# Patient Record
Sex: MALE | Race: ASIAN | NOT HISPANIC OR LATINO | Employment: FULL TIME | URBAN - METROPOLITAN AREA
[De-identification: names, ages, dates, MRNs, and addresses within clinical notes are randomized per-mention and may not be internally consistent; named-entity substitution may affect disease eponyms.]

---

## 2017-01-05 ENCOUNTER — HOSPITAL ENCOUNTER (OUTPATIENT)
Dept: CARDIOLOGY | Facility: MEDICAL CENTER | Age: 42
End: 2017-01-05
Attending: INTERNAL MEDICINE | Admitting: INTERNAL MEDICINE
Payer: COMMERCIAL

## 2017-01-05 ENCOUNTER — HOSPITAL ENCOUNTER (OUTPATIENT)
Dept: CARDIOLOGY | Facility: MEDICAL CENTER | Age: 42
End: 2017-01-05
Attending: INTERNAL MEDICINE
Payer: COMMERCIAL

## 2017-01-05 DIAGNOSIS — R00.2 PALPITATIONS: ICD-10-CM

## 2017-01-05 DIAGNOSIS — R07.89 ATYPICAL CHEST PAIN: ICD-10-CM

## 2017-01-05 LAB
LV EJECT FRACT  99904: 55
LV EJECT FRACT  99904: 60
LV EJECT FRACT MOD 2C 99903: 64.37
LV EJECT FRACT MOD 4C 99902: 57.36
LV EJECT FRACT MOD BP 99901: 59.51

## 2017-01-05 PROCEDURE — 93306 TTE W/DOPPLER COMPLETE: CPT | Mod: 26,59 | Performed by: INTERNAL MEDICINE

## 2017-01-05 PROCEDURE — 93350 STRESS TTE ONLY: CPT | Mod: 26 | Performed by: INTERNAL MEDICINE

## 2017-01-05 PROCEDURE — 93306 TTE W/DOPPLER COMPLETE: CPT

## 2017-01-05 PROCEDURE — 93017 CV STRESS TEST TRACING ONLY: CPT

## 2017-01-05 PROCEDURE — 93350 STRESS TTE ONLY: CPT

## 2017-01-05 PROCEDURE — 93018 CV STRESS TEST I&R ONLY: CPT | Performed by: INTERNAL MEDICINE

## 2017-02-19 ENCOUNTER — HOSPITAL ENCOUNTER (EMERGENCY)
Facility: MEDICAL CENTER | Age: 42
End: 2017-02-19
Payer: COMMERCIAL

## 2017-02-19 VITALS
WEIGHT: 168.65 LBS | HEIGHT: 64 IN | BODY MASS INDEX: 28.79 KG/M2 | RESPIRATION RATE: 18 BRPM | OXYGEN SATURATION: 97 % | DIASTOLIC BLOOD PRESSURE: 67 MMHG | SYSTOLIC BLOOD PRESSURE: 110 MMHG | HEART RATE: 85 BPM | TEMPERATURE: 97.9 F

## 2017-02-19 PROCEDURE — 302449 STATCHG TRIAGE ONLY (STATISTIC)

## 2017-02-20 NOTE — ED NOTES
"Chief Complaint   Patient presents with   • Cough     x 1 month         /67 mmHg  Pulse 85  Temp(Src) 36.6 °C (97.9 °F)  Resp 18  Ht 1.626 m (5' 4.02\")  Wt 76.5 kg (168 lb 10.4 oz)  BMI 28.93 kg/m2  SpO2 97%    "

## 2017-03-01 ENCOUNTER — HOSPITAL ENCOUNTER (EMERGENCY)
Facility: MEDICAL CENTER | Age: 42
End: 2017-03-01
Attending: EMERGENCY MEDICINE
Payer: COMMERCIAL

## 2017-03-01 ENCOUNTER — APPOINTMENT (OUTPATIENT)
Dept: RADIOLOGY | Facility: MEDICAL CENTER | Age: 42
End: 2017-03-01
Attending: EMERGENCY MEDICINE
Payer: COMMERCIAL

## 2017-03-01 ENCOUNTER — NON-PROVIDER VISIT (OUTPATIENT)
Dept: OCCUPATIONAL MEDICINE | Facility: CLINIC | Age: 42
End: 2017-03-01
Payer: COMMERCIAL

## 2017-03-01 VITALS
DIASTOLIC BLOOD PRESSURE: 71 MMHG | RESPIRATION RATE: 17 BRPM | OXYGEN SATURATION: 98 % | WEIGHT: 172 LBS | BODY MASS INDEX: 29.37 KG/M2 | SYSTOLIC BLOOD PRESSURE: 108 MMHG | HEART RATE: 80 BPM | HEIGHT: 64 IN | TEMPERATURE: 98.1 F

## 2017-03-01 DIAGNOSIS — R00.2 PALPITATIONS: ICD-10-CM

## 2017-03-01 DIAGNOSIS — Z02.83 ENCOUNTER FOR DRUG SCREENING: ICD-10-CM

## 2017-03-01 DIAGNOSIS — Z02.1 PRE-EMPLOYMENT DRUG SCREENING: ICD-10-CM

## 2017-03-01 LAB
ANION GAP SERPL CALC-SCNC: 8 MMOL/L (ref 0–11.9)
BASOPHILS # BLD AUTO: 0.5 % (ref 0–1.8)
BASOPHILS # BLD: 0.04 K/UL (ref 0–0.12)
BUN SERPL-MCNC: 14 MG/DL (ref 8–22)
CALCIUM SERPL-MCNC: 9.5 MG/DL (ref 8.5–10.5)
CHLORIDE SERPL-SCNC: 102 MMOL/L (ref 96–112)
CO2 SERPL-SCNC: 25 MMOL/L (ref 20–33)
CREAT SERPL-MCNC: 0.97 MG/DL (ref 0.5–1.4)
EKG IMPRESSION: NORMAL
EOSINOPHIL # BLD AUTO: 0.13 K/UL (ref 0–0.51)
EOSINOPHIL NFR BLD: 1.5 % (ref 0–6.9)
ERYTHROCYTE [DISTWIDTH] IN BLOOD BY AUTOMATED COUNT: 39.8 FL (ref 35.9–50)
GFR SERPL CREATININE-BSD FRML MDRD: >60 ML/MIN/1.73 M 2
GLUCOSE SERPL-MCNC: 96 MG/DL (ref 65–99)
HCT VFR BLD AUTO: 45.5 % (ref 42–52)
HGB BLD-MCNC: 15.3 G/DL (ref 14–18)
IMM GRANULOCYTES # BLD AUTO: 0.07 K/UL (ref 0–0.11)
IMM GRANULOCYTES NFR BLD AUTO: 0.8 % (ref 0–0.9)
LYMPHOCYTES # BLD AUTO: 1.84 K/UL (ref 1–4.8)
LYMPHOCYTES NFR BLD: 21.3 % (ref 22–41)
MCH RBC QN AUTO: 31.2 PG (ref 27–33)
MCHC RBC AUTO-ENTMCNC: 33.6 G/DL (ref 33.7–35.3)
MCV RBC AUTO: 92.7 FL (ref 81.4–97.8)
MONOCYTES # BLD AUTO: 0.79 K/UL (ref 0–0.85)
MONOCYTES NFR BLD AUTO: 9.2 % (ref 0–13.4)
NEUTROPHILS # BLD AUTO: 5.76 K/UL (ref 1.82–7.42)
NEUTROPHILS NFR BLD: 66.7 % (ref 44–72)
NRBC # BLD AUTO: 0 K/UL
NRBC BLD AUTO-RTO: 0 /100 WBC
PLATELET # BLD AUTO: 226 K/UL (ref 164–446)
PMV BLD AUTO: 9.2 FL (ref 9–12.9)
POTASSIUM SERPL-SCNC: 3.6 MMOL/L (ref 3.6–5.5)
RBC # BLD AUTO: 4.91 M/UL (ref 4.7–6.1)
SODIUM SERPL-SCNC: 135 MMOL/L (ref 135–145)
TROPONIN I SERPL-MCNC: <0.01 NG/ML (ref 0–0.04)
TROPONIN I SERPL-MCNC: <0.01 NG/ML (ref 0–0.04)
WBC # BLD AUTO: 8.6 K/UL (ref 4.8–10.8)

## 2017-03-01 PROCEDURE — 82075 ASSAY OF BREATH ETHANOL: CPT | Performed by: INTERNAL MEDICINE

## 2017-03-01 PROCEDURE — 99283 EMERGENCY DEPT VISIT LOW MDM: CPT

## 2017-03-01 PROCEDURE — A9270 NON-COVERED ITEM OR SERVICE: HCPCS | Performed by: EMERGENCY MEDICINE

## 2017-03-01 PROCEDURE — 85025 COMPLETE CBC W/AUTO DIFF WBC: CPT

## 2017-03-01 PROCEDURE — 84484 ASSAY OF TROPONIN QUANT: CPT

## 2017-03-01 PROCEDURE — 99026 IN-HOSPITAL ON CALL SERVICE: CPT | Performed by: INTERNAL MEDICINE

## 2017-03-01 PROCEDURE — 71010 DX-CHEST-PORTABLE (1 VIEW): CPT

## 2017-03-01 PROCEDURE — 93005 ELECTROCARDIOGRAM TRACING: CPT

## 2017-03-01 PROCEDURE — 80048 BASIC METABOLIC PNL TOTAL CA: CPT

## 2017-03-01 PROCEDURE — 80305 DRUG TEST PRSMV DIR OPT OBS: CPT | Performed by: INTERNAL MEDICINE

## 2017-03-01 PROCEDURE — 700102 HCHG RX REV CODE 250 W/ 637 OVERRIDE(OP): Performed by: EMERGENCY MEDICINE

## 2017-03-01 RX ORDER — ASPIRIN 81 MG/1
324 TABLET, CHEWABLE ORAL ONCE
Status: COMPLETED | OUTPATIENT
Start: 2017-03-01 | End: 2017-03-01

## 2017-03-01 RX ADMIN — ASPIRIN 324 MG: 81 TABLET, CHEWABLE ORAL at 03:02

## 2017-03-01 ASSESSMENT — PAIN SCALES - GENERAL: PAINLEVEL_OUTOF10: 1

## 2017-03-01 NOTE — ED AVS SNAPSHOT
3/1/2017          Lucas Simmons  [address] [place] [address]    Dear Lucas:    [facility] wants to ensure your discharge home is safe and you or your loved ones have had all your questions answered regarding your care after you leave the hospital.    You may receive a telephone call within two days of your discharge.  This call is to make certain you understand your discharge instructions as well as ensure we provided you with the best care possible during your stay with us.     The call will only last approximately 3-5 minutes and will be done by a nurse.    Once again, we want to ensure your discharge home is safe and that you have a clear understanding of any next steps in your care.  If you have any questions or concerns, please do not hesitate to contact us, we are here for you.  Thank you for choosing [facility] for your healthcare needs.    Sincerely,    David Roman    [facility]

## 2017-03-01 NOTE — MR AVS SNAPSHOT
Lucas Kaylamaggie   3/1/2017 6:40 PM   Appointment   MRN: [id number]    Department:  [facility]   Dept Phone:  [phone]    Description:  Male : 1975   Provider:  [place] NON RENOWN MA           Allergies as of 3/1/2017     No Known Allergies      Vital Signs     Smoking Status                   Never Smoker            Basic Information     Date Of Birth Sex Race Ethnicity Preferred Language    1975 Male  Non- English      Health Maintenance        Date Due Completion Dates    IMM DTaP/Tdap/Td Vaccine (1 - Tdap) 1993 ---            Current Immunizations     Influenza Vaccine Quad Inj (Pf) 2016  2:01 PM      Below and/or attached are the medications your provider expects you to take. Review all of your home medications and newly ordered medications with your provider and/or pharmacist. Follow medication instructions as directed by your provider and/or pharmacist. Please keep your medication list with you and share with your provider. Update the information when medications are discontinued, doses are changed, or new medications (including over-the-counter products) are added; and carry medication information at all times in the event of emergency situations     Allergies:  No Known Allergies          Medications  Valid as of: 2017 -  9:11 AM    Generic Name Brand Name Tablet Size Instructions for use    Multiple Vitamin   Take  by mouth.        .                 Medicines prescribed today were sent to:     None      Medication refill instructions:       If your prescription bottle indicates you have medication refills left, it is not necessary to call your provider’s office. Please contact your pharmacy and they will refill your medication.    If your prescription bottle indicates you do not have any refills left, you may request refills at any time through one of the following ways: The online [facility] system (except Urgent Care), by calling your provider’s  office, or by asking your pharmacy to contact your provider’s office with a refill request. Medication refills are processed only during regular business hours and may not be available until the next business day. Your provider may request additional information or to have a follow-up visit with you prior to refilling your medication.   *Please Note: Medication refills are assigned a new Rx number when refilled electronically. Your pharmacy may indicate that no refills were authorized even though a new prescription for the same medication is available at the pharmacy. Please request the medicine by name with the pharmacy before contacting your provider for a refill.           [employer] Access Code: Activation code not generated  Current [employer] Status: Active

## 2017-03-01 NOTE — LETTER
"  FORM C-4:  EMPLOYEE’S CLAIM FOR COMPENSATION/ REPORT OF INITIAL TREATMENT  EMPLOYEE’S CLAIM - PROVIDE ALL INFORMATION REQUESTED   First Name  Lucas Last Name  Iris Birthdate             Age  1975 41 y.o. Sex  male Claim Number   Home Employee Address  [address]  [place]                                     Zip  [address] Height  1.626 m (5' 4\") Weight  78.019 kg (172 lb) [facility]  [id number]   Mailing Employee Address                           [address]   [place]               Zip  [address] Telephone  [phone] (home)  Primary Language Spoken  ENGLISH   Insurer  *** Third Party   WORKERS CHOICE Employee's Occupation (Job Title) When Injury or Occupational Disease Occurred     Employer's Name  [employer] Telephone  [phone]    Employer Address  [address]] Zip  [address]   Date of Injury  3/1/2017       Hour of Injury  1:00 AM Date Employer Notified  3/1/2017 Last Day of Work after Injury or Occupational Disease   Supervisor to Whom Injury Reported  andrew hodge   Address or Location of Accident (if applicable)  [[facility]]   What were you doing at the time of accident? (if applicable)  working doing differential & making slides    How did this injury or occupational disease occur? Be specific and answer in detail. 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  n/a  n/a Witnesses to the Accident  suvash     Nature of Injury or Occupational Disease  Strain  Part(s) of Body Injured or Affected  Chest, N/A, N/A    I certify that the above is true and correct to the best of my knowledge and that I have provided this information in order to obtain the " benefits of [place]  I hereby authorize any physician, chiropractor, surgeon, practitioner, or other person, any hospital, including [facility] or [facility] hospital, any medical service organization, any insurance company, or other institution or organization to release to each other, any medical or other information, including benefits paid or payable, pertinent to this injury or disease, except information relative to diagnosis, treatment and/or counseling for AIDS, psychological conditions, alcohol or controlled substances, for which I must give specific authorization.  A Photostat of this authorization shall be as valid as the original.   Date Place   Employee’s Signature   THIS REPORT MUST BE COMPLETED AND MAILED WITHIN 3 WORKING DAYS OF TREATMENT   Place  [facility], EMERGENCY DEPT  Name of Facility   [facility]   Date  3/1/2017 Diagnosis  (R00.2) Palpitations Is there evidence the injured employee was under the influence of alcohol and/or another controlled substance at the time of accident?   Hour  5:52 AM Description of Injury or Disease  Palpitations     Treatment     Have you advised the patient to remain off work five days or more?             X-Ray Findings      If Yes   From Date    To Date      From information given by the employee, together with medical evidence, can you directly connect this injury or occupational disease as job incurred?    If No, is the employee capable of: Full Duty    Modified Duty      Is additional medical care by a physician indicated?    If Modified Duty, Specify any Limitations / Restrictions        Do you know of any previous injury or disease contributing to this condition or occupational disease?      Date  3/1/2017 Print Doctor’s Name  Sai Ann I certify the employer’s copy of this form was mailed on:  "  Address  [address] [id number] [phone] Insurer’s Use Only   [facility]  [id number]    Provider’s Tax ID Number    Telephone  Dept: [phone]    Doctor’s Signature    Degree       Original - TREATING PHYSICIAN OR CHIROPRACTOR   Pg 2-Insurer/TPA   Pg 3-Employer   Pg 4-Employee                                                                                                  Form C-4 (rev01/03)     BRIEF DESCRIPTION OF RIGHTS AND BENEFITS  (Pursuant to [place] 616[place])    Notice of Injury or Occupational Disease (Incident Report Form C-1): If an injury or occupational disease (OD) arises out of and in the course of employment, you must provide written notice to your employer as soon as practicable, but no later than 7 days after the accident or OD. Your employer shall maintain a sufficient supply of the required forms.    Claim for Compensation (Form C-4): If medical treatment is sought, the form C-4 is available at the place of initial treatment. A completed \"Claim for Compensation\" (Form C-4) must be filed within 90 days after an accident or OD. The treating physician or chiropractor must, within 3 working days after treatment, complete and mail to the employer, the employer's insurer and third-party , the Claim for Compensation.    Medical Treatment: If you require medical treatment for your on-the-job injury or OD, you may be required to select a physician or chiropractor from a list provided by your workers’ compensation insurer, if it has contracted with an Organization for Managed Care (MCO) or Preferred Provider Organization (PPO) or providers of health care. If your employer has not entered into a contract with an MCO or PPO, you may select a physician or chiropractor from the Panel of Physicians and Chiropractors. Any medical costs related to your industrial injury or OD will be paid by your insurer.    Temporary Total Disability (TTD): If your doctor has " certified that you are unable to work for a period of at least 5 consecutive days, or 5 cumulative days in a 20-day period, or places restrictions on you that your employer does not accommodate, you may be entitled to TTD compensation.    Temporary Partial Disability (TPD): If the wage you receive upon reemployment is less than the compensation for TTD to which you are entitled, the insurer may be required to pay you TPD compensation to make up the difference. TPD can only be paid for a maximum of 24 months.    Permanent Partial Disability (PPD): When your medical condition is stable and there is an indication of a PPD as a result of your injury or OD, within 30 days, your insurer must arrange for an evaluation by a rating physician or chiropractor to determine the degree of your PPD. The amount of your PPD award depends on the date of injury, the results of the PPD evaluation and your age and wage.    Permanent Total Disability (PTD): If you are medically certified by a treating physician or chiropractor as permanently and totally disabled and have been granted a PTD status by your insurer, you are entitled to receive monthly benefits not to exceed 66 2/3% of your average monthly wage. The amount of your PTD payments is subject to reduction if you previously received a PPD award.    Vocational Rehabilitation Services: You may be eligible for vocational rehabilitation services if you are unable to return to the job due to a permanent physical impairment or permanent restrictions as a result of your injury or occupational disease.    Transportation and Per Linda Reimbursement: You may be eligible for travel expenses and per linda associated with medical treatment.  Reopening: You may be able to reopen your claim if your condition worsens after claim closure.    Appeal Process: If you disagree with a written determination issued by the insurer or the insurer does not respond to your request, you may appeal to the  Department of Administration, , by following the instructions contained in your determination letter. You must appeal the determination within 70 days from the date of the determination letter at [address], [address], [place], [place] [address], or [address], [address], [place], [place] [address]. If you disagree with the  decision, you may appeal to the Department of Administration, . You must file your appeal within 30 days from the date of the  decision letter at [address], [address], [place], [place] [address], or [address], [address], [place], [place] [address]. If you disagree with a decision of an , you may file a petition for judicial review with the District Court. You must do so within 30 days of the Appeal Officer’s decision. You may be represented by an  at your own expense or you may contact the [facility] for possible representation.    [place]  for Injured Workers (NAIW): If you disagree with a  decision, you may request that NAIW represent you without charge at an  Hearing. For information regarding denial of benefits, you may contact the [facility] at: [address], [address], [place] [address], [phone], or [address], [address], [place] [address], [phone]    To File a Complaint with the Division: If you wish to file a complaint with the  of the Division of Industrial Relations (DIR), please contact the Workers’ Compensation Section, [address], [address], [place], [place] [address], telephone [phone], or [address], [address], [place] [address], telephone [phone].    For assistance with Workers’ Compensation Issues: you may contact the Office of the Governor Consumer Health Assistance, [address], [address], [place], [place] [address], Toll  Free 1-865.440.5839, Web site: http://yahaira..nv., E-mail vijaya@yahaira..nv.                                                                                                                                                                               __________________________________________________________________                                    _________________            Employee Name / Signature                                                                                                                            Date                                       D-2 (rev. 10/07)

## 2017-03-01 NOTE — ED NOTES
"Chief Complaint   Patient presents with   • Rapid Heart Beat     moved here from [place] and had rapid heart rate. \"feels like that again\"        "

## 2017-03-01 NOTE — LETTER
"  FORM C-4:  EMPLOYEE’S CLAIM FOR COMPENSATION/ REPORT OF INITIAL TREATMENT  EMPLOYEE’S CLAIM - PROVIDE ALL INFORMATION REQUESTED   First Name  Lucas Last Name  Iris Birthdate             Age  1975 41 y.o. 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 (if applicable)  working doing differential & making slides    How did this injury or occupational disease occur? Be specific and answer in detail. 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  n/a  n/a Witnesses to the Accident  suvash     Nature of Injury or Occupational Disease  Strain  Part(s) of Body Injured or Affected  Chest, N/A, N/A    I certify that the above is true and correct to the best of my knowledge and that I have provided this information in " order to obtain the benefits of [place]  I hereby authorize any physician, chiropractor, surgeon, practitioner, or other person, any hospital, including [facility] or [facility] hospital, any medical service organization, any insurance company, or other institution or organization to release to each other, any medical or other information, including benefits paid or payable, pertinent to this injury or disease, except information relative to diagnosis, treatment and/or counseling for AIDS, psychological conditions, alcohol or controlled substances, for which I must give specific authorization.  A Photostat of this authorization shall be as valid as the original.   Date 03/01/2016 [facility] Employee’s Signature   THIS REPORT MUST BE COMPLETED AND MAILED WITHIN 3 WORKING DAYS OF TREATMENT   Place  [facility], EMERGENCY DEPT  Name of Facility   [facility]   Date  3/1/2017 Diagnosis  (R00.2) Palpitations Is there evidence the injured employee was under the influence of alcohol and/or another controlled substance at the time of accident?   Hour  6:00 AM Description of Injury or Disease  Palpitations No   Treatment  Follow up with cardiology  Have you advised the patient to remain off work five days or more?         No   X-Ray Findings      If Yes   From Date    To Date      From information given by the employee, together with medical evidence, can you directly connect this injury or occupational disease as job incurred?  No If No, is the employee capable of: Full Duty  Yes Modified Duty      Is additional medical care by a physician indicated?  Yes If Modified Duty, Specify any Limitations / Restrictions        Do you know of any previous injury or disease contributing to this condition or occupational disease?  Yes   Date  3/1/2017 Print Doctor’s  "Name  Sai Ann certify the employer’s copy of this form was mailed on:  03/01/2017   Address  [address] [place] [address] [phone] Insurer’s Use Only   [facility]  [address]    Provider’s Tax ID Number   [id number] Telephone  Dept: [phone]    Doctor’s Signature  e-SAI Navarrete M.D. Degree   MD    Original - TREATING PHYSICIAN OR CHIROPRACTOR   Pg 2-Insurer/TPA   Pg 3-Employer   Pg 4-Employee                                                                                                  Form C-4 (rev01/03)     BRIEF DESCRIPTION OF RIGHTS AND BENEFITS  (Pursuant to [place] 616[place])    Notice of Injury or Occupational Disease (Incident Report Form C-1): If an injury or occupational disease (OD) arises out of and in the course of employment, you must provide written notice to your employer as soon as practicable, but no later than 7 days after the accident or OD. Your employer shall maintain a sufficient supply of the required forms.    Claim for Compensation (Form C-4): If medical treatment is sought, the form C-4 is available at the place of initial treatment. A completed \"Claim for Compensation\" (Form C-4) must be filed within 90 days after an accident or OD. The treating physician or chiropractor must, within 3 working days after treatment, complete and mail to the employer, the employer's insurer and third-party , the Claim for Compensation.    Medical Treatment: If you require medical treatment for your on-the-job injury or OD, you may be required to select a physician or chiropractor from a list provided by your workers’ compensation insurer, if it has contracted with an Organization for Managed Care (MCO) or Preferred Provider Organization (PPO) or providers of health care. If your employer has not entered into a contract with an MCO or PPO, you may select a physician or chiropractor from the Panel of Physicians and Chiropractors. Any medical " costs related to your industrial injury or OD will be paid by your insurer.    Temporary Total Disability (TTD): If your doctor has certified that you are unable to work for a period of at least 5 consecutive days, or 5 cumulative days in a 20-day period, or places restrictions on you that your employer does not accommodate, you may be entitled to TTD compensation.    Temporary Partial Disability (TPD): If the wage you receive upon reemployment is less than the compensation for TTD to which you are entitled, the insurer may be required to pay you TPD compensation to make up the difference. TPD can only be paid for a maximum of 24 months.    Permanent Partial Disability (PPD): When your medical condition is stable and there is an indication of a PPD as a result of your injury or OD, within 30 days, your insurer must arrange for an evaluation by a rating physician or chiropractor to determine the degree of your PPD. The amount of your PPD award depends on the date of injury, the results of the PPD evaluation and your age and wage.    Permanent Total Disability (PTD): If you are medically certified by a treating physician or chiropractor as permanently and totally disabled and have been granted a PTD status by your insurer, you are entitled to receive monthly benefits not to exceed 66 2/3% of your average monthly wage. The amount of your PTD payments is subject to reduction if you previously received a PPD award.    Vocational Rehabilitation Services: You may be eligible for vocational rehabilitation services if you are unable to return to the job due to a permanent physical impairment or permanent restrictions as a result of your injury or occupational disease.    Transportation and Per Linda Reimbursement: You may be eligible for travel expenses and per linda associated with medical treatment.  Reopening: You may be able to reopen your claim if your condition worsens after claim closure.    Appeal Process: If you  disagree with a written determination issued by the insurer or the insurer does not respond to your request, you may appeal to the Department of Administration, , by following the instructions contained in your determination letter. You must appeal the determination within 70 days from the date of the determination letter at [address], [address], [place], [place] [address], or [address], [address], [place], [place] [address]. If you disagree with the  decision, you may appeal to the Department of Administration, . You must file your appeal within 30 days from the date of the  decision letter at [address], [address], [place], [place] [address], or [address], [address], [place], [place] [address]. If you disagree with a decision of an , you may file a petition for judicial review with the District Court. You must do so within 30 days of the Appeal Officer’s decision. You may be represented by an  at your own expense or you may contact the [facility] for possible representation.    [place]  for Injured Workers (NAIW): If you disagree with a  decision, you may request that NAIW represent you without charge at an  Hearing. For information regarding denial of benefits, you may contact the [facility] at: [address], [address], [place] [address], [phone], or [address], [address], [place] [address], [phone]    To File a Complaint with the Division: If you wish to file a complaint with the  of the Division of Industrial Relations (DIR), please contact the Workers’ Compensation Section, [address], [address], [place], [place] [address], telephone [phone]) [phone], or [address], [place], [place] [address], telephone [phone].    For assistance with Workers’ Compensation Issues: you may  contact the Office of the Governor Consumer Health Assistance, [address] [place] [address], [address], [place], [place] [address], Toll Free 1-802.824.1115, Web site: http://govcha.[facility].nv., E-mail [email or web address].                                                                                                                                                                               __________________________________________________________________                                    ______03/01/2017___________            Employee Name / Signature                                                                                                                            Date                                       D-2 (rev. 10/07)

## 2017-03-01 NOTE — ED PROVIDER NOTES
"ED Provider Note    Scribed for Sai Ann M.D. by Melvin Daigle. 3/1/2017, 2:42 AM.    Primary care provider: Pcp Pt States None  Means of arrival: Walk-in  History obtained from: Patient  History limited by: None    CHIEF COMPLAINT  Chief Complaint   Patient presents with   • Rapid Heart Beat     moved here from [place] and had rapid heart rate. \"feels like that again\"        HPI  Lucas Simmons is a 41 y.o. male who presents to the Emergency Department complaining of intermittent palpitations onset today. Patient reports that the symptoms are similar to a previous event for which he saw a cardiologist in December of 2016. He reports associated dizziness. The patient denies any associated shortness of breath states minimal chest pain at the time of the event not currently expressing any chest pain. Also, he reports no pertinent medical history other than the previous episode of palpitations.    REVIEW OF SYSTEMS  Pertinent positives include hear palpitations. Pertinent negatives include no shortness of breath. Otherwise ten systems reviewed and otherwise negative. C    PAST MEDICAL HISTORY  No pertinent past medical history noted    SURGICAL HISTORY  patient denies any surgical history    SOCIAL HISTORY  Social History   Substance Use Topics   • Smoking status: Never Smoker    • Smokeless tobacco: Never Used   • Alcohol Use: Yes      History   Drug Use No       FAMILY HISTORY  Non-Contributory    CURRENT MEDICATIONS  Home Medications     Reviewed by Cecy Jurado R.N. (Registered Nurse) on 03/01/17 at 0219  Med List Status: Complete    Medication Last Dose Status    Multiple Vitamin (MULTI VITAMIN DAILY PO)  Active                ALLERGIES  No Known Allergies    PHYSICAL EXAM  VITAL SIGNS: /71 mmHg  Pulse 90  Temp(Src) 36.8 °C (98.2 °F) (Temporal)  Resp 18  Ht 1.626 m (5' 4\")  Wt 78.019 kg (172 lb)  BMI 29.51 kg/m2  SpO2 98%  Pulse ox interpretation: I interpret this pulse ox as " normal.  Constitutional: Alert and oriented x 3, No acute distress  HEENT: Atraumatic normocephalic, pupils are equal round reactive to light extraocular movements are intact. The nares is clear, external ears are normal, mouth shows moist mucous membranes  Neck: Supple, no JVD no tracheal deviation  Cardiovascular: Regular rate and rhythm no murmur rub or gallop 2+ pulses peripherally x4  Thorax & Lungs: No respiratory distress, no wheezes rales or rhonchi, No chest tenderness.   GI: Soft nontender nondistended positive bowel sounds, no peritoneal signs  Skin: Warm dry no acute rash or lesion  Musculoskeletal: Moving all extremities with full range and 5 of 5 strength, no acute  deformity  Neurologic: Cranial nerves III through XII are grossly intact, no sensory deficit, no cerebellar dysfunction   Psychiatric: Appropriate affect for situation at this time    DIAGNOSTIC STUDIES / PROCEDURES  LABS  Results for orders placed or performed during the hospital encounter of 03/01/17   CBC WITH DIFFERENTIAL   Result Value Ref Range    WBC 8.6 4.8 - 10.8 K/uL    RBC 4.91 4.70 - 6.10 M/uL    Hemoglobin 15.3 14.0 - 18.0 g/dL    Hematocrit 45.5 42.0 - 52.0 %    MCV 92.7 81.4 - 97.8 fL    MCH 31.2 27.0 - 33.0 pg    MCHC 33.6 (L) 33.7 - 35.3 g/dL    RDW 39.8 35.9 - 50.0 fL    Platelet Count 226 164 - 446 K/uL    MPV 9.2 9.0 - 12.9 fL    Neutrophils-Polys 66.70 44.00 - 72.00 %    Lymphocytes 21.30 (L) 22.00 - 41.00 %    Monocytes 9.20 0.00 - 13.40 %    Eosinophils 1.50 0.00 - 6.90 %    Basophils 0.50 0.00 - 1.80 %    Immature Granulocytes 0.80 0.00 - 0.90 %    Nucleated RBC 0.00 /100 WBC    Neutrophils (Absolute) 5.76 1.82 - 7.42 K/uL    Lymphs (Absolute) 1.84 1.00 - 4.80 K/uL    Monos (Absolute) 0.79 0.00 - 0.85 K/uL    Eos (Absolute) 0.13 0.00 - 0.51 K/uL    Baso (Absolute) 0.04 0.00 - 0.12 K/uL    Immature Granulocytes (abs) 0.07 0.00 - 0.11 K/uL    NRBC (Absolute) 0.00 K/uL   BASIC METABOLIC PANEL   Result Value Ref Range     Sodium 135 135 - 145 mmol/L    Potassium 3.6 3.6 - 5.5 mmol/L    Chloride 102 96 - 112 mmol/L    Co2 25 20 - 33 mmol/L    Glucose 96 65 - 99 mg/dL    Bun 14 8 - 22 mg/dL    Creatinine 0.97 0.50 - 1.40 mg/dL    Calcium 9.5 8.5 - 10.5 mg/dL    Anion Gap 8.0 0.0 - 11.9   TROPONIN   Result Value Ref Range    Troponin I <0.01 0.00 - 0.04 ng/mL   ESTIMATED GFR   Result Value Ref Range    GFR If African American >60 >60 mL/min/1.73 m 2    GFR If Non African American >60 >60 mL/min/1.73 m 2   TROPONIN   Result Value Ref Range    Troponin I <0.01 0.00 - 0.04 ng/mL   EKG (ER)   Result Value Ref Range    Report       [facility] Emergency Dept.    Test Date:  2017  Pt Name:    HERACLIO LAMAS               Department: ER  MRN:        [id number]                      Room:  Gender:                                 Technician: [id number]  :        1975                   Requested By:ER TRIAGE PROTOCOL  Order #:    [id number]                    Reading MD:    Measurements  Intervals                                Axis  Rate:       87                           P:          56  MN:         156                          QRS:        14  QRSD:       86                           T:          12  QT:         348  QTc:        419    Interpretive Statements  SINUS RHYTHM  Compared to ECG 2016 21:01:34  No significant changes         All labs reviewed by me.    EKG Interpretation  Interpreted by me    Rhythm:  Normal sinus rhythm   Rate: 87  Axis: normal  Ectopy: none  Conduction: normal  ST Segments: no acute change  T Waves: no acute change  Q Waves: none  Clinical Impression: Normal EKG    COURSE & MEDICAL DECISION MAKING  Pertinent Labs & Imaging studies reviewed. (See chart for details)    2:42 AM - Patient seen and examined at bedside. Patient will be treated with Aspirin tablet 324 mg. Ordered CBC with differential, BMP, Troponin, and EKG to evaluate his symptoms. I informed the patient that we will be  performing an EKG and basic blood work. The patient understands and is agreeable.    Medical Decision Making: Previous records indicate normal stress echo one month prior he also wore a Holter monitor for palpitations. He's had no further palpitations or no further chest pain serial troponins and EKGs unremarkable. Patient discharge follow-up with his cardiologist the next available time return for any further palpitations chest pain shortness breath any other acute concerns otherwise discharged home in stable condition.  Korina Dacosta M.D.  [address]   [address]  [facility] [address]  [phone]    Schedule an appointment as soon as possible for a visit      [facility], Emergency Dept  [address] [address] [phone]    If symptoms worsen      FINAL IMPRESSION  1. Palpitations           This dictation has been created using voice recognition software and/or scribes. The accuracy of the dictation is limited by the abilities of the software and the expertise of the scribes. I expect there may be some errors of grammar and possibly content. I made every attempt to manually correct the errors within my dictation. However, errors related to voice recognition software and/or scribes may still exist and should be interpreted within the appropriate context.     I, Melvin Daigle (Scribe), am scribing for, and in the presence of, Sai Ann M.D..    Electronically signed by: Melvin Daigle (Scribe), 3/1/2017    ISai M.D. personally performed the services described in this documentation, as scribed by Melvin Daigle in my presence, and it is both accurate and complete.    The note accurately reflects work and decisions made by me.  Sai Ann  3/1/2017  7:49 AM

## 2017-03-01 NOTE — ED AVS SNAPSHOT
Spectrum Mobile Access Code: Activation code not generated  Current Spectrum Mobile Status: Active    [employer]  A secure, online tool to manage your health information     [employer]’s Spectrum Mobile® is a secure, online tool that connects you to your personalized health information from the privacy of your home -- day or night - making it very easy for you to manage your healthcare. Once the activation process is completed, you can even access your medical information using the Spectrum Mobile haja, which is available for free in the Apple Haja store or Google Play store.     Spectrum Mobile provides the following levels of access (as shown below):   My Chart Features   [facility] Primary Care Doctor [facility]  Specialists [facility]  Urgent  Care Non-[facility]  Primary Care  Doctor   Email your healthcare team securely and privately 24/7 X X X X   Manage appointments: schedule your next appointment; view details of past/upcoming appointments X      Request prescription refills. X      View recent personal medical records, including lab and immunizations X X X X   View health record, including health history, allergies, medications X X X X   Read reports about your outpatient visits, procedures, consult and ER notes X X X X   See your discharge summary, which is a recap of your hospital and/or ER visit that includes your diagnosis, lab results, and care plan. X X       How to register for Spectrum Mobile:  1. Go to  [email or web address].  2. Click on the Sign Up Now box, which takes you to the New Member Sign Up page. You will need to provide the following information:  a. Enter your Spectrum Mobile Access Code exactly as it appears at the top of this page. (You will not need to use this code after you’ve completed the sign-up process. If you do not sign up before the expiration date, you must request a new code.)   b. Enter your date of birth.   c. Enter your home email address.   d. Click Submit, and follow the next screen’s instructions.  3. Create a Spectrum Mobile ID. This will  be your [employer] login ID and cannot be changed, so think of one that is secure and easy to remember.  4. Create a [employer] password. You can change your password at any time.  5. Enter your Password Reset Question and Answer. This can be used at a later time if you forget your password.   6. Enter your e-mail address. This allows you to receive e-mail notifications when new information is available in [employer].  7. Click Sign Up. You can now view your health information.    For assistance activating your [employer] account, call [phone]

## 2017-03-01 NOTE — DISCHARGE INSTRUCTIONS
Palpitations  A palpitation is the feeling that your heartbeat is irregular or is faster than normal. It may feel like your heart is fluttering or skipping a beat. Palpitations are usually not a serious problem. However, in some cases, you may need further medical evaluation.  CAUSES   Palpitations can be caused by:  · Smoking.  · Caffeine or other stimulants, such as diet pills or energy drinks.  · Alcohol.  · Stress and anxiety.  · Strenuous physical activity.  · Fatigue.  · Certain medicines.  · Heart disease, especially if you have a history of irregular heart rhythms (arrhythmias), such as atrial fibrillation, atrial flutter, or supraventricular tachycardia.  · An improperly working pacemaker or defibrillator.  DIAGNOSIS   To find the cause of your palpitations, your health care provider will take your medical history and perform a physical exam. Your health care provider may also have you take a test called an ambulatory electrocardiogram (ECG). An ECG records your heartbeat patterns over a 24-hour period. You may also have other tests, such as:  · Transthoracic echocardiogram (TTE). During echocardiography, sound waves are used to evaluate how blood flows through your heart.  · Transesophageal echocardiogram (JENNY).  · Cardiac monitoring. This allows your health care provider to monitor your heart rate and rhythm in real time.  · Holter monitor. This is a portable device that records your heartbeat and can help diagnose heart arrhythmias. It allows your health care provider to track your heart activity for several days, if needed.  · Stress tests by exercise or by giving medicine that makes the heart beat faster.  TREATMENT   Treatment of palpitations depends on the cause of your symptoms and can vary greatly. Most cases of palpitations do not require any treatment other than time, relaxation, and monitoring your symptoms. Other causes, such as atrial fibrillation, atrial flutter, or supraventricular  tachycardia, usually require further treatment.  HOME CARE INSTRUCTIONS   · Avoid:  ¨ Caffeinated coffee, tea, soft drinks, diet pills, and energy drinks.  ¨ Chocolate.  ¨ Alcohol.  · Stop smoking if you smoke.  · Reduce your stress and anxiety. Things that can help you relax include:  ¨ A method of controlling things in your body, such as your heartbeats, with your mind (biofeedback).  ¨ Yoga.  ¨ Meditation.  ¨ Physical activity such as swimming, jogging, or walking.  · Get plenty of rest and sleep.  SEEK MEDICAL CARE IF:   · You continue to have a fast or irregular heartbeat beyond 24 hours.  · Your palpitations occur more often.  SEEK IMMEDIATE MEDICAL CARE IF:  · You have chest pain or shortness of breath.  · You have a severe headache.  · You feel dizzy or you faint.  MAKE SURE YOU:  · Understand these instructions.  · Will watch your condition.  · Will get help right away if you are not doing well or get worse.     This information is not intended to replace advice given to you by your health care provider. Make sure you discuss any questions you have with your health care provider.     Document Released: 12/15/2001 Document Revised: 12/23/2014 Document Reviewed: 02/15/2013  Elsevier Interactive Patient Education ©2016 [employer]

## 2017-03-01 NOTE — ED AVS SNAPSHOT
Home Care Instructions                                                                                                                Lucas Simmons   MRN: [id number]    Department:  [facility], Emergency Dept   Date of Visit:  3/1/2017            [facility] Emergency Dept    [address] [address]    Phone:  [phone]      You were seen by     Sai Ann M.D.      Your Diagnosis Was     Palpitations     R00.2       These are the medications you received during your hospitalization from 03/01/2017 0135 to 03/01/2017 0608     Date/Time Order Dose Route Action    03/01/2017 0302 aspirin (ASA) chewable tab 324 mg 324 mg Oral Given      Follow-up Information     1. Schedule an appointment as soon as possible for a visit with Korina Dacosta M.D..    Specialty:  Cardiology    Contact information    [address]  [address]  [facility] [address] [phone]          2. Follow up with [facility], Emergency Dept.    Specialty:  Emergency Medicine    Why:  If symptoms worsen    Contact information    [address] [address] [phone]      Medication Information     Review all of your home medications and newly ordered medications with your primary doctor and/or pharmacist as soon as possible. Follow medication instructions as directed by your doctor and/or pharmacist.     Please keep your complete medication list with you and share with your physician. Update the information when medications are discontinued, doses are changed, or new medications (including over-the-counter products) are added; and carry medication information at all times in the event of emergency situations.               Medication List      ASK your doctor about these medications        Instructions    MULTI VITAMIN DAILY PO    Take  by mouth.               Procedures and tests performed during your visit     Procedure/Test Number of Times  Performed    BASIC METABOLIC PANEL 1    CBC WITH DIFFERENTIAL 1    DX-CHEST-PORTABLE (1 VIEW) 1    EKG (ER) 1    ESTIMATED GFR 1    TROPONIN 2        Discharge Instructions       Palpitations  A palpitation is the feeling that your heartbeat is irregular or is faster than normal. It may feel like your heart is fluttering or skipping a beat. Palpitations are usually not a serious problem. However, in some cases, you may need further medical evaluation.  CAUSES   Palpitations can be caused by:  · Smoking.  · Caffeine or other stimulants, such as diet pills or energy drinks.  · Alcohol.  · Stress and anxiety.  · Strenuous physical activity.  · Fatigue.  · Certain medicines.  · Heart disease, especially if you have a history of irregular heart rhythms (arrhythmias), such as atrial fibrillation, atrial flutter, or supraventricular tachycardia.  · An improperly working pacemaker or defibrillator.  DIAGNOSIS   To find the cause of your palpitations, your health care provider will take your medical history and perform a physical exam. Your health care provider may also have you take a test called an ambulatory electrocardiogram (ECG). An ECG records your heartbeat patterns over a 24-hour period. You may also have other tests, such as:  · Transthoracic echocardiogram (TTE). During echocardiography, sound waves are used to evaluate how blood flows through your heart.  · Transesophageal echocardiogram (JENNY).  · Cardiac monitoring. This allows your health care provider to monitor your heart rate and rhythm in real time.  · Holter monitor. This is a portable device that records your heartbeat and can help diagnose heart arrhythmias. It allows your health care provider to track your heart activity for several days, if needed.  · Stress tests by exercise or by giving medicine that makes the heart beat faster.  TREATMENT   Treatment of palpitations depends on the cause of your symptoms and can vary greatly. Most cases of  palpitations do not require any treatment other than time, relaxation, and monitoring your symptoms. Other causes, such as atrial fibrillation, atrial flutter, or supraventricular tachycardia, usually require further treatment.  HOME CARE INSTRUCTIONS   · Avoid:  ¨ Caffeinated coffee, tea, soft drinks, diet pills, and energy drinks.  ¨ Chocolate.  ¨ Alcohol.  · Stop smoking if you smoke.  · Reduce your stress and anxiety. Things that can help you relax include:  ¨ A method of controlling things in your body, such as your heartbeats, with your mind (biofeedback).  ¨ Yoga.  ¨ Meditation.  ¨ Physical activity such as swimming, jogging, or walking.  · Get plenty of rest and sleep.  SEEK MEDICAL CARE IF:   · You continue to have a fast or irregular heartbeat beyond 24 hours.  · Your palpitations occur more often.  SEEK IMMEDIATE MEDICAL CARE IF:  · You have chest pain or shortness of breath.  · You have a severe headache.  · You feel dizzy or you faint.  MAKE SURE YOU:  · Understand these instructions.  · Will watch your condition.  · Will get help right away if you are not doing well or get worse.     This information is not intended to replace advice given to you by your health care provider. Make sure you discuss any questions you have with your health care provider.     Document Released: 12/15/2001 Document Revised: 12/23/2014 Document Reviewed: 02/15/2013  Elsevier Interactive Patient Education ©2016 [employer]            Patient Information     Patient Information    Following emergency treatment: all patient requiring follow-up care must return either to a private physician or a clinic if your condition worsens before you are able to obtain further medical attention, please return to the emergency room.     Billing Information    At [facility], we work to make the billing process streamlined for our patients.  Our Representatives are here to answer any questions you may have regarding your hospital bill.   If you have insurance coverage and have supplied your insurance information to us, we will submit a claim to your insurer on your behalf.  Should you have any questions regarding your bill, we can be reached online or by phone as follows:  Online: You are able pay your bills online or live chat with our representatives about any billing questions you may have. We are here to help Monday - Friday from 8:00am to 7:30pm and 9:00am - 12:00pm on Saturdays.  Please visit [email or web address]  for more information.   Phone:  [phone] or [phone]    Please note that your emergency physician, surgeon, pathologist, radiologist, anesthesiologist, and other specialists are not employed by [facility] and will therefore bill separately for their services.  Please contact them directly for any questions concerning their bills at the numbers below:     Emergency Physician Services:  [phone]  [facility]:  [phone]  [facility]:  [phone]  [facility]:  [phone]    1. Your final bill may vary from the amount quoted upon discharge if all procedures are not complete at that time, or if your doctor has additional procedures of which we are not aware. You will receive an additional bill if you return to the Emergency Department at [facility] for suture removal regardless of the facility of which the sutures were placed.     2. Please arrange for settlement of this account at the emergency registration.    3. All self-pay accounts are due in full at the time of treatment.  If you are unable to meet this obligation then payment is expected within 4-5 days.     4. If you have had radiology studies (CT, X-ray, Ultrasound, MRI), you have received a preliminary result during your emergency department visit. Please contact the radiology department [phone] to receive a copy of your final result. Please discuss the Final result with  your primary physician or with the follow up physician provided.     Crisis Hotline:  [place] Crisis Hotline:  5-159-VAITKRD or 1-672.828.1721  [place] Crisis Hotline:    1-196.770.6715 or 314-740-8070         ED Discharge Follow Up Questions    1. In order to provide you with very good care, we would like to follow up with a phone call in the next few days.  May we have your permission to contact you?     YES /  NO    2. What is the best phone number to call you? (       )_____-__________    3. What is the best time to call you?      Morning  /  Afternoon  /  Evening                   Patient Signature:  ____________________________________________________________    Date:  ____________________________________________________________

## 2017-03-01 NOTE — ED NOTES
All DC discussed for palpitation. Instructed to return to ED for chest pain. SOB and dizziness.. Pt verbalized understanding of all DC instructions, prescriptions and follow up recommendations. Pt ambulated to lobby with upright and steady gait.

## 2017-03-03 LAB
AMP AMPHETAMINE: NORMAL
BAR BARBITURATES: NORMAL
BREATH ALCOHOL COMMENT: NORMAL
BZO BENZODIAZEPINES: NORMAL
COC COCAINE: NORMAL
INT CON NEG: NORMAL
INT CON POS: NORMAL
MDMA ECSTASY: NORMAL
MET METHAMPHETAMINES: NORMAL
MTD METHADONE: NORMAL
OPI OPIATES: NORMAL
OXY OXYCODONE: NORMAL
PCP PHENCYCLIDINE: NORMAL
POC BREATHALIZER: 0 PERCENT (ref 0–0.01)
POC URINE DRUG SCREEN OCDRS: NEGATIVE
THC: NORMAL

## 2017-03-03 NOTE — PROGRESS NOTES
Katelynn MURRIETA was called out after business hours to [facility] for a post-accident UDS and BAT on 3/1/17 for [facility].    UDS collected at 2:32 am.  BAT collected at 2:20 am.

## 2017-03-08 ENCOUNTER — TELEPHONE (OUTPATIENT)
Dept: CARDIOLOGY | Facility: MEDICAL CENTER | Age: 42
End: 2017-03-08

## 2017-03-08 NOTE — TELEPHONE ENCOUNTER
Patient called this morning with palpitations for 5-10minute then resolved with rest. Patient not sure what his pulse or heart rate at the time. 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 No associated symptoms. Patient will follow up as scheduled with Dr. Dacosta on Friday 3/10/17. If symptoms get worse, I encouraged the patient to report the ER.

## 2017-03-08 NOTE — TELEPHONE ENCOUNTER
----- Message from Enrrique Diaz sent at 3/8/2017  9:11 AM PST -----  Regarding: Pt experiencing palpations wants a call back  NADEEM/Neida,    Patient states for the past 2 days he's been experiencing palpations that he states last for about 10-20 minutes. He wants to know if can take Asprin or tylenol or can he be seen sooner than 3/10? Pt would please like a call back and can be reached at [phone].

## 2017-03-08 NOTE — TELEPHONE ENCOUNTER
"Spoke with patient who also has spoken with Jordin CURTIS this AM as well.  She states he gets palpitations primarily when he is fatigued.  He was awakened x 3 last night with palpitations.  He is not sure if his heart is racing.  Once he said it did then he said it did not.  He does not do physically hard work.  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  His gums are swollen and he is wanting to know if tylenol or ASA.  He will try Tylenol and follow up with his dentist.  He will maintain appointment with Dr. Dacosta on Friday and as advised by Jordin to use ER if feels immediate need for evaluation.  "

## 2017-03-09 ENCOUNTER — NON-PROVIDER VISIT (OUTPATIENT)
Dept: OCCUPATIONAL MEDICINE | Facility: CLINIC | Age: 42
End: 2017-03-09
Payer: COMMERCIAL

## 2017-03-09 ENCOUNTER — HOSPITAL ENCOUNTER (EMERGENCY)
Facility: MEDICAL CENTER | Age: 42
End: 2017-03-09
Attending: EMERGENCY MEDICINE
Payer: COMMERCIAL

## 2017-03-09 ENCOUNTER — APPOINTMENT (OUTPATIENT)
Dept: RADIOLOGY | Facility: MEDICAL CENTER | Age: 42
End: 2017-03-09
Attending: EMERGENCY MEDICINE
Payer: COMMERCIAL

## 2017-03-09 VITALS
DIASTOLIC BLOOD PRESSURE: 79 MMHG | TEMPERATURE: 98.1 F | RESPIRATION RATE: 16 BRPM | HEART RATE: 81 BPM | OXYGEN SATURATION: 95 % | BODY MASS INDEX: 28.64 KG/M2 | HEIGHT: 64 IN | WEIGHT: 167.77 LBS | SYSTOLIC BLOOD PRESSURE: 101 MMHG

## 2017-03-09 DIAGNOSIS — Z02.1 PRE-EMPLOYMENT DRUG SCREENING: ICD-10-CM

## 2017-03-09 DIAGNOSIS — Z02.83 ENCOUNTER FOR DRUG SCREENING: ICD-10-CM

## 2017-03-09 DIAGNOSIS — R00.2 PALPITATIONS: ICD-10-CM

## 2017-03-09 DIAGNOSIS — E87.6 HYPOKALEMIA: ICD-10-CM

## 2017-03-09 LAB
ALBUMIN SERPL BCP-MCNC: 4.4 G/DL (ref 3.2–4.9)
ALBUMIN/GLOB SERPL: 1.4 G/DL
ALP SERPL-CCNC: 86 U/L (ref 30–99)
ALT SERPL-CCNC: 58 U/L (ref 2–50)
ANION GAP SERPL CALC-SCNC: 8 MMOL/L (ref 0–11.9)
AST SERPL-CCNC: 32 U/L (ref 12–45)
BASOPHILS # BLD AUTO: 0.4 % (ref 0–1.8)
BASOPHILS # BLD: 0.04 K/UL (ref 0–0.12)
BILIRUB SERPL-MCNC: 0.6 MG/DL (ref 0.1–1.5)
BUN SERPL-MCNC: 17 MG/DL (ref 8–22)
CALCIUM SERPL-MCNC: 9.2 MG/DL (ref 8.4–10.2)
CHLORIDE SERPL-SCNC: 102 MMOL/L (ref 96–112)
CO2 SERPL-SCNC: 25 MMOL/L (ref 20–33)
CREAT SERPL-MCNC: 1.25 MG/DL (ref 0.5–1.4)
EKG IMPRESSION: NORMAL
EOSINOPHIL # BLD AUTO: 0.17 K/UL (ref 0–0.51)
EOSINOPHIL NFR BLD: 1.7 % (ref 0–6.9)
ERYTHROCYTE [DISTWIDTH] IN BLOOD BY AUTOMATED COUNT: 39.4 FL (ref 35.9–50)
GFR SERPL CREATININE-BSD FRML MDRD: >60 ML/MIN/1.73 M 2
GLOBULIN SER CALC-MCNC: 3.2 G/DL (ref 1.9–3.5)
GLUCOSE SERPL-MCNC: 89 MG/DL (ref 65–99)
HCT VFR BLD AUTO: 45 % (ref 42–52)
HGB BLD-MCNC: 15.6 G/DL (ref 14–18)
IMM GRANULOCYTES # BLD AUTO: 0.06 K/UL (ref 0–0.11)
IMM GRANULOCYTES NFR BLD AUTO: 0.6 % (ref 0–0.9)
LYMPHOCYTES # BLD AUTO: 3.56 K/UL (ref 1–4.8)
LYMPHOCYTES NFR BLD: 35.8 % (ref 22–41)
MCH RBC QN AUTO: 32 PG (ref 27–33)
MCHC RBC AUTO-ENTMCNC: 34.7 G/DL (ref 33.7–35.3)
MCV RBC AUTO: 92.2 FL (ref 81.4–97.8)
MONOCYTES # BLD AUTO: 0.82 K/UL (ref 0–0.85)
MONOCYTES NFR BLD AUTO: 8.2 % (ref 0–13.4)
NEUTROPHILS # BLD AUTO: 5.3 K/UL (ref 1.82–7.42)
NEUTROPHILS NFR BLD: 53.3 % (ref 44–72)
NRBC # BLD AUTO: 0 K/UL
NRBC BLD AUTO-RTO: 0 /100 WBC
PLATELET # BLD AUTO: 266 K/UL (ref 164–446)
PMV BLD AUTO: 9.2 FL (ref 9–12.9)
POTASSIUM SERPL-SCNC: 3.3 MMOL/L (ref 3.6–5.5)
PROT SERPL-MCNC: 7.6 G/DL (ref 6–8.2)
RBC # BLD AUTO: 4.88 M/UL (ref 4.7–6.1)
SODIUM SERPL-SCNC: 135 MMOL/L (ref 135–145)
TROPONIN I SERPL-MCNC: <0.02 NG/ML (ref 0–0.04)
WBC # BLD AUTO: 10 K/UL (ref 4.8–10.8)

## 2017-03-09 PROCEDURE — 82075 ASSAY OF BREATH ETHANOL: CPT | Performed by: INTERNAL MEDICINE

## 2017-03-09 PROCEDURE — 99284 EMERGENCY DEPT VISIT MOD MDM: CPT

## 2017-03-09 PROCEDURE — 80053 COMPREHEN METABOLIC PANEL: CPT

## 2017-03-09 PROCEDURE — 99026 IN-HOSPITAL ON CALL SERVICE: CPT | Performed by: INTERNAL MEDICINE

## 2017-03-09 PROCEDURE — 71010 DX-CHEST-PORTABLE (1 VIEW): CPT

## 2017-03-09 PROCEDURE — 80305 DRUG TEST PRSMV DIR OPT OBS: CPT | Performed by: INTERNAL MEDICINE

## 2017-03-09 PROCEDURE — 700102 HCHG RX REV CODE 250 W/ 637 OVERRIDE(OP): Performed by: EMERGENCY MEDICINE

## 2017-03-09 PROCEDURE — 93005 ELECTROCARDIOGRAM TRACING: CPT

## 2017-03-09 PROCEDURE — A9270 NON-COVERED ITEM OR SERVICE: HCPCS | Performed by: EMERGENCY MEDICINE

## 2017-03-09 PROCEDURE — 94760 N-INVAS EAR/PLS OXIMETRY 1: CPT

## 2017-03-09 PROCEDURE — 85025 COMPLETE CBC W/AUTO DIFF WBC: CPT

## 2017-03-09 PROCEDURE — 84484 ASSAY OF TROPONIN QUANT: CPT

## 2017-03-09 RX ORDER — POTASSIUM CHLORIDE 20 MEQ/1
40 TABLET, EXTENDED RELEASE ORAL ONCE
Status: COMPLETED | OUTPATIENT
Start: 2017-03-09 | End: 2017-03-09

## 2017-03-09 RX ORDER — POTASSIUM CHLORIDE 20 MEQ/1
20 TABLET, EXTENDED RELEASE ORAL DAILY
Qty: 7 TAB | Refills: 0 | Status: SHIPPED | OUTPATIENT
Start: 2017-03-09 | End: 2017-03-16

## 2017-03-09 RX ADMIN — POTASSIUM CHLORIDE 40 MEQ: 1500 TABLET, EXTENDED RELEASE ORAL at 19:37

## 2017-03-09 NOTE — LETTER
"  FORM C-4:  EMPLOYEE’S CLAIM FOR COMPENSATION/ REPORT OF INITIAL TREATMENT  EMPLOYEE’S CLAIM - PROVIDE ALL INFORMATION REQUESTED   First Name  Lucas Last Name  Iris Birthdate             Age  1975 41 y.o. 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 (if applicable)  Working in the Lab    How did this injury or occupational disease occur? Be specific and answer in detail. 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  n/a Witnesses to the Accident  n/a     Nature of Injury or Occupational Disease  Workers' Compensation  Part(s) of Body Injured or Affected  Lower Arm (L), N/A, N/A    I certify that the above is true and correct to the best of my knowledge and that I have provided this information in order to obtain the benefits of [place] " and [place]  I hereby authorize any physician, chiropractor, surgeon, practitioner, or other person, any hospital, including [facility] hospital, any medical service organization, any insurance company, or other institution or organization to release to each other, any medical or other information, including benefits paid or payable, pertinent to this injury or disease, except information relative to diagnosis, treatment and/or counseling for AIDS, psychological conditions, alcohol or controlled substances, for which I must give specific authorization.  A Photostat of this authorization shall be as valid as the original.   Date  March 9, 2017 Place  [facility] Employee’s Signature   THIS REPORT MUST BE COMPLETED AND MAILED WITHIN 3 WORKING DAYS OF TREATMENT   Place  [facility], EMERGENCY DEPT  Name of Facility   [facility]   Date  3/9/2017 Diagnosis  (R00.2) Palpitations  (E87.6) Hypokalemia Is there evidence the injured employee was under the influence of alcohol and/or another controlled substance at the time of accident?   Hour  7:40 PM Description of Injury or Disease  Palpitations  Hypokalemia No   Treatment  [facility] cardiology follow up  Have you advised the patient to remain off work five days or more?         No   X-Ray Findings      If Yes   From Date    To Date      From information given by the employee, together with medical evidence, can you directly connect this injury or occupational disease as job incurred?  No If No, is the employee capable of: Full Duty  Yes Modified Duty      Is additional medical care by a physician indicated?  Yes If Modified Duty, Specify any Limitations / Restrictions        Do you know of any previous injury or disease contributing to this condition or occupational disease?  No   Date  3/9/2017 Print Doctor’s  "Name  Petar Mayes certify the employer’s copy of this form was mailed on:   Address  [address] [place] [address] [phone] Insurer’s Use Only   [facility]  [address]    Provider’s Tax ID Number  [id number] Telephone  Dept: [phone]    Doctor’s Signature  e-PETAR Estrada M.D. Degree  MD    Original - TREATING PHYSICIAN OR CHIROPRACTOR   Pg 2-Insurer/TPA   Pg 3-Employer   Pg 4-Employee                                                                                                  Form C-4 (rev01/03)     BRIEF DESCRIPTION OF RIGHTS AND BENEFITS  (Pursuant to [place] 616C.[place])    Notice of Injury or Occupational Disease (Incident Report Form C-1): If an injury or occupational disease (OD) arises out of and in the course of employment, you must provide written notice to your employer as soon as practicable, but no later than 7 days after the accident or OD. Your employer shall maintain a sufficient supply of the required forms.    Claim for Compensation (Form C-4): If medical treatment is sought, the form C-4 is available at the place of initial treatment. A completed \"Claim for Compensation\" (Form C-4) must be filed within 90 days after an accident or OD. The treating physician or chiropractor must, within 3 working days after treatment, complete and mail to the employer, the employer's insurer and third-party , the Claim for Compensation.    Medical Treatment: If you require medical treatment for your on-the-job injury or OD, you may be required to select a physician or chiropractor from a list provided by your workers’ compensation insurer, if it has contracted with an Organization for Managed Care (MCO) or Preferred Provider Organization (PPO) or providers of health care. If your employer has not entered into a contract with an MCO or PPO, you may select a physician or chiropractor from the Panel of Physicians and Chiropractors. Any medical costs " related to your industrial injury or OD will be paid by your insurer.    Temporary Total Disability (TTD): If your doctor has certified that you are unable to work for a period of at least 5 consecutive days, or 5 cumulative days in a 20-day period, or places restrictions on you that your employer does not accommodate, you may be entitled to TTD compensation.    Temporary Partial Disability (TPD): If the wage you receive upon reemployment is less than the compensation for TTD to which you are entitled, the insurer may be required to pay you TPD compensation to make up the difference. TPD can only be paid for a maximum of 24 months.    Permanent Partial Disability (PPD): When your medical condition is stable and there is an indication of a PPD as a result of your injury or OD, within 30 days, your insurer must arrange for an evaluation by a rating physician or chiropractor to determine the degree of your PPD. The amount of your PPD award depends on the date of injury, the results of the PPD evaluation and your age and wage.    Permanent Total Disability (PTD): If you are medically certified by a treating physician or chiropractor as permanently and totally disabled and have been granted a PTD status by your insurer, you are entitled to receive monthly benefits not to exceed 66 2/3% of your average monthly wage. The amount of your PTD payments is subject to reduction if you previously received a PPD award.    Vocational Rehabilitation Services: You may be eligible for vocational rehabilitation services if you are unable to return to the job due to a permanent physical impairment or permanent restrictions as a result of your injury or occupational disease.    Transportation and Per Linda Reimbursement: You may be eligible for travel expenses and per linda associated with medical treatment.  Reopening: You may be able to reopen your claim if your condition worsens after claim closure.    Appeal Process: If you disagree  with a written determination issued by the insurer or the insurer does not respond to your request, you may appeal to the Department of Administration, , by following the instructions contained in your determination letter. You must appeal the determination within 70 days from the date of the determination letter at [address], [address], [place], [place] [address], or [address], [address], [place], [place] [address]. If you disagree with the  decision, you may appeal to the Department of Administration, . You must file your appeal within 30 days from the date of the  decision letter at [address], [address], [place], [place] [address], or [address], [address], [place], [place] [address]. If you disagree with a decision of an , you may file a petition for judicial review with the District Court. You must do so within 30 days of the Appeal Officer’s decision. You may be represented by an  at your own expense or you may contact the [facility] for possible representation.    [place]  for Injured Workers (NAIW): If you disagree with a  decision, you may request that NAIW represent you without charge at an  Hearing. For information regarding denial of benefits, you may contact the [facility] at: [address], [address], [place] [address], [phone], or [address], [place] [address], [phone]    To File a Complaint with the Division: If you wish to file a complaint with the  of the Division of Industrial Relations (DIR), please contact the Workers’ Compensation Section, [address], [address], [place], [place] [address], telephone [phone]) [phone], or [address], [address], [place], [place] [address], telephone [phone].    For assistance with Workers’ Compensation Issues: you may contact  the Office of the Governor Consumer Health Assistance, [address] [place] [address], [address], [place], [place] [address], Toll Free 1-813.396.6087, Web site: http://govcha.[facility].[place].us, E-mail [email or web address].                                                                                                                                                                               __________________________________________________________________                                    March 9, 2017            Employee Name / Signature                                                                                                                            Date                                       D-2 (rev. 10/07)

## 2017-03-09 NOTE — MR AVS SNAPSHOT
Lucas Simmons   3/9/2017 5:30 PM   Appointment   MRN: [id number]    Department:  [facility]   Dept Phone:  [phone]    Description:  Male : 1975   Provider:  [place] NON RENOWN MA           Allergies as of 3/9/2017     No Known Allergies      Vital Signs     Smoking Status                   Never Smoker            Basic Information     Date Of Birth Sex Race Ethnicity Preferred Language    1975 Male  Non- English      Your appointments     2017  2:45 PM   FOLLOW UP with Korina Dacosta M.D.   [facility] (--)    [address], [address]  [place] [address] [phone]              Health Maintenance        Date Due Completion Dates    IMM DTaP/Tdap/Td Vaccine (1 - Tdap) 1993 ---            Current Immunizations     Influenza Vaccine Quad Inj (Pf) 2016  2:01 PM      Below and/or attached are the medications your provider expects you to take. Review all of your home medications and newly ordered medications with your provider and/or pharmacist. Follow medication instructions as directed by your provider and/or pharmacist. Please keep your medication list with you and share with your provider. Update the information when medications are discontinued, doses are changed, or new medications (including over-the-counter products) are added; and carry medication information at all times in the event of emergency situations     Allergies:  No Known Allergies          Medications  Valid as of: March 15, 2017 -  8:24 AM    Generic Name Brand Name Tablet Size Instructions for use    DiltiaZEM HCl Coated Beads (CAPSULE SR 24 HR) CARDIZEM  MG Take 1 Cap by mouth every day.        Multiple Vitamin   Take  by mouth.        Potassium Chloride Inna CR (Tab CR) Kdur 20 MEQ Take 1 Tab by mouth every day for 7 days.        .                 Medicines prescribed today were sent to:     [id number] - [place], [place] - [address]    [address] [place] [address]    Phone: [phone] Fax: [phone]    Open 24 Hours?: No      Medication refill instructions:       If your prescription bottle indicates you have medication refills left, it is not necessary to call your provider’s office. Please contact your pharmacy and they will refill your medication.    If your prescription bottle indicates you do not have any refills left, you may request refills at any time through one of the following ways: The online [email or web address] system (except Urgent Care), by calling your provider’s office, or by asking your pharmacy to contact your provider’s office with a refill request. Medication refills are processed only during regular business hours and may not be available until the next business day. Your provider may request additional information or to have a follow-up visit with you prior to refilling your medication.   *Please Note: Medication refills are assigned a new Rx number when refilled electronically. Your pharmacy may indicate that no refills were authorized even though a new prescription for the same medication is available at the pharmacy. Please request the medicine by name with the pharmacy before contacting your provider for a refill.           [email or web address] Access Code: Activation code not generated  Current [email or web address] Status: Active

## 2017-03-09 NOTE — ED AVS SNAPSHOT
3/9/2017          Lucas Simmons  [address] [place] [address]    Dear Lucas:    [facility] wants to ensure your discharge home is safe and you or your loved ones have had all your questions answered regarding your care after you leave the hospital.    You may receive a telephone call within two days of your discharge.  This call is to make certain you understand your discharge instructions as well as ensure we provided you with the best care possible during your stay with us.     The call will only last approximately 3-5 minutes and will be done by a nurse.    Once again, we want to ensure your discharge home is safe and that you have a clear understanding of any next steps in your care.  If you have any questions or concerns, please do not hesitate to contact us, we are here for you.  Thank you for choosing [facility] for your healthcare needs.    Sincerely,    David Roman    [facility]

## 2017-03-09 NOTE — LETTER
"  FORM C-4:  EMPLOYEE’S CLAIM FOR COMPENSATION/ REPORT OF INITIAL TREATMENT  EMPLOYEE’S CLAIM - PROVIDE ALL INFORMATION REQUESTED   First Name  Lucas Last Name  Iris Birthdate             Age  1975 41 y.o. 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 (if applicable)  Working in the Lab    How did this injury or occupational disease occur? Be specific and answer in detail. 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  n/a Witnesses to the Accident  n/a     Nature of Injury or Occupational Disease  Workers' Compensation  Part(s) of Body Injured or Affected  Lower Arm (L), N/A, N/A    I certify that the above is true and correct to the best of my knowledge and that I have provided this information in order to obtain the benefits of [place] " and [place] or [place]).  I hereby authorize any physician, chiropractor, surgeon, practitioner, or other person, any hospital, including [facility] or [facility] hospital, any medical service organization, any insurance company, or other institution or organization to release to each other, any medical or other information, including benefits paid or payable, pertinent to this injury or disease, except information relative to diagnosis, treatment and/or counseling for AIDS, psychological conditions, alcohol or controlled substances, for which I must give specific authorization.  A Photostat of this authorization shall be as valid as the original.   Date Place   Employee’s Signature   THIS REPORT MUST BE COMPLETED AND MAILED WITHIN 3 WORKING DAYS OF TREATMENT   Place  [facility], EMERGENCY DEPT  Name of Facility   [facility]   Date  3/9/2017 Diagnosis  (R00.2) Palpitations  (E87.6) Hypokalemia Is there evidence the injured employee was under the influence of alcohol and/or another controlled substance at the time of accident?   Hour  7:29 PM Description of Injury or Disease  Palpitations  Hypokalemia     Treatment     Have you advised the patient to remain off work five days or more?             X-Ray Findings      If Yes   From Date    To Date      From information given by the employee, together with medical evidence, can you directly connect this injury or occupational disease as job incurred?    If No, is the employee capable of: Full Duty    Modified Duty      Is additional medical care by a physician indicated?    If Modified Duty, Specify any Limitations / Restrictions        Do you know of any previous injury or disease contributing to this condition or occupational disease?      Date  3/9/2017 Print Doctor’s Name  Petar Mayes I certify the employer’s copy of this form was mailed  "on:   Address  [address] [place] [address] [phone] Insurer’s Use Only   [facility] Zip  [address]    Provider’s Tax ID Number    Telephone  Dept: [phone]    Doctor’s Signature    Degree       Original - TREATING PHYSICIAN OR CHIROPRACTOR   Pg 2-Insurer/TPA   Pg 3-Employer   Pg 4-Employee                                                                                                  Form C-4 (rev01/03)     BRIEF DESCRIPTION OF RIGHTS AND BENEFITS  (Pursuant to [place] 616C.[place])    Notice of Injury or Occupational Disease (Incident Report Form C-1): If an injury or occupational disease (OD) arises out of and in the course of employment, you must provide written notice to your employer as soon as practicable, but no later than 7 days after the accident or OD. Your employer shall maintain a sufficient supply of the required forms.    Claim for Compensation (Form C-4): If medical treatment is sought, the form C-4 is available at the place of initial treatment. A completed \"Claim for Compensation\" (Form C-4) must be filed within 90 days after an accident or OD. The treating physician or chiropractor must, within 3 working days after treatment, complete and mail to the employer, the employer's insurer and third-party , the Claim for Compensation.    Medical Treatment: If you require medical treatment for your on-the-job injury or OD, you may be required to select a physician or chiropractor from a list provided by your workers’ compensation insurer, if it has contracted with an Organization for Managed Care (MCO) or Preferred Provider Organization (PPO) or providers of health care. If your employer has not entered into a contract with an MCO or PPO, you may select a physician or chiropractor from the Panel of Physicians and Chiropractors. Any medical costs related to your industrial injury or OD will be paid by your insurer.    Temporary Total Disability (TTD): If your " doctor has certified that you are unable to work for a period of at least 5 consecutive days, or 5 cumulative days in a 20-day period, or places restrictions on you that your employer does not accommodate, you may be entitled to TTD compensation.    Temporary Partial Disability (TPD): If the wage you receive upon reemployment is less than the compensation for TTD to which you are entitled, the insurer may be required to pay you TPD compensation to make up the difference. TPD can only be paid for a maximum of 24 months.    Permanent Partial Disability (PPD): When your medical condition is stable and there is an indication of a PPD as a result of your injury or OD, within 30 days, your insurer must arrange for an evaluation by a rating physician or chiropractor to determine the degree of your PPD. The amount of your PPD award depends on the date of injury, the results of the PPD evaluation and your age and wage.    Permanent Total Disability (PTD): If you are medically certified by a treating physician or chiropractor as permanently and totally disabled and have been granted a PTD status by your insurer, you are entitled to receive monthly benefits not to exceed 66 2/3% of your average monthly wage. The amount of your PTD payments is subject to reduction if you previously received a PPD award.    Vocational Rehabilitation Services: You may be eligible for vocational rehabilitation services if you are unable to return to the job due to a permanent physical impairment or permanent restrictions as a result of your injury or occupational disease.    Transportation and Per Linda Reimbursement: You may be eligible for travel expenses and per linda associated with medical treatment.  Reopening: You may be able to reopen your claim if your condition worsens after claim closure.    Appeal Process: If you disagree with a written determination issued by the insurer or the insurer does not respond to your request, you may appeal to  the Department of Administration, , by following the instructions contained in your determination letter. You must appeal the determination within 70 days from the date of the determination letter at [address], [address], [place], [place] [address], or [address], [address], [place], [place] [address]. If you disagree with the  decision, you may appeal to the Department of Administration, . You must file your appeal within 30 days from the date of the  decision letter at [address], [address], [place], [place] [address], or [address], [address], [place], [place] [address]. If you disagree with a decision of an , you may file a petition for judicial review with the District Court. You must do so within 30 days of the Appeal Officer’s decision. You may be represented by an  at your own expense or you may contact the [facility] for possible representation.    [place]  for Injured Workers (NAIW): If you disagree with a  decision, you may request that NAIW represent you without charge at an  Hearing. For information regarding denial of benefits, you may contact the [facility] at: [address], [address], [place] [address], [phone], or [address], [place] [address], [phone]    To File a Complaint with the Division: If you wish to file a complaint with the  of the Division of Industrial Relations (DIR), please contact the Workers’ Compensation Section, [address], [address], [place], [place] [address], telephone [phone], or [address], [address], [place] [address], telephone [phone].    For assistance with Workers’ Compensation Issues: you may contact the Office of the Governor Consumer Health Assistance, [address], [address], [place], [place] [address],  Toll Free 1-842.252.5530, Web site: http://yahaira..nv., E-mail vijaya@yahaira..[email or web address].                                                                                                                                                                               __________________________________________________________________                                    _________________            Employee Name / Signature                                                                                                                            Date                                       D-2 (rev. 10/07)

## 2017-03-09 NOTE — ED AVS SNAPSHOT
Resolver Access Code: Activation code not generated  Current Resolver Status: Active    [email or web address]  A secure, online tool to manage your health information     [employer] Resolver® is a secure, online tool that connects you to your personalized health information from the privacy of your home -- day or night - making it very easy for you to manage your healthcare. Once the activation process is completed, you can even access your medical information using the Resolver haja, which is available for free in the Apple Haja store or Google Play store.     Resolver provides the following levels of access (as shown below):   My Chart Features   [facility] Primary Care Doctor [facility]  Specialists [facility]  Urgent  Care Non-[facility]  Primary Care  Doctor   Email your healthcare team securely and privately 24/7 X X X X   Manage appointments: schedule your next appointment; view details of past/upcoming appointments X      Request prescription refills. X      View recent personal medical records, including lab and immunizations X X X X   View health record, including health history, allergies, medications X X X X   Read reports about your outpatient visits, procedures, consult and ER notes X X X X   See your discharge summary, which is a recap of your hospital and/or ER visit that includes your diagnosis, lab results, and care plan. X X       How to register for Resolver:  1. Go to  [email or web address].  2. Click on the Sign Up Now box, which takes you to the New Member Sign Up page. You will need to provide the following information:  a. Enter your Resolver Access Code exactly as it appears at the top of this page. (You will not need to use this code after you’ve completed the sign-up process. If you do not sign up before the expiration date, you must request a new code.)   b. Enter your date of birth.   c. Enter your home email address.   d. Click Submit, and follow the next screen’s instructions.  3. Create a Resolver ID. This will  be your ethority login ID and cannot be changed, so think of one that is secure and easy to remember.  4. Create a ethority password. You can change your password at any time.  5. Enter your Password Reset Question and Answer. This can be used at a later time if you forget your password.   6. Enter your e-mail address. This allows you to receive e-mail notifications when new information is available in ethority.  7. Click Sign Up. You can now view your health information.    For assistance activating your ethority account, call [phone]

## 2017-03-10 ENCOUNTER — OFFICE VISIT (OUTPATIENT)
Dept: CARDIOLOGY | Facility: MEDICAL CENTER | Age: 42
End: 2017-03-10
Payer: COMMERCIAL

## 2017-03-10 VITALS
OXYGEN SATURATION: 97 % | SYSTOLIC BLOOD PRESSURE: 92 MMHG | BODY MASS INDEX: 30.05 KG/M2 | HEART RATE: 80 BPM | DIASTOLIC BLOOD PRESSURE: 60 MMHG | HEIGHT: 64 IN | WEIGHT: 176 LBS

## 2017-03-10 DIAGNOSIS — I49.1 PREMATURE ATRIAL COMPLEX: ICD-10-CM

## 2017-03-10 DIAGNOSIS — I49.3 PREMATURE VENTRICULAR COMPLEX: ICD-10-CM

## 2017-03-10 DIAGNOSIS — I49.9 CARDIAC ARRHYTHMIA, UNSPECIFIED CARDIAC ARRHYTHMIA TYPE: ICD-10-CM

## 2017-03-10 DIAGNOSIS — R00.2 PALPITATIONS: ICD-10-CM

## 2017-03-10 DIAGNOSIS — R07.89 ATYPICAL CHEST PAIN: ICD-10-CM

## 2017-03-10 PROCEDURE — 99214 OFFICE O/P EST MOD 30 MIN: CPT | Performed by: INTERNAL MEDICINE

## 2017-03-10 RX ORDER — DILTIAZEM HYDROCHLORIDE 120 MG/1
120 CAPSULE, COATED, EXTENDED RELEASE ORAL DAILY
Qty: 30 CAP | Refills: 11 | Status: SHIPPED | OUTPATIENT
Start: 2017-03-10 | End: 2017-07-20

## 2017-03-10 ASSESSMENT — ENCOUNTER SYMPTOMS
BRUISES/BLEEDS EASILY: 0
DEPRESSION: 0
FALLS: 0
CHILLS: 0
ORTHOPNEA: 0
HALLUCINATIONS: 0
BLOOD IN STOOL: 0
PND: 0
FEVER: 0
ABDOMINAL PAIN: 0
COUGH: 0
VOMITING: 0
DOUBLE VISION: 0
BLURRED VISION: 0
SHORTNESS OF BREATH: 0
MYALGIAS: 0
HEADACHES: 0
SPEECH CHANGE: 0
PALPITATIONS: 1
EYE PAIN: 0
WEIGHT LOSS: 0
NAUSEA: 0
SENSORY CHANGE: 0
CLAUDICATION: 0
LOSS OF CONSCIOUSNESS: 0
EYE DISCHARGE: 0
DIZZINESS: 0

## 2017-03-10 NOTE — ED NOTES
Pt amb to lobby feeling faint. 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 Pt denies CP. C/O ocassional palpitations. EKG cleared. Pt also c/o L arm weakness since Tuesday. Pt brought to room 3.

## 2017-03-10 NOTE — MR AVS SNAPSHOT
"Lucas Simmons   3/10/2017 4:00 PM   Office Visit   MRN: [id number]    Department:  [facility]   Dept Phone:  [phone]    Description:  Male : 1975   Provider:  Korina Dacosta M.D.           Reason for Visit     Hospital Follow-up           Allergies as of 3/10/2017     No Known Allergies      You were diagnosed with     Palpitations   [785.1.ICD-9-CM]       Premature atrial complex   [469770]       Premature ventricular complex   [525174]       Atypical chest pain   [220092]         Vital Signs     Blood Pressure Pulse Height Weight Body Mass Index Oxygen Saturation    92/60 mmHg 80 1.626 m (5' 4.02\") 79.833 kg (176 lb) 30.20 kg/m2 97%    Smoking Status                   Never Smoker            Basic Information     Date Of Birth Sex Race Ethnicity Preferred Language    1975 Male  Non- English      Your appointments     2017  2:45 PM   FOLLOW UP with Korina Dacosta M.D.   [facility] (--)    [address], [facility] [address]  [facility] [address]   [phone]              Health Maintenance        Date Due Completion Dates    IMM DTaP/Tdap/Td Vaccine (1 - Tdap) 1993 ---            Current Immunizations     Influenza Vaccine Quad Inj (Pf) 2016  2:01 PM      Below and/or attached are the medications your provider expects you to take. Review all of your home medications and newly ordered medications with your provider and/or pharmacist. Follow medication instructions as directed by your provider and/or pharmacist. Please keep your medication list with you and share with your provider. Update the information when medications are discontinued, doses are changed, or new medications (including over-the-counter products) are added; and carry medication information at all times in the event of emergency situations     Allergies:  No Known Allergies          Medications  Valid as of: March 10, 2017 -  4:11 PM    Generic Name " Brand Name Tablet Size Instructions for use    DiltiaZEM HCl Coated Beads (CAPSULE SR 24 HR) CARDIZEM  MG Take 1 Cap by mouth every day.        Multiple Vitamin   Take  by mouth.        Potassium Chloride Inna CR (Tab CR) Kdur 20 MEQ Take 1 Tab by mouth every day for 7 days.        .                 Medicines prescribed today were sent to:     [id number] - [place], [place] - [address]    [address] [place] [address]    Phone: [phone] Fax: [phone]    Open 24 Hours?: No      Medication refill instructions:       If your prescription bottle indicates you have medication refills left, it is not necessary to call your provider’s office. Please contact your pharmacy and they will refill your medication.    If your prescription bottle indicates you do not have any refills left, you may request refills at any time through one of the following ways: The online [employer] system (except Urgent Care), by calling your provider’s office, or by asking your pharmacy to contact your provider’s office with a refill request. Medication refills are processed only during regular business hours and may not be available until the next business day. Your provider may request additional information or to have a follow-up visit with you prior to refilling your medication.   *Please Note: Medication refills are assigned a new Rx number when refilled electronically. Your pharmacy may indicate that no refills were authorized even though a new prescription for the same medication is available at the pharmacy. Please request the medicine by name with the pharmacy before contacting your provider for a refill.           [employer] Access Code: Activation code not generated  Current [employer] Status: Active

## 2017-03-10 NOTE — ED NOTES
ER MD in to see patient with lab results and tests ordered. Discharge instructions provided.  Pt verbalized the understanding of discharge instructions to follow up with PCP and to return to ER if condition worsens.  Pt ambulated out of ER without difficulty.

## 2017-03-11 NOTE — PROGRESS NOTES
Subjective:   Lucas Simmons is a 41 y.o. male who presents today for cardiac care of PAC, PVC. He has had multiple ER visits for palpitations. No chest pain.     Holter in the past showed evidence of PAC and PVC. Had a normal echocardiogram stress test.    Past Medical History   Diagnosis Date   • Arrhythmia      History reviewed. No pertinent past surgical history.  Family History   Problem Relation Age of Onset   • Heart Disease Mother    • Hyperlipidemia Mother    • Hypertension Mother      History   Smoking status   • Never Smoker    Smokeless tobacco   • Never Used     No Known Allergies  Outpatient Encounter Prescriptions as of 3/10/2017   Medication Sig Dispense Refill   • diltiazem CD (CARDIZEM CD) 120 MG CAPSULE SR 24 HR Take 1 Cap by mouth every day. 30 Cap 11   • potassium chloride SA (KDUR) 20 MEQ Tab CR Take 1 Tab by mouth every day for 7 days. 7 Tab 0   • Multiple Vitamin (MULTI VITAMIN DAILY PO) Take  by mouth.       No facility-administered encounter medications on file as of 3/10/2017.     Review of Systems   Constitutional: Negative for fever, chills, weight loss and malaise/fatigue.   HENT: Negative for ear discharge, ear pain, hearing loss and nosebleeds.    Eyes: Negative for blurred vision, double vision, pain and discharge.   Respiratory: Negative for cough and shortness of breath.    Cardiovascular: Positive for palpitations. Negative for chest pain, orthopnea, claudication, leg swelling and PND.   Gastrointestinal: Negative for nausea, vomiting, abdominal pain, blood in stool and melena.   Genitourinary: Negative for dysuria and hematuria.   Musculoskeletal: Negative for myalgias, joint pain and falls.   Skin: Negative for itching and rash.   Neurological: Negative for dizziness, sensory change, speech change, loss of consciousness and headaches.   Endo/Heme/Allergies: Negative for environmental allergies. Does not bruise/bleed easily.   Psychiatric/Behavioral: Negative for depression,  "suicidal ideas and hallucinations.        Objective:   BP 92/60 mmHg  Pulse 80  Ht 1.626 m (5' 4.02\")  Wt 79.833 kg (176 lb)  BMI 30.20 kg/m2  SpO2 97%    Physical Exam   Constitutional: He is oriented to person, place, and time. No distress.   HENT:   Head: Normocephalic and atraumatic.   Eyes: EOM are normal.   Neck: Normal range of motion. No JVD present.   Cardiovascular: Normal rate, regular rhythm, normal heart sounds and intact distal pulses.  Exam reveals no gallop and no friction rub.    No murmur heard.  Bilateral femoral pulses are 2+, bilateral dorsalis pedis pulses are 2+, bilateral posterior tibialis pulses are 2+.   Pulmonary/Chest: No respiratory distress. He has no wheezes. He has no rales. He exhibits no tenderness.   Abdominal: Soft. Bowel sounds are normal. There is no tenderness. There is no rebound and no guarding.   The is no presence of abdominal bruits   Musculoskeletal: Normal range of motion. He exhibits no edema or tenderness.   Neurological: He is alert and oriented to person, place, and time.   Skin: Skin is warm and dry.   Psychiatric: He has a normal mood and affect.   Nursing note and vitals reviewed.      Assessment:     1. Palpitations  diltiazem CD (CARDIZEM CD) 120 MG CAPSULE SR 24 HR   2. Premature atrial complex  diltiazem CD (CARDIZEM CD) 120 MG CAPSULE SR 24 HR   3. Premature ventricular complex  diltiazem CD (CARDIZEM CD) 120 MG CAPSULE SR 24 HR   4. Atypical chest pain  diltiazem CD (CARDIZEM CD) 120 MG CAPSULE SR 24 HR       Medical Decision Making:  Today's Assessment / Status / Plan:     Trial of Cardizem 120 mg po daily.    I will see patient back in clinic with lab tests and studies results in 3 months.    "

## 2017-03-13 ENCOUNTER — TELEPHONE (OUTPATIENT)
Dept: CARDIOLOGY | Facility: MEDICAL CENTER | Age: 42
End: 2017-03-13

## 2017-03-13 NOTE — TELEPHONE ENCOUNTER
Noted.  Message        Referral to acupuncture has been canceled due to patient insurance.        All HMO plans require primary care to process referral.        Thank You        L/m for pt to inform he needs to get referral placed by PCP

## 2017-03-15 NOTE — PROGRESS NOTES
Katty ECHAVARRIA was called out after business hours to [facility] for a post-accident UDS and BAT on 3/9/17 for [facility].    UDS collected at 6:50 pm.  BAT collected at 6:35 pm.

## 2017-04-03 ENCOUNTER — TELEPHONE (OUTPATIENT)
Dept: CARDIOLOGY | Facility: MEDICAL CENTER | Age: 42
End: 2017-04-03

## 2017-04-03 NOTE — TELEPHONE ENCOUNTER
----- Message from Brittani Kennedy sent at 4/3/2017 10:19 AM PDT -----  Regarding: diltiazem advice  Contact: [phone]  NADEEM/raj    Pt calling to discuss diltiazem CD (CARDIZEM CD) 120 MG.  Pt has been reading about this med and is scared to take it because he is afraid the dosage is too high and will lower b/p too much.  Please call pt work today to advise, [phone] until 8pm.

## 2017-04-03 NOTE — TELEPHONE ENCOUNTER
Pt called concerned that being as his BP is low the dose of Cardizem CD to help with his palpitations may cause his BP to drop to lower level.  He will not take until until BP issue is reviewed by Dr. Dacosta.

## 2017-04-04 NOTE — TELEPHONE ENCOUNTER
Pt left message on VM of cell phone to call.  Could not leave message as VM does not identify himself.

## 2017-07-20 ENCOUNTER — OFFICE VISIT (OUTPATIENT)
Dept: URGENT CARE | Facility: CLINIC | Age: 42
End: 2017-07-20
Payer: COMMERCIAL

## 2017-07-20 VITALS
OXYGEN SATURATION: 95 % | DIASTOLIC BLOOD PRESSURE: 78 MMHG | HEART RATE: 73 BPM | HEIGHT: 64 IN | TEMPERATURE: 98.1 F | RESPIRATION RATE: 14 BRPM | WEIGHT: 168 LBS | SYSTOLIC BLOOD PRESSURE: 100 MMHG | BODY MASS INDEX: 28.68 KG/M2

## 2017-07-20 DIAGNOSIS — R42 DIZZY: ICD-10-CM

## 2017-07-20 DIAGNOSIS — R00.2 PALPITATIONS: ICD-10-CM

## 2017-07-20 PROCEDURE — 99214 OFFICE O/P EST MOD 30 MIN: CPT | Performed by: FAMILY MEDICINE

## 2017-07-20 PROCEDURE — 93000 ELECTROCARDIOGRAM COMPLETE: CPT | Performed by: FAMILY MEDICINE

## 2017-07-20 RX ORDER — DIAZEPAM 5 MG/1
5 TABLET ORAL EVERY 12 HOURS PRN
Qty: 30 TAB | Refills: 0 | Status: SHIPPED | OUTPATIENT
Start: 2017-07-20 | End: 2018-05-17

## 2017-07-20 ASSESSMENT — ENCOUNTER SYMPTOMS
FOCAL WEAKNESS: 0
FEVER: 0
DIZZINESS: 0
CHILLS: 0

## 2017-07-20 NOTE — PROGRESS NOTES
"Subjective:      Lucas Simmons is a 41 y.o. male who presents with Other    Chief Complaint   Patient presents with   • Other     Light headedness and possible tachycardia        - This is a very pleasant 41 y.o. male with complaints of on/off palpitations, feeling lots stress, non-specific dizziness at times over past year. Had full work up stress echo w/ Cards and told not \"heart attack\", given cardizem but he never took, was afraid to take it. Today was feeling stressed and felt some increased heart rate, strong heart beats and skipped heart beats so came in to get checked out. Feels better now.           ALLERGIES:  Review of patient's allergies indicates no known allergies.     PMH:  Past Medical History   Diagnosis Date   • Arrhythmia         MEDS:    Current outpatient prescriptions:   •  metoprolol (LOPRESSOR) 25 MG Tab, Take 1 Tab by mouth every 12 hours for 30 days., Disp: 60 Tab, Rfl: 3  •  diazepam (VALIUM) 5 MG Tab, Take 1 Tab by mouth every 12 hours as needed for Anxiety., Disp: 30 Tab, Rfl: 0  •  Multiple Vitamin (MULTI VITAMIN DAILY PO), Take  by mouth., Disp: , Rfl:     ** I have documented what I find to be significant in regards to past medical, social, family and surgical history  in my HPI or under PMH/PSH/FH review section, otherwise it is contributory **             HPI    Review of Systems   Constitutional: Negative for fever and chills.   Neurological: Negative for dizziness and focal weakness.          Objective:     /78 mmHg  Pulse 73  Temp(Src) 36.7 °C (98.1 °F)  Resp 14  Ht 1.626 m (5' 4.02\")  Wt 76.204 kg (168 lb)  BMI 28.82 kg/m2  SpO2 95%     Physical Exam   Constitutional: He appears well-developed. No distress.   HENT:   Head: Normocephalic and atraumatic.   Eyes: Conjunctivae are normal.   Neck: Neck supple.   Cardiovascular: Regular rhythm.    No murmur heard.  Pulmonary/Chest: Effort normal. No respiratory distress.   Neurological: He is alert. He exhibits normal " muscle tone.   Skin: Skin is warm and dry.   Psychiatric: He has a normal mood and affect. Judgment normal.   Nursing note and vitals reviewed.              Assessment/Plan:         1. Dizzy  EKG - Clinic performed   2. Palpitations  metoprolol (LOPRESSOR) 25 MG Tab    diazepam (VALIUM) 5 MG Tab       EKG: NSR      Dx & d/c instructions discussed w/ patient and/or family members. Follow up w/ Prvt Dr or here in 3-4 days if not getting better, sooner if needed,  ER if worse and UC/PCP unavailable.        Possible side effects (i.e. Rash, GI upset/constipation, sedation, elevation of BP or sugars) of any medications given discussed.

## 2017-07-20 NOTE — MR AVS SNAPSHOT
"        Lucas Simmons   2017 3:00 PM   Office Visit   MRN: [id number]    Department:  [facility]   Dept Phone:  [phone]    Description:  Male : 1975   Provider:  Semaj Guan M.D.           Reason for Visit     Other Light headedness and possible tachycardia      Allergies as of 2017     No Known Allergies      You were diagnosed with     Dizzy   [726342]       Palpitations   [785.1.ICD-9-CM]         Vital Signs     Blood Pressure Pulse Temperature Respirations Height Weight    100/78 mmHg 73 36.7 °C (98.1 °F) 14 1.626 m (5' 4.02\") 76.204 kg (168 lb)    Body Mass Index Oxygen Saturation Smoking Status             28.82 kg/m2 95% Never Smoker          Basic Information     Date Of Birth Sex Race Ethnicity Preferred Language    1975 Male  Non- English      Your appointments     2017  7:40 AM   Access New Patient with Jenny Frye M.D.   [facility] [place] (--)    [address]  [address]  [place] [address] [phone]           Please bring Photo ID, Insurance Cards, All Medication Bottles and copies of any legal documents (such as Living Will, Power of ) If speaking a language besides English please bring an adult . Please arrive 30 minutes prior for check in and registration. You will be receiving a confirmation call a few days before your appointment from our automated call confirmation system.            Aug 17, 2017  2:15 PM   FOLLOW UP with Korina Dacosta M.D.   [facility] (--)    [address]  [place] [id number] [phone]              Health Maintenance        Date Due Completion Dates    IMM DTaP/Tdap/Td Vaccine (1 - Tdap) 1993 ---    IMM INFLUENZA (1) 2017            Results     EKG - Clinic performed                   Current Immunizations     Influenza Vaccine Quad Inj (Pf) 2016  2:01 PM      Below and/or attached are the " medications your provider expects you to take. Review all of your home medications and newly ordered medications with your provider and/or pharmacist. Follow medication instructions as directed by your provider and/or pharmacist. Please keep your medication list with you and share with your provider. Update the information when medications are discontinued, doses are changed, or new medications (including over-the-counter products) are added; and carry medication information at all times in the event of emergency situations     Allergies:  No Known Allergies          Medications  Valid as of: July 20, 2017 -  3:45 PM    Generic Name Brand Name Tablet Size Instructions for use    DiazePAM (Tab) VALIUM 5 MG Take 1 Tab by mouth every 12 hours as needed for Anxiety.        Metoprolol Tartrate (Tab) LOPRESSOR 25 MG Take 1 Tab by mouth every 12 hours for 30 days.        Multiple Vitamin   Take  by mouth.        .                 Medicines prescribed today were sent to:     [facility]/PHARMACY #[id number] - [place], [place] - [address]    [address] [place] [address]    Phone: [phone] Fax: [phone]    Open 24 Hours?: No      Medication refill instructions:       If your prescription bottle indicates you have medication refills left, it is not necessary to call your provider’s office. Please contact your pharmacy and they will refill your medication.    If your prescription bottle indicates you do not have any refills left, you may request refills at any time through one of the following ways: The online [employer] system (except Urgent Care), by calling your provider’s office, or by asking your pharmacy to contact your provider’s office with a refill request. Medication refills are processed only during regular business hours and may not be available until the next business day. Your provider may request additional information or to have a follow-up visit with you prior to refilling your medication.   *Please Note:  Medication refills are assigned a new Rx number when refilled electronically. Your pharmacy may indicate that no refills were authorized even though a new prescription for the same medication is available at the pharmacy. Please request the medicine by name with the pharmacy before contacting your provider for a refill.           [email or web address] Access Code: Activation code not generated  Current [employer] Status: Active

## 2017-07-26 ENCOUNTER — TELEPHONE (OUTPATIENT)
Dept: MEDICAL GROUP | Facility: PHYSICIAN GROUP | Age: 42
End: 2017-07-26

## 2017-07-26 NOTE — TELEPHONE ENCOUNTER
Left message for patient to call back regarding pre-visit planning. Please transfer call to Tena at [phone]

## 2017-07-27 ENCOUNTER — OFFICE VISIT (OUTPATIENT)
Dept: MEDICAL GROUP | Facility: PHYSICIAN GROUP | Age: 42
End: 2017-07-27
Payer: COMMERCIAL

## 2017-07-27 ENCOUNTER — APPOINTMENT (OUTPATIENT)
Dept: MEDICAL GROUP | Facility: PHYSICIAN GROUP | Age: 42
End: 2017-07-27
Payer: COMMERCIAL

## 2017-07-27 VITALS
DIASTOLIC BLOOD PRESSURE: 60 MMHG | TEMPERATURE: 98.6 F | HEART RATE: 78 BPM | RESPIRATION RATE: 18 BRPM | HEIGHT: 64 IN | SYSTOLIC BLOOD PRESSURE: 110 MMHG | WEIGHT: 169 LBS | OXYGEN SATURATION: 93 % | BODY MASS INDEX: 28.85 KG/M2

## 2017-07-27 DIAGNOSIS — G47.00 INSOMNIA, UNSPECIFIED TYPE: ICD-10-CM

## 2017-07-27 DIAGNOSIS — R07.9 LEFT SIDED CHEST PAIN: ICD-10-CM

## 2017-07-27 DIAGNOSIS — R00.2 PALPITATIONS: ICD-10-CM

## 2017-07-27 DIAGNOSIS — R20.0 LEFT ARM NUMBNESS: ICD-10-CM

## 2017-07-27 PROCEDURE — 99214 OFFICE O/P EST MOD 30 MIN: CPT | Performed by: FAMILY MEDICINE

## 2017-07-27 ASSESSMENT — PATIENT HEALTH QUESTIONNAIRE - PHQ9: CLINICAL INTERPRETATION OF PHQ2 SCORE: 0

## 2017-07-27 ASSESSMENT — PAIN SCALES - GENERAL: PAINLEVEL: 1=MINIMAL PAIN

## 2017-07-27 NOTE — MR AVS SNAPSHOT
"        Lucas Simmons   2017 3:20 PM   Office Visit   MRN: [id number]    Department:  [facility]   Dept Phone:  [phone]    Description:  Male : 1975   Provider:  Jenny Frye M.D.           Reason for Visit     Establish Care new pt       Allergies as of 2017     No Known Allergies      You were diagnosed with     Palpitations   [785.1.ICD-9-CM]       Left sided chest pain   [7927524]       Insomnia, unspecified type   [5690242]       Left arm numbness   [860928]         Vital Signs     Blood Pressure Pulse Temperature Respirations Height Weight    110/60 mmHg 78 37 °C (98.6 °F) 18 1.626 m (5' 4.02\") 76.658 kg (169 lb)    Body Mass Index Oxygen Saturation Smoking Status             28.99 kg/m2 93% Never Smoker          Basic Information     Date Of Birth Sex Race Ethnicity Preferred Language    1975 Male  Non- English      Your appointments     Aug 17, 2017  2:15 PM   FOLLOW UP with Korina Dacosta M.D.   [facility] (--)    [address] [place] [address] [address]  [place] [id number] [phone]            Oct 26, 2017 10:40 AM   Established Patient with Jenny Frye M.D.   [facility] [place] (--)    [address]  [address]  [place] [id number] [phone]           You will be receiving a confirmation call a few days before your appointment from our automated call confirmation system.              Health Maintenance        Date Due Completion Dates    IMM DTaP/Tdap/Td Vaccine (1 - Tdap) 1993 ---    IMM INFLUENZA (1) 2017            Current Immunizations     Influenza Vaccine Quad Inj (Pf) 2016  2:01 PM      Below and/or attached are the medications your provider expects you to take. Review all of your home medications and newly ordered medications with your provider and/or pharmacist. Follow medication instructions as directed by your provider and/or pharmacist. Please keep your medication list with you " and share with your provider. Update the information when medications are discontinued, doses are changed, or new medications (including over-the-counter products) are added; and carry medication information at all times in the event of emergency situations     Allergies:  No Known Allergies          Medications  Valid as of: July 27, 2017 -  3:55 PM    Generic Name Brand Name Tablet Size Instructions for use    DiazePAM (Tab) VALIUM 5 MG Take 1 Tab by mouth every 12 hours as needed for Anxiety.        Metoprolol Tartrate (Tab) LOPRESSOR 25 MG Take 1 Tab by mouth every 12 hours for 30 days.        Multiple Vitamin   Take  by mouth.        .                 Medicines prescribed today were sent to:     [facility] #[id number] - [place], [place] - [address]    [address] [place] [address]    Phone: [phone] Fax: [phone]    Open 24 Hours?: No      Medication refill instructions:       If your prescription bottle indicates you have medication refills left, it is not necessary to call your provider’s office. Please contact your pharmacy and they will refill your medication.    If your prescription bottle indicates you do not have any refills left, you may request refills at any time through one of the following ways: The online [employer] system (except Urgent Care), by calling your provider’s office, or by asking your pharmacy to contact your provider’s office with a refill request. Medication refills are processed only during regular business hours and may not be available until the next business day. Your provider may request additional information or to have a follow-up visit with you prior to refilling your medication.   *Please Note: Medication refills are assigned a new Rx number when refilled electronically. Your pharmacy may indicate that no refills were authorized even though a new prescription for the same medication is available at the pharmacy. Please request the medicine by name with the pharmacy  before contacting your provider for a refill.        Your To Do List     Future Labs/Procedures Complete By Expires    COMP METABOLIC PANEL  As directed 7/28/2018    TSH  As directed 7/28/2018      Referral     A referral request has been sent to our patient care coordination department. Please allow 3-5 business days for us to process this request and contact you either by phone or mail. If you do not hear from us by the 5th business day, please call us at [phone].           [employer] Access Code: Activation code not generated  Current [employer] Status: Active

## 2017-07-28 NOTE — PROGRESS NOTES
Subjective:      Lucas Simmons is a 41 y.o. male who presents with Establish Care            HPI     This is a 41-year-old [place] male who is here as a new patient to get established with me.    He has history of palpitations and chest pain and has been seen and evaluated by the cardiologist last year. Stress echo did not show any ischemia or infarction. Echocardiogram came back normal ejection fraction without any valvular abnormalities. Heart monitor came back occasionally PACs and PVCs. He was initially given diltiazem but it caused hypotension and so he stopped taking the medication.    He was recently seen at urgent care on 7/20/17 for dizziness and palpitations. EKG done at that time came back sinus rhythm with rate of 64 without acute abnormalities. He was started on metoprolol 25 mg twice a day. He was also given Valium to help manage anxiety and insomnia. He said he is only taking the metoprolol 25 mg one tablet daily. He denies any side effects from the metoprolol. He feels this is working well for his palpitations. He said he takes half a tablet of the Valium at night and help significantly with his sleep.    He has been having problems with insomnia. He said he wakes up early and cannot go back to sleep anymore. He and his family moved to the area from [place], [place] a year ago. His work schedule has changed from morning to night shift. He thinks this may be the cause of his insomnia. He said he has tried melatonin before which has helped put him to sleep but he still wakes up after a few hours.    He has been having left-sided chest pain and left arm numbness. This started when he lifted the area. He thinks this could be due to lifting heavy boxes during the move. He thinks he could have strained the left side of the chest and may be caused damage to the nerve. He denies any neck pain. The numbness is constant 24 hours a day. Chest pain comes and goes. He also feels some tightness in the  epigastric area as if he can't get a full breath. He denies any cough, wheezing, shortness of breath. As mentioned above he already had cardiac evaluation including stress echo which did not show any evidence of ischemia or infarction.    [facility] blood work in 7/16 came back normal blood sugar and cholesterol panel.    I reviewed the following    Past Medical History   Diagnosis Date   • Arrhythmia         History reviewed. No pertinent past surgical history.    No Known Allergies    Current Outpatient Prescriptions   Medication Sig Dispense Refill   • metoprolol (LOPRESSOR) 25 MG Tab Take 1 Tab by mouth every 12 hours for 30 days. 60 Tab 3   • diazepam (VALIUM) 5 MG Tab Take 1 Tab by mouth every 12 hours as needed for Anxiety. 30 Tab 0   • Multiple Vitamin (MULTI VITAMIN DAILY PO) Take  by mouth.       No current facility-administered medications for this visit.        Family History   Problem Relation Age of Onset   • Hypertension Mother    • Cancer Father      liver cancer    • Alcohol/Drug Father    • Arthritis Sister    • Arthritis Brother    • Arthritis Brother    • Arthritis Brother        Social History     Social History   • Marital Status:      Spouse Name: N/A   • Number of Children: 2   • Years of Education: N/A     Occupational History   • [employer]      Social History Main Topics   • Smoking status: Never Smoker    • Smokeless tobacco: Never Used   • Alcohol Use: 0.0 oz/week     0 Standard drinks or equivalent per week      Comment: occasional   • Drug Use: No   • Sexual Activity:     Partners: Male     Other Topics Concern   • Not on file     Social History Narrative        ROS     Review of Systems  Constitutional: Negative for fever, chills, weight loss and malaise/fatigue.   HEENT: Negative for ear pain, nosebleeds, congestion, sore throat and neck pain.    Eyes: Negative for blurred vision.   Respiratory: Negative for cough, sputum production, shortness of breath and  "wheezing.    Cardiovascular: Left-sided chest pain, positive, palpitations, negative for skin orthopnea and leg swelling.   Gastrointestinal: Negative for heartburn, nausea, vomiting and abdominal pain.   Genitourinary: Negative for dysuria, urgency and frequency.   Musculoskeletal: Negative for myalgias, back pain and joint pain.   Skin: Negative for rash and itching.   Neurological: Negative for dizziness, tingling, tremors, sensory change, focal weakness and headaches. Left arm numbness   Endo/Heme/Allergies: Does not bruise/bleed easily.  Psychiatric/Behavioral: Negative for depression, anxiety, or memory loss. Positive insomnia    All other systems reviewed and are negative except as in HPI.       Objective:     /60 mmHg  Pulse 78  Temp(Src) 37 °C (98.6 °F)  Resp 18  Ht 1.626 m (5' 4.02\")  Wt 76.658 kg (169 lb)  BMI 28.99 kg/m2  SpO2 93%     Physical Exam   Constitutional: He is oriented to person, place, and time. He appears well-developed and well-nourished. No distress.   HENT:   Head: Normocephalic and atraumatic.   Right Ear: External ear normal.   Left Ear: External ear normal.   Nose: Nose normal.   Mouth/Throat: Oropharynx is clear and moist. No oropharyngeal exudate.   Eyes: Conjunctivae and EOM are normal. Pupils are equal, round, and reactive to light. Right eye exhibits no discharge. Left eye exhibits no discharge. No scleral icterus.   Neck: Normal range of motion. Neck supple. No JVD present. No tracheal deviation present. No thyromegaly present.   Cardiovascular: Normal rate, regular rhythm, normal heart sounds and intact distal pulses.  Exam reveals no gallop and no friction rub.    No murmur heard.  Pulmonary/Chest: Effort normal and breath sounds normal. No stridor. No respiratory distress. He has no wheezes. He has no rales. He exhibits no tenderness.   Abdominal: Soft. Bowel sounds are normal. He exhibits no distension and no mass. There is no tenderness. There is no rebound and " no guarding. No hernia.   Musculoskeletal: Normal range of motion. He exhibits no edema, tenderness or deformity.   Lymphadenopathy:     He has no cervical adenopathy.   Neurological: He is alert and oriented to person, place, and time. He has normal reflexes. He displays normal reflexes. No cranial nerve deficit. He exhibits normal muscle tone. Coordination normal.   Intact sensation to light touch upper extremities and lower extremities, strong , 5/5 upper and lower extremity motor strength, DTRs 2+   Skin: Skin is warm and dry. No rash noted. He is not diaphoretic. No erythema. No pallor.   Psychiatric: He has a normal mood and affect. His behavior is normal. Judgment and thought content normal.   Nursing note and vitals reviewed.                 Assessment/Plan:     1. Palpitations  Due to PACs and PVCs. He was just started on metoprolol last week. I advised him to take 25 mg half a tablet twice a day instead of one tablet daily since he is on short-acting metoprolol. I will check TSH to rule out thyroid related problem. We will check CMP together with healthy tracks. He has follow-up appointment scheduled with cardiologist in August. He will keep that appointment.  - TSH; Future  - COMP METABOLIC PANEL; Future    2. Left sided chest pain  Most likely from musculoskeletal pain. He had a normal stress echo.    3. Insomnia, unspecified type  I told the patient if melatonin works to take this medication instead of Valium. He was made aware that Valium has potential for dependence, tolerance, addiction.    4. Left arm numbness  We will refer for EMG to further evaluate the left arm numbness.  - REFERRAL TO NEURODIAGNOSTICS (EEG,EP,EMG/NCS/DBS) Modality Requested: EMG/NCS-Comment Extremities      Please note that this dictation was created using voice recognition software. I have worked with consultants from the vendor as well as technical experts from [employer] to optimize the interface. I have made every  reasonable attempt to correct obvious errors, but I expect that there are errors of grammar and possibly content I did not discover before finalizing the note.

## 2017-08-01 DIAGNOSIS — R07.9 LEFT SIDED CHEST PAIN: ICD-10-CM

## 2017-08-01 DIAGNOSIS — R00.2 PALPITATIONS: ICD-10-CM

## 2017-08-03 ENCOUNTER — NON-PROVIDER VISIT (OUTPATIENT)
Dept: NEUROLOGY | Facility: MEDICAL CENTER | Age: 42
End: 2017-08-03
Payer: COMMERCIAL

## 2017-08-03 DIAGNOSIS — R20.0 LEFT ARM NUMBNESS: ICD-10-CM

## 2017-08-03 PROCEDURE — 95909 NRV CNDJ TST 5-6 STUDIES: CPT | Performed by: PSYCHIATRY & NEUROLOGY

## 2017-08-03 PROCEDURE — 95886 MUSC TEST DONE W/N TEST COMP: CPT | Performed by: PSYCHIATRY & NEUROLOGY

## 2017-08-03 NOTE — MR AVS SNAPSHOT
Lucas Simmons   8/3/2017 1:45 PM   Non-Provider Visit   MRN: [id number]    Department:  [facility]   Dept Phone:  [phone]    Description:  Male : 1975   Provider:  NEURODIAGNOSTIC [facility]           Reason for Visit     Procedure           Allergies as of 8/3/2017     No Known Allergies      You were diagnosed with     Left arm numbness   [460251]         Vital Signs     Smoking Status                   Never Smoker            Basic Information     Date Of Birth Sex Race Ethnicity Preferred Language    1975 Male  Non- English      Your appointments     Aug 17, 2017  2:15 PM   FOLLOW UP with Korina Dacosta M.D.   [facility] (--)    [address]  [place] [address] [phone]            Oct 26, 2017 10:40 AM   Established Patient with Jenny Frye M.D.   [facility] [place] (--)    [address]  [address]  [place] [id number] [phone]           You will be receiving a confirmation call a few days before your appointment from our automated call confirmation system.              Health Maintenance        Date Due Completion Dates    IMM DTaP/Tdap/Td Vaccine (1 - Tdap) 1993 ---    IMM INFLUENZA (1) 2017            Current Immunizations     Influenza Vaccine Quad Inj (Pf) 2016  2:01 PM      Below and/or attached are the medications your provider expects you to take. Review all of your home medications and newly ordered medications with your provider and/or pharmacist. Follow medication instructions as directed by your provider and/or pharmacist. Please keep your medication list with you and share with your provider. Update the information when medications are discontinued, doses are changed, or new medications (including over-the-counter products) are added; and carry medication information at all times in the event of emergency situations     Allergies:  No Known Allergies         Medications  Valid as of: August 03, 2017 -  2:17 PM    Generic Name Brand Name Tablet Size Instructions for use    DiazePAM (Tab) VALIUM 5 MG Take 1 Tab by mouth every 12 hours as needed for Anxiety.        Metoprolol Tartrate (Tab) LOPRESSOR 25 MG Take 1 Tab by mouth every 12 hours for 30 days.        Multiple Vitamin   Take  by mouth.        .                 Medicines prescribed today were sent to:     [facility]ARMACY #[id number] - [place], [place] - [address]    [address] [place] [address]    Phone: [phone] Fax: [phone]    Open 24 Hours?: No      Medication refill instructions:       If your prescription bottle indicates you have medication refills left, it is not necessary to call your provider’s office. Please contact your pharmacy and they will refill your medication.    If your prescription bottle indicates you do not have any refills left, you may request refills at any time through one of the following ways: The online [employer] system (except Urgent Care), by calling your provider’s office, or by asking your pharmacy to contact your provider’s office with a refill request. Medication refills are processed only during regular business hours and may not be available until the next business day. Your provider may request additional information or to have a follow-up visit with you prior to refilling your medication.   *Please Note: Medication refills are assigned a new Rx number when refilled electronically. Your pharmacy may indicate that no refills were authorized even though a new prescription for the same medication is available at the pharmacy. Please request the medicine by name with the pharmacy before contacting your provider for a refill.           [employer] Access Code: Activation code not generated  Current [employer] Status: Active

## 2017-08-04 ENCOUNTER — HOSPITAL ENCOUNTER (OUTPATIENT)
Dept: LAB | Facility: MEDICAL CENTER | Age: 42
End: 2017-08-04
Attending: FAMILY MEDICINE
Payer: COMMERCIAL

## 2017-08-04 ENCOUNTER — HOSPITAL ENCOUNTER (OUTPATIENT)
Dept: LAB | Facility: MEDICAL CENTER | Age: 42
End: 2017-08-04
Payer: COMMERCIAL

## 2017-08-04 DIAGNOSIS — R00.2 PALPITATIONS: ICD-10-CM

## 2017-08-04 LAB
ALBUMIN SERPL BCP-MCNC: 4.2 G/DL (ref 3.2–4.9)
ALBUMIN/GLOB SERPL: 1.3 G/DL
ALP SERPL-CCNC: 84 U/L (ref 30–99)
ALT SERPL-CCNC: 29 U/L (ref 2–50)
ANION GAP SERPL CALC-SCNC: 5 MMOL/L (ref 0–11.9)
AST SERPL-CCNC: 25 U/L (ref 12–45)
BDY FAT % MEASURED: 25.3 %
BILIRUB SERPL-MCNC: 1 MG/DL (ref 0.1–1.5)
BP DIAS: 71 MMHG
BP SYS: 95 MMHG
BUN SERPL-MCNC: 11 MG/DL (ref 8–22)
CALCIUM SERPL-MCNC: 9.7 MG/DL (ref 8.5–10.5)
CHLORIDE SERPL-SCNC: 104 MMOL/L (ref 96–112)
CHOLEST SERPL-MCNC: 143 MG/DL (ref 100–199)
CO2 SERPL-SCNC: 29 MMOL/L (ref 20–33)
CREAT SERPL-MCNC: 0.93 MG/DL (ref 0.5–1.4)
DIABETES HTDIA: NO
EVENT NAME HTEVT: NORMAL
FASTING HTFAS: YES
GFR SERPL CREATININE-BSD FRML MDRD: >60 ML/MIN/1.73 M 2
GLOBULIN SER CALC-MCNC: 3.3 G/DL (ref 1.9–3.5)
GLUCOSE SERPL-MCNC: 77 MG/DL (ref 65–99)
GLUCOSE SERPL-MCNC: 82 MG/DL (ref 65–99)
HDLC SERPL-MCNC: 39 MG/DL
HYPERTENSION HTHYP: NO
LDLC SERPL CALC-MCNC: 78 MG/DL
POTASSIUM SERPL-SCNC: 3.8 MMOL/L (ref 3.6–5.5)
PROT SERPL-MCNC: 7.5 G/DL (ref 6–8.2)
SCREENING LOC CITY HTCIT: NORMAL
SCREENING LOC STATE HTSTA: NORMAL
SCREENING LOCATION HTLOC: NORMAL
SMOKING HTSMO: NO
SODIUM SERPL-SCNC: 138 MMOL/L (ref 135–145)
SUBSCRIBER ID HTSID: NORMAL
TRIGL SERPL-MCNC: 130 MG/DL (ref 0–149)
TSH SERPL DL<=0.005 MIU/L-ACNC: 1.76 UIU/ML (ref 0.3–3.7)

## 2017-08-04 PROCEDURE — S5190 WELLNESS ASSESSMENT BY NONPH: HCPCS

## 2017-08-04 PROCEDURE — 80053 COMPREHEN METABOLIC PANEL: CPT

## 2017-08-04 PROCEDURE — 36415 COLL VENOUS BLD VENIPUNCTURE: CPT

## 2017-08-04 PROCEDURE — 80061 LIPID PANEL: CPT

## 2017-08-04 PROCEDURE — 84443 ASSAY THYROID STIM HORMONE: CPT

## 2017-08-04 PROCEDURE — 82947 ASSAY GLUCOSE BLOOD QUANT: CPT

## 2017-08-08 NOTE — PROGRESS NOTES
[facility]  Neurodiagnostic Laboratory  [facility] [place] [id number] [phone]      Patient: HERACLIO DENNISSHANTA Referring MD: TJ  Medical Record: [id number] Reading MD: CAMILO  YOB: 1975 Indication: R20.0  Age: 41 Years 10 Months  Notes: [facility]#[id number]      Sensory NCS      Nerve / Sites Rec. Site Onset Lat Peak Lat NP Amp PP Amp Segments Distance Velocity     ms ms µV µV  cm m/s   L MEDIAN - Dig II-PENG      Wrist Dig II 2.25 2.80 83.5 116.6 Wrist - Dig II 13 57.8   L ULNAR - Dig V -PENG      Wrist Dig V 2.20 2.90 40.2 51.5 Wrist - Dig V 11 50.0   L MEDIAN - Ulnar DigIV-PENG      Median Dig IV 2.25 2.70 30.1 41.4 Median - Dig IV 13 57.8      Ulnar Dig IV 2.25 2.75 31.0 59.6 Ulnar - Dig IV 13 57.8             Motor NCS      Nerve / Sites Rec. Site Lat Amp Rel Amp Segments Dist. Zack     ms mV %  cm m/s   L MEDIAN - APB -PENG      Wrist APB 3.60 9.0 100 Wrist - APB 7       Elbow APB 7.90 8.4 93.7 Elbow - Wrist 25 58.1   L ULNAR - ADM -PENG      Wrist ADM 2.40 8.1 100 Wrist - ADM 7       B.Elbow ADM 7.25 7.0 87.3 B.Elbow - Wrist 27 55.7           EMG Summary Table     Spontaneous MUAP Recruitment    IA Fib PSW Fasc H.F. Amp Dur. PPP Pattern   L. BICEPS N None None None None N N N N   L. FLEX CARPI RAD N None None None None N N N N   L. EXT DIG COMM N None None None None N N N N   L. PRON TERES N None None None None N N N N   L. FLEX CARPI ULN N None None None None N N N N   L. FIRST D INTEROSS N None None None None N N N N       SUMMARY:    Normal NCV EMG    IMPRESSION:    ________________________________________________________________________      Normal NCV EMG    ________________________________________________________________________      There are no signs to support entrapment neuropathy    Nerve conduction studies and EMG studies are useful supportive diagnostic tool but not confirmatory tests in majority of cases. Clinical Correlation is advised

## 2017-08-09 ENCOUNTER — TELEPHONE (OUTPATIENT)
Dept: MEDICAL GROUP | Facility: PHYSICIAN GROUP | Age: 42
End: 2017-08-09

## 2017-08-09 NOTE — TELEPHONE ENCOUNTER
Okay to stop the metoprolol. May take half a tablet a day as needed only when he has palpitations.

## 2017-08-09 NOTE — TELEPHONE ENCOUNTER
Phone Number Called: [phone] (home)       Message: Pt called stating his BP has been getting very low. He has been taking Metoprolol 25 mg 1/2 pill in am adn 1/2 pill in pm. His blood pressure has been 82/50 85/55 and is currently at 100/65.    Left Message for patient to call back: N\A

## 2017-08-17 ENCOUNTER — OFFICE VISIT (OUTPATIENT)
Dept: CARDIOLOGY | Facility: MEDICAL CENTER | Age: 42
End: 2017-08-17
Payer: COMMERCIAL

## 2017-08-17 VITALS
WEIGHT: 168 LBS | OXYGEN SATURATION: 95 % | HEIGHT: 64 IN | BODY MASS INDEX: 28.68 KG/M2 | HEART RATE: 88 BPM | DIASTOLIC BLOOD PRESSURE: 76 MMHG | SYSTOLIC BLOOD PRESSURE: 122 MMHG

## 2017-08-17 DIAGNOSIS — I49.1 PREMATURE ATRIAL COMPLEX: ICD-10-CM

## 2017-08-17 DIAGNOSIS — R00.2 PALPITATIONS: ICD-10-CM

## 2017-08-17 DIAGNOSIS — I49.3 PVC (PREMATURE VENTRICULAR CONTRACTION): ICD-10-CM

## 2017-08-17 DIAGNOSIS — R07.89 ATYPICAL CHEST PAIN: ICD-10-CM

## 2017-08-17 PROCEDURE — 99214 OFFICE O/P EST MOD 30 MIN: CPT | Performed by: INTERNAL MEDICINE

## 2017-08-17 ASSESSMENT — ENCOUNTER SYMPTOMS
DOUBLE VISION: 0
DIZZINESS: 0
BRUISES/BLEEDS EASILY: 0
SPEECH CHANGE: 0
VOMITING: 0
CHILLS: 0
ORTHOPNEA: 0
EYE PAIN: 0
DEPRESSION: 0
CLAUDICATION: 0
BLOOD IN STOOL: 0
PND: 0
EYE DISCHARGE: 0
FALLS: 0
PALPITATIONS: 0
WEIGHT LOSS: 0
COUGH: 0
FEVER: 0
BLURRED VISION: 0
ABDOMINAL PAIN: 0
SENSORY CHANGE: 0
MYALGIAS: 0
HALLUCINATIONS: 0
HEADACHES: 0
LOSS OF CONSCIOUSNESS: 0
NAUSEA: 0
SHORTNESS OF BREATH: 0

## 2017-08-17 NOTE — MR AVS SNAPSHOT
"        Lucas Simmons   2017 2:15 PM   Office Visit   MRN: [id number]    Department:  [facility]    Dept Phone:  [phone]    Description:  Male : 1975   Provider:  Korina Dacosta M.D.           Reason for Visit     Follow-Up           Allergies as of 2017     No Known Allergies      You were diagnosed with     PVC (premature ventricular contraction)   [619689]       Premature atrial complex   [189309]       Atypical chest pain   [426985]       Palpitations   [785.1.ICD-9-CM]         Vital Signs     Blood Pressure Pulse Height Weight Body Mass Index Oxygen Saturation    122/76 mmHg 88 1.626 m (5' 4.02\") 76.204 kg (168 lb) 28.82 kg/m2 95%    Smoking Status                   Never Smoker            Basic Information     Date Of Birth Sex Race Ethnicity Preferred Language    1975 Male  Non- English      Your appointments     Oct 26, 2017 10:40 AM   Established Patient with Jenny Frye M.D.   [facility] (--)    [address]  [address]  [facility] [id number] [phone]           You will be receiving a confirmation call a few days before your appointment from our automated call confirmation system.              Problem List              ICD-10-CM Priority Class Noted - Resolved    PVC (premature ventricular contraction) I49.3   2017 - Present    Premature atrial complex I49.1   2017 - Present    Atypical chest pain R07.89   2017 - Present    Palpitations R00.2   2017 - Present      Health Maintenance        Date Due Completion Dates    IMM DTaP/Tdap/Td Vaccine (1 - Tdap) 1993 ---    IMM INFLUENZA (1) 2017            Current Immunizations     Influenza Vaccine Quad Inj (Pf) 2016  2:01 PM      Below and/or attached are the medications your provider expects you to take. Review all of your home medications and newly ordered medications with your provider and/or pharmacist. Follow medication instructions as " directed by your provider and/or pharmacist. Please keep your medication list with you and share with your provider. Update the information when medications are discontinued, doses are changed, or new medications (including over-the-counter products) are added; and carry medication information at all times in the event of emergency situations     Allergies:  No Known Allergies          Medications  Valid as of: August 17, 2017 -  2:21 PM    Generic Name Brand Name Tablet Size Instructions for use    DiazePAM (Tab) VALIUM 5 MG Take 1 Tab by mouth every 12 hours as needed for Anxiety.        Metoprolol Tartrate (Tab) LOPRESSOR 25 MG Take 1 Tab by mouth every 12 hours for 30 days.        Multiple Vitamin   Take  by mouth.        .                 Medicines prescribed today were sent to:     [id number] - [place], [place] - [address]    [address] [place] [address]    Phone: [phone] Fax: [phone]    Open 24 Hours?: No      Medication refill instructions:       If your prescription bottle indicates you have medication refills left, it is not necessary to call your provider’s office. Please contact your pharmacy and they will refill your medication.    If your prescription bottle indicates you do not have any refills left, you may request refills at any time through one of the following ways: The online [employer] system (except Urgent Care), by calling your provider’s office, or by asking your pharmacy to contact your provider’s office with a refill request. Medication refills are processed only during regular business hours and may not be available until the next business day. Your provider may request additional information or to have a follow-up visit with you prior to refilling your medication.   *Please Note: Medication refills are assigned a new Rx number when refilled electronically. Your pharmacy may indicate that no refills were authorized even though a new prescription for the same  medication is available at the pharmacy. Please request the medicine by name with the pharmacy before contacting your provider for a refill.        Your To Do List     Future Labs/Procedures Complete By Expires    COMP METABOLIC PANEL  As directed 8/18/2018         [employer] Access Code: Activation code not generated  Current [employer] Status: Active

## 2017-08-17 NOTE — PROGRESS NOTES
Subjective:   Lucas Simmons is a 41 y.o. male who presents today for cardiac care of PAC, PVC. He has had multiple ER visits for palpitations. No chest pain.     Holter in the past showed evidence of PAC and PVC. Had a normal echocardiogram stress test.        Past Medical History   Diagnosis Date   • Arrhythmia      History reviewed. No pertinent past surgical history.  Family History   Problem Relation Age of Onset   • Hypertension Mother    • Cancer Father      liver cancer    • Alcohol/Drug Father    • Arthritis Sister    • Arthritis Brother    • Arthritis Brother    • Arthritis Brother      History   Smoking status   • Never Smoker    Smokeless tobacco   • Never Used     No Known Allergies  Outpatient Encounter Prescriptions as of 8/17/2017   Medication Sig Dispense Refill   • metoprolol (LOPRESSOR) 25 MG Tab Take 1 Tab by mouth every 12 hours for 30 days. 60 Tab 3   • diazepam (VALIUM) 5 MG Tab Take 1 Tab by mouth every 12 hours as needed for Anxiety. 30 Tab 0   • Multiple Vitamin (MULTI VITAMIN DAILY PO) Take  by mouth.       No facility-administered encounter medications on file as of 8/17/2017.     Review of Systems   Constitutional: Negative for fever, chills, weight loss and malaise/fatigue.   HENT: Negative for ear discharge, ear pain, hearing loss and nosebleeds.    Eyes: Negative for blurred vision, double vision, pain and discharge.   Respiratory: Negative for cough and shortness of breath.    Cardiovascular: Negative for chest pain, palpitations, orthopnea, claudication, leg swelling and PND.   Gastrointestinal: Negative for nausea, vomiting, abdominal pain, blood in stool and melena.   Genitourinary: Negative for dysuria and hematuria.   Musculoskeletal: Negative for myalgias, joint pain and falls.   Skin: Negative for itching and rash.   Neurological: Negative for dizziness, sensory change, speech change, loss of consciousness and headaches.   Endo/Heme/Allergies: Negative for environmental  "allergies. Does not bruise/bleed easily.   Psychiatric/Behavioral: Negative for depression, suicidal ideas and hallucinations.        Objective:   /76 mmHg  Pulse 88  Ht 1.626 m (5' 4.02\")  Wt 76.204 kg (168 lb)  BMI 28.82 kg/m2  SpO2 95%    Physical Exam   Constitutional: He is oriented to person, place, and time. He appears well-developed and well-nourished.   HENT:   Head: Normocephalic and atraumatic.   Eyes: EOM are normal.   Neck: Normal range of motion. No JVD present.   Cardiovascular: Normal rate, regular rhythm, normal heart sounds and intact distal pulses.  Exam reveals no gallop and no friction rub.    No murmur heard.  Bilateral femoral pulses are 2+, bilateral dorsalis pedis pulses are 2+, bilateral posterior tibialis pulses are 2+.   Pulmonary/Chest: No respiratory distress. He has no wheezes. He has no rales. He exhibits no tenderness.   Abdominal: Soft. Bowel sounds are normal. There is no tenderness. There is no rebound and no guarding.   The is no presence of abdominal bruits   Musculoskeletal: Normal range of motion.   Neurological: He is alert and oriented to person, place, and time.   Skin: Skin is warm and dry.   Psychiatric: He has a normal mood and affect.   Vitals reviewed.      Assessment:     1. PVC (premature ventricular contraction)  COMP METABOLIC PANEL    LIPID PANEL   2. Premature atrial complex  COMP METABOLIC PANEL    LIPID PANEL   3. Atypical chest pain  COMP METABOLIC PANEL    LIPID PANEL   4. Palpitations  COMP METABOLIC PANEL    LIPID PANEL       Medical Decision Making:  Today's Assessment / Status / Plan:     At this time patient is clinically stable in terms of his cardiac standpoint.  Blood pressure is well controlled.  Cont current medications at current dose.     I will see patient back in clinic with lab tests and studies results in 12 months.    I thank you Dr. Frye for referring patient to our Cardiology Clinic today.   "

## 2017-08-17 NOTE — Clinical Note
[facility]   [address]  [place] [address]  Phone: [phone]  Fax: [phone]              Lucas Simmons  1975    Encounter Date: 8/17/2017    Korina Dacosta M.D.          PROGRESS NOTE:  Subjective:   Lucas Simmons is a 41 y.o. male who presents today for cardiac care of PAC, PVC. He has had multiple ER visits for palpitations. No chest pain.     Holter in the past showed evidence of PAC and PVC. Had a normal echocardiogram stress test.        Past Medical History   Diagnosis Date   • Arrhythmia      History reviewed. No pertinent past surgical history.  Family History   Problem Relation Age of Onset   • Hypertension Mother    • Cancer Father      liver cancer    • Alcohol/Drug Father    • Arthritis Sister    • Arthritis Brother    • Arthritis Brother    • Arthritis Brother      History   Smoking status   • Never Smoker    Smokeless tobacco   • Never Used     No Known Allergies  Outpatient Encounter Prescriptions as of 8/17/2017   Medication Sig Dispense Refill   • metoprolol (LOPRESSOR) 25 MG Tab Take 1 Tab by mouth every 12 hours for 30 days. 60 Tab 3   • diazepam (VALIUM) 5 MG Tab Take 1 Tab by mouth every 12 hours as needed for Anxiety. 30 Tab 0   • Multiple Vitamin (MULTI VITAMIN DAILY PO) Take  by mouth.       No facility-administered encounter medications on file as of 8/17/2017.     Review of Systems   Constitutional: Negative for fever, chills, weight loss and malaise/fatigue.   HENT: Negative for ear discharge, ear pain, hearing loss and nosebleeds.    Eyes: Negative for blurred vision, double vision, pain and discharge.   Respiratory: Negative for cough and shortness of breath.    Cardiovascular: Negative for chest pain, palpitations, orthopnea, claudication, leg swelling and PND.   Gastrointestinal: Negative for nausea, vomiting, abdominal pain, blood in stool and melena.   Genitourinary: Negative for dysuria and hematuria.    "  Musculoskeletal: Negative for myalgias, joint pain and falls.   Skin: Negative for itching and rash.   Neurological: Negative for dizziness, sensory change, speech change, loss of consciousness and headaches.   Endo/Heme/Allergies: Negative for environmental allergies. Does not bruise/bleed easily.   Psychiatric/Behavioral: Negative for depression, suicidal ideas and hallucinations.        Objective:   /76 mmHg  Pulse 88  Ht 1.626 m (5' 4.02\")  Wt 76.204 kg (168 lb)  BMI 28.82 kg/m2  SpO2 95%    Physical Exam   Constitutional: He is oriented to person, place, and time. He appears well-developed and well-nourished.   HENT:   Head: Normocephalic and atraumatic.   Eyes: EOM are normal.   Neck: Normal range of motion. No JVD present.   Cardiovascular: Normal rate, regular rhythm, normal heart sounds and intact distal pulses.  Exam reveals no gallop and no friction rub.    No murmur heard.  Bilateral femoral pulses are 2+, bilateral dorsalis pedis pulses are 2+, bilateral posterior tibialis pulses are 2+.   Pulmonary/Chest: No respiratory distress. He has no wheezes. He has no rales. He exhibits no tenderness.   Abdominal: Soft. Bowel sounds are normal. There is no tenderness. There is no rebound and no guarding.   The is no presence of abdominal bruits   Musculoskeletal: Normal range of motion.   Neurological: He is alert and oriented to person, place, and time.   Skin: Skin is warm and dry.   Psychiatric: He has a normal mood and affect.   Vitals reviewed.      Assessment:     1. PVC (premature ventricular contraction)  COMP METABOLIC PANEL    LIPID PANEL   2. Premature atrial complex  COMP METABOLIC PANEL    LIPID PANEL   3. Atypical chest pain  COMP METABOLIC PANEL    LIPID PANEL   4. Palpitations  COMP METABOLIC PANEL    LIPID PANEL       Medical Decision Making:  Today's Assessment / Status / Plan:     At this time patient is clinically stable in terms of his cardiac standpoint.  Blood pressure is well " controlled.  Cont current medications at current dose.     I will see patient back in clinic with lab tests and studies results in 12 months.    I thank you Dr. Frye for referring patient to our Cardiology Clinic today.       Jenny Frye M.D.  [address]   [place] [address]  VIA In Basket

## 2017-09-12 ENCOUNTER — HOSPITAL ENCOUNTER (OUTPATIENT)
Facility: MEDICAL CENTER | Age: 42
End: 2017-09-12
Attending: PREVENTIVE MEDICINE
Payer: COMMERCIAL

## 2017-09-12 ENCOUNTER — EH NON-PROVIDER (OUTPATIENT)
Dept: OCCUPATIONAL MEDICINE | Facility: CLINIC | Age: 42
End: 2017-09-12

## 2017-09-12 DIAGNOSIS — Z29.89 NEED FOR ISOLATION: ICD-10-CM

## 2017-09-12 DIAGNOSIS — Z02.89 ENCOUNTER FOR OCCUPATIONAL HEALTH EXAMINATION: ICD-10-CM

## 2017-09-12 PROCEDURE — 86480 TB TEST CELL IMMUN MEASURE: CPT | Performed by: PREVENTIVE MEDICINE

## 2017-09-14 LAB
M TB TUBERC IFN-G BLD QL: POSITIVE
M TB TUBERC IFN-G/MITOGEN IGNF BLD: 0.97
M TB TUBERC IGNF/MITOGEN IGNF CONTROL: 50.57 [IU]/ML
MITOGEN IGNF BCKGRD COR BLD-ACNC: 1.54 [IU]/ML

## 2017-09-28 ENCOUNTER — EH NON-PROVIDER (OUTPATIENT)
Dept: OCCUPATIONAL MEDICINE | Facility: CLINIC | Age: 42
End: 2017-09-28

## 2017-09-28 DIAGNOSIS — Z29.89 NEED FOR ISOLATION: ICD-10-CM

## 2017-09-28 PROCEDURE — 94375 RESPIRATORY FLOW VOLUME LOOP: CPT

## 2017-10-12 ENCOUNTER — APPOINTMENT (OUTPATIENT)
Dept: SOCIAL WORK | Facility: CLINIC | Age: 42
End: 2017-10-12

## 2017-10-23 ENCOUNTER — TELEPHONE (OUTPATIENT)
Dept: OCCUPATIONAL MEDICINE | Facility: CLINIC | Age: 42
End: 2017-10-23

## 2017-10-23 NOTE — TELEPHONE ENCOUNTER
Phone Number Called: [phone] (home)     Message: +Quanitiferon. Second collection needed. Able to leave a detailed VM for patient. Told patient that he needed to come back tomorrow for the second draw.    Left Message for patient to call back: yes

## 2017-10-24 ENCOUNTER — EH NON-PROVIDER (OUTPATIENT)
Dept: OCCUPATIONAL MEDICINE | Facility: CLINIC | Age: 42
End: 2017-10-24

## 2017-10-24 ENCOUNTER — HOSPITAL ENCOUNTER (OUTPATIENT)
Facility: MEDICAL CENTER | Age: 42
End: 2017-10-24
Attending: PREVENTIVE MEDICINE
Payer: COMMERCIAL

## 2017-10-24 DIAGNOSIS — R76.12 POSITIVE QUANTIFERON-TB GOLD TEST: ICD-10-CM

## 2017-10-24 PROCEDURE — 86480 TB TEST CELL IMMUN MEASURE: CPT | Performed by: PREVENTIVE MEDICINE

## 2017-10-25 LAB
M TB TUBERC IFN-G BLD QL: NEGATIVE
M TB TUBERC IFN-G/MITOGEN IGNF BLD: -0.01
M TB TUBERC IGNF/MITOGEN IGNF CONTROL: 66.59 [IU]/ML
MITOGEN IGNF BCKGRD COR BLD-ACNC: 0.1 [IU]/ML

## 2017-10-26 ENCOUNTER — OFFICE VISIT (OUTPATIENT)
Dept: MEDICAL GROUP | Facility: PHYSICIAN GROUP | Age: 42
End: 2017-10-26
Payer: COMMERCIAL

## 2017-10-26 DIAGNOSIS — R07.89 ATYPICAL CHEST PAIN: ICD-10-CM

## 2017-10-26 DIAGNOSIS — Z56.6 STRESS AT WORK: ICD-10-CM

## 2017-10-26 DIAGNOSIS — R00.2 PALPITATIONS: ICD-10-CM

## 2017-10-26 PROCEDURE — 99214 OFFICE O/P EST MOD 30 MIN: CPT | Performed by: FAMILY MEDICINE

## 2017-10-26 RX ORDER — LANOLIN ALCOHOL/MO/W.PET/CERES
3 CREAM (GRAM) TOPICAL
COMMUNITY
End: 2018-01-17

## 2017-10-26 SDOH — HEALTH STABILITY - MENTAL HEALTH: OTHER PHYSICAL AND MENTAL STRAIN RELATED TO WORK: Z56.6

## 2017-10-27 VITALS
SYSTOLIC BLOOD PRESSURE: 90 MMHG | WEIGHT: 169.09 LBS | BODY MASS INDEX: 28.87 KG/M2 | HEIGHT: 64 IN | DIASTOLIC BLOOD PRESSURE: 70 MMHG | HEART RATE: 82 BPM | OXYGEN SATURATION: 95 % | TEMPERATURE: 97.6 F

## 2017-10-27 NOTE — PROGRESS NOTES
"Subjective:      Lucas Simmons is a 42 y.o. male who presents with Follow-Up            HPI     Patient returns for follow-up.    In terms of his atypical chest pain and palpitations, workup done by the cardiologist came back stress echo was negative for ischemia with normal exercise tolerance with no arrhythmia 8 there is stress or rest, normal systolic function. Holter monitor consistent with PACs and PVCs. He said he has only taken metoprolol 25 mg half tablet one dose since his last visit with me in July. Chest pain is located in the epigastric region which is noted when he is stressed at work. 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 He said he has to get everything done before the end of his shift which gives him a lot of stress. He said he is the type of person who wants to get all things done so he doesn't pass the word to the next shift. At the end of his shift he worries that he did not get the work done right and so he still gets stressed at home. He thinks this may be causing his chest pain and palpitations.    He said he and his family tries to spend time outdoors to relieve the stress.    Past medical history, past surgical history, family history reviewed-no changes    Social history reviewed-no changes    Allergies reviewed-no changes    Medications reviewed-no changes    ROS     Review of systems as per history of present illness, the rest are negative.       Objective:     BP (!) 80/60   Pulse 82   Temp 36.4 °C (97.6 °F)   Ht 1.626 m (5' 4\")   Wt 76.7 kg (169 lb 1.5 oz)   SpO2 95%   BMI 29.02 kg/m²      Physical Exam     Examined alert, awake, oriented, not in distress    Neck-supple, no lymphadenopathy, no thyromegaly  Lungs-clear to auscultation, no rales, no wheezes  Heart-regular rate and rhythm, no arrhythmia appreciated, no murmur  Extremities-no edema, clubbing, cyanosis         Assessment/Plan:     1. Palpitations  Due to PACs and PVCs. Symptoms " are associated with stress. Counseling done with regards to management of stress. He can continue metoprolol as needed although he has to be careful because his blood pressure is already running on the lower end of normal. He will have to check his blood pressure closely when he takes the medication.    2. Atypical chest pain  No evidence of ischemia on stress echo. Most likely related to stress. Counseling done.    3. Stress at work  Counseling done on this visit. Offered referral for counseling. Also offered medication. Patient declines to proceed with both at this time. He said he will let me know if he changes his mind in the future.      Please note that this dictation was created using voice recognition software. I have worked with consultants from the vendor as well as technical experts from [employer] to optimize the interface. I have made every reasonable attempt to correct obvious errors, but I expect that there are errors of grammar and possibly content I did not discover before finalizing the note.

## 2017-12-04 ENCOUNTER — TELEPHONE (OUTPATIENT)
Dept: MEDICAL GROUP | Facility: PHYSICIAN GROUP | Age: 42
End: 2017-12-04

## 2017-12-04 NOTE — TELEPHONE ENCOUNTER
Patient called and s still having pain along with heartburn want to know if its ok to take Ibuprofen along with prilosec, Or any suggestions

## 2017-12-07 ENCOUNTER — OFFICE VISIT (OUTPATIENT)
Dept: MEDICAL GROUP | Facility: PHYSICIAN GROUP | Age: 42
End: 2017-12-07
Payer: COMMERCIAL

## 2017-12-07 VITALS
HEIGHT: 64 IN | TEMPERATURE: 97.5 F | HEART RATE: 75 BPM | WEIGHT: 174.16 LBS | OXYGEN SATURATION: 95 % | SYSTOLIC BLOOD PRESSURE: 90 MMHG | DIASTOLIC BLOOD PRESSURE: 60 MMHG | BODY MASS INDEX: 29.73 KG/M2

## 2017-12-07 DIAGNOSIS — R10.9 LEFT FLANK PAIN: ICD-10-CM

## 2017-12-07 DIAGNOSIS — Z56.6 STRESS AT WORK: ICD-10-CM

## 2017-12-07 DIAGNOSIS — R10.13 EPIGASTRIC PAIN: ICD-10-CM

## 2017-12-07 DIAGNOSIS — G47.00 INSOMNIA, UNSPECIFIED TYPE: ICD-10-CM

## 2017-12-07 LAB
APPEARANCE UR: CLEAR
BILIRUB UR STRIP-MCNC: NORMAL MG/DL
COLOR UR AUTO: NORMAL
GLUCOSE UR STRIP.AUTO-MCNC: NORMAL MG/DL
KETONES UR STRIP.AUTO-MCNC: NORMAL MG/DL
LEUKOCYTE ESTERASE UR QL STRIP.AUTO: NORMAL
NITRITE UR QL STRIP.AUTO: NORMAL
PH UR STRIP.AUTO: 5.5 [PH] (ref 5–8)
PROT UR QL STRIP: NORMAL MG/DL
RBC UR QL AUTO: NORMAL
SP GR UR STRIP.AUTO: 1.01
UROBILINOGEN UR STRIP-MCNC: 0.2 MG/DL

## 2017-12-07 PROCEDURE — 99214 OFFICE O/P EST MOD 30 MIN: CPT | Performed by: FAMILY MEDICINE

## 2017-12-07 PROCEDURE — 81002 URINALYSIS NONAUTO W/O SCOPE: CPT | Performed by: FAMILY MEDICINE

## 2017-12-07 SDOH — HEALTH STABILITY - MENTAL HEALTH: OTHER PHYSICAL AND MENTAL STRAIN RELATED TO WORK: Z56.6

## 2017-12-08 NOTE — PROGRESS NOTES
"Subjective:      Lucas Simmons is a 42 y.o. male who presents with Follow-Up            HPI     Patient is here because of problems with insomnia. This has been ongoing for the last 2 weeks. He has problem going to sleep. He has tried melatonin, Benadryl, Unisom. With these medications he is able to go to sleep but he only stays asleep for one hour and when he wakes up and he is not able to go back to sleep. He still has Valium 5 mg prescribed at urgent care back in 7/17 and he tried to take half a tablet and it worked very well for him. He does not want to take this every night because he knows this is habit forming and addicting medication. He is asking if there is any other option we can do. He bought Tylenol PM which he has not tried yet.    He also has been complaining of heartburn and epigastric pain. He asked if he could take over-the-counter omeprazole. He also asked if he could take ibuprofen. We advised him over the phone not to take ibuprofen because this would worsen gastritis or peptic ulcer disease. He has been taking omeprazole as needed with good results. He has been avoiding all caffeinated drinks.    He complains of pain in the left flank area on and off for a while now. He denies any problems with urination or bowel movement.    He suffers from stress due to his work. He said he has been able to manage the stress better but he is still unable to deal with the problems with insomnia. He wants to try acupuncture to manage distress. He would like a referral.    Past medical history, past surgical history, family history reviewed-no changes    Social history reviewed-no changes    Allergies reviewed-no changes    Medications reviewed-no changes     ROS     Review of systems as per history of present illness, the rest are negative.       Objective:     BP (!) 90/60   Pulse 75   Temp 36.4 °C (97.5 °F)   Ht 1.626 m (5' 4\")   Wt 79 kg (174 lb 2.6 oz)   SpO2 95%   BMI 29.90 kg/m²      Physical Exam "     Examined alert, awake, oriented, not in distress    Neck-supple, no lymphadenopathy, no thyromegaly  Lungs-clear to auscultation, no rales, no wheezes  Heart-regular rate and rhythm, no murmur  Abdomen-bowel sounds, soft, nontender, no hepatosplenomegaly, no masses, no CVA tenderness  Extremities-no edema, clubbing, cyanosis       Urine dipstick-within normal limits     Assessment/Plan:     1. Insomnia, unspecified type  Advised patient to try Tylenol PM one tablet at night. If this works he can stay on this every night without any problems for potential for abuse, dependence and tolerance. If this does not work he will let me know so we can try trazodone.    2. Epigastric pain  Continue omeprazole on an as-needed basis. Continue to avoid caffeine.    3. Left flank pain  We did a urine dipstick which all came back within normal limits. He could have a musculoskeletal pain probably muscle strain. Advised warm compresses and Tylenol as needed. Avoid NSAIDs because of the epigastric pain.  - POCT Urinalysis    4. Stress at work  Referral placed acupuncture.  - REFERRAL FOR ACUPUNCTURE      Please note that this dictation was created using voice recognition software. I have worked with consultants from the vendor as well as technical experts from [employer]  [employer] to optimize the interface. I have made every reasonable attempt to correct obvious errors, but I expect that there are errors of grammar and possibly content I did not discover before finalizing the note.

## 2017-12-12 ENCOUNTER — TELEPHONE (OUTPATIENT)
Dept: MEDICAL GROUP | Facility: PHYSICIAN GROUP | Age: 42
End: 2017-12-12

## 2017-12-13 ENCOUNTER — APPOINTMENT (OUTPATIENT)
Dept: RADIOLOGY | Facility: MEDICAL CENTER | Age: 42
End: 2017-12-13
Attending: EMERGENCY MEDICINE
Payer: COMMERCIAL

## 2017-12-13 ENCOUNTER — HOSPITAL ENCOUNTER (EMERGENCY)
Facility: MEDICAL CENTER | Age: 42
End: 2017-12-14
Attending: EMERGENCY MEDICINE
Payer: COMMERCIAL

## 2017-12-13 DIAGNOSIS — I95.89 OTHER SPECIFIED HYPOTENSION: ICD-10-CM

## 2017-12-13 DIAGNOSIS — G47.09 OTHER INSOMNIA: ICD-10-CM

## 2017-12-13 LAB
ALBUMIN SERPL BCP-MCNC: 4.2 G/DL (ref 3.2–4.9)
ALBUMIN/GLOB SERPL: 1.3 G/DL
ALP SERPL-CCNC: 79 U/L (ref 30–99)
ALT SERPL-CCNC: 48 U/L (ref 2–50)
ANION GAP SERPL CALC-SCNC: 8 MMOL/L (ref 0–11.9)
APPEARANCE UR: CLEAR
AST SERPL-CCNC: 37 U/L (ref 12–45)
BASOPHILS # BLD AUTO: 0.6 % (ref 0–1.8)
BASOPHILS # BLD: 0.05 K/UL (ref 0–0.12)
BILIRUB SERPL-MCNC: 0.4 MG/DL (ref 0.1–1.5)
BILIRUB UR QL STRIP.AUTO: NEGATIVE
BUN SERPL-MCNC: 14 MG/DL (ref 8–22)
CALCIUM SERPL-MCNC: 9.1 MG/DL (ref 8.4–10.2)
CHLORIDE SERPL-SCNC: 102 MMOL/L (ref 96–112)
CO2 SERPL-SCNC: 27 MMOL/L (ref 20–33)
COLOR UR: YELLOW
CREAT SERPL-MCNC: 1.06 MG/DL (ref 0.5–1.4)
EOSINOPHIL # BLD AUTO: 0.2 K/UL (ref 0–0.51)
EOSINOPHIL NFR BLD: 2.4 % (ref 0–6.9)
ERYTHROCYTE [DISTWIDTH] IN BLOOD BY AUTOMATED COUNT: 37.8 FL (ref 35.9–50)
FLUAV+FLUBV AG SPEC QL IA: NORMAL
GFR SERPL CREATININE-BSD FRML MDRD: >60 ML/MIN/1.73 M 2
GLOBULIN SER CALC-MCNC: 3.3 G/DL (ref 1.9–3.5)
GLUCOSE BLD-MCNC: 99 MG/DL (ref 65–99)
GLUCOSE SERPL-MCNC: 96 MG/DL (ref 65–99)
GLUCOSE UR STRIP.AUTO-MCNC: NEGATIVE MG/DL
HCT VFR BLD AUTO: 42.2 % (ref 42–52)
HGB BLD-MCNC: 14.9 G/DL (ref 14–18)
IMM GRANULOCYTES # BLD AUTO: 0.04 K/UL (ref 0–0.11)
IMM GRANULOCYTES NFR BLD AUTO: 0.5 % (ref 0–0.9)
KETONES UR STRIP.AUTO-MCNC: NEGATIVE MG/DL
LACTATE BLD-SCNC: 1.57 MMOL/L (ref 0.5–2)
LACTATE BLD-SCNC: 1.71 MMOL/L (ref 0.5–2)
LEUKOCYTE ESTERASE UR QL STRIP.AUTO: NEGATIVE
LYMPHOCYTES # BLD AUTO: 2.3 K/UL (ref 1–4.8)
LYMPHOCYTES NFR BLD: 27.4 % (ref 22–41)
MCH RBC QN AUTO: 32 PG (ref 27–33)
MCHC RBC AUTO-ENTMCNC: 35.3 G/DL (ref 33.7–35.3)
MCV RBC AUTO: 90.8 FL (ref 81.4–97.8)
MICRO URNS: NORMAL
MONOCYTES # BLD AUTO: 0.68 K/UL (ref 0–0.85)
MONOCYTES NFR BLD AUTO: 8.1 % (ref 0–13.4)
NEUTROPHILS # BLD AUTO: 5.13 K/UL (ref 1.82–7.42)
NEUTROPHILS NFR BLD: 61 % (ref 44–72)
NITRITE UR QL STRIP.AUTO: NEGATIVE
NRBC # BLD AUTO: 0 K/UL
NRBC BLD AUTO-RTO: 0 /100 WBC
PH UR STRIP.AUTO: 5.5 [PH]
PLATELET # BLD AUTO: 226 K/UL (ref 164–446)
PMV BLD AUTO: 9 FL (ref 9–12.9)
POTASSIUM SERPL-SCNC: 3.7 MMOL/L (ref 3.6–5.5)
PROT SERPL-MCNC: 7.5 G/DL (ref 6–8.2)
PROT UR QL STRIP: NEGATIVE MG/DL
RBC # BLD AUTO: 4.65 M/UL (ref 4.7–6.1)
RBC UR QL AUTO: NEGATIVE
SIGNIFICANT IND 70042: NORMAL
SITE SITE: NORMAL
SODIUM SERPL-SCNC: 137 MMOL/L (ref 135–145)
SOURCE SOURCE: NORMAL
SP GR UR STRIP.AUTO: <=1.005
SPECIMEN DRAWN FROM PATIENT: NORMAL
SPECIMEN DRAWN FROM PATIENT: NORMAL
TROPONIN I SERPL-MCNC: <0.02 NG/ML (ref 0–0.04)
WBC # BLD AUTO: 8.4 K/UL (ref 4.8–10.8)

## 2017-12-13 PROCEDURE — 93005 ELECTROCARDIOGRAM TRACING: CPT | Performed by: EMERGENCY MEDICINE

## 2017-12-13 PROCEDURE — 87040 BLOOD CULTURE FOR BACTERIA: CPT

## 2017-12-13 PROCEDURE — 71010 DX-CHEST-PORTABLE (1 VIEW): CPT

## 2017-12-13 PROCEDURE — 99284 EMERGENCY DEPT VISIT MOD MDM: CPT

## 2017-12-13 PROCEDURE — 83605 ASSAY OF LACTIC ACID: CPT

## 2017-12-13 PROCEDURE — 81003 URINALYSIS AUTO W/O SCOPE: CPT

## 2017-12-13 PROCEDURE — 36415 COLL VENOUS BLD VENIPUNCTURE: CPT

## 2017-12-13 PROCEDURE — 85025 COMPLETE CBC W/AUTO DIFF WBC: CPT

## 2017-12-13 PROCEDURE — 87400 INFLUENZA A/B EACH AG IA: CPT

## 2017-12-13 PROCEDURE — 80053 COMPREHEN METABOLIC PANEL: CPT

## 2017-12-13 PROCEDURE — 84484 ASSAY OF TROPONIN QUANT: CPT

## 2017-12-13 PROCEDURE — 700105 HCHG RX REV CODE 258: Performed by: EMERGENCY MEDICINE

## 2017-12-13 PROCEDURE — 87086 URINE CULTURE/COLONY COUNT: CPT

## 2017-12-13 PROCEDURE — 82962 GLUCOSE BLOOD TEST: CPT

## 2017-12-13 RX ORDER — SODIUM CHLORIDE 9 MG/ML
30 INJECTION, SOLUTION INTRAVENOUS ONCE
Status: COMPLETED | OUTPATIENT
Start: 2017-12-13 | End: 2017-12-13

## 2017-12-13 RX ADMIN — SODIUM CHLORIDE 2373 ML: 9 INJECTION, SOLUTION INTRAVENOUS at 21:50

## 2017-12-13 ASSESSMENT — PAIN SCALES - GENERAL
PAINLEVEL_OUTOF10: 0
PAINLEVEL_OUTOF10: 2

## 2017-12-14 VITALS
WEIGHT: 174.38 LBS | HEIGHT: 64 IN | OXYGEN SATURATION: 99 % | TEMPERATURE: 98.1 F | RESPIRATION RATE: 22 BRPM | BODY MASS INDEX: 29.77 KG/M2 | HEART RATE: 75 BPM | SYSTOLIC BLOOD PRESSURE: 100 MMHG | DIASTOLIC BLOOD PRESSURE: 63 MMHG

## 2017-12-14 PROCEDURE — 99284 EMERGENCY DEPT VISIT MOD MDM: CPT

## 2017-12-14 RX ORDER — ZOLPIDEM TARTRATE 5 MG/1
5 TABLET ORAL NIGHTLY PRN
Qty: 5 TAB | Refills: 0 | Status: SHIPPED | OUTPATIENT
Start: 2017-12-14 | End: 2017-12-14

## 2017-12-14 NOTE — ED NOTES
Pt resting in gurney with IV fluids infusing. Denies change since arrival, will continue to monitor.

## 2017-12-14 NOTE — ED NOTES
IV removed, discharge instructions given. Educated on when to return to ed and follow up with PCP. Pt verbalized understanding.

## 2017-12-14 NOTE — ED PROVIDER NOTES
ED Provider Note    CHIEF COMPLAINT  Chief Complaint   Patient presents with   • Lightheadedness       HPI  Lucas Simmons is a 42 y.o. male who presents complaining of lightheadedness. The patient knows his blood pressures below. However, he describes it being pretty much the same as usual, running in the 80s to 90s systolic. I looked through his clinic notes, and in fact he is right, he has pressures recorded of 80 systolic as well as 90 systolic with his last 2 visits. However, he has not felt symptomatic from that before. He has been feeling dizzy. He is having a hard time sleeping, and does not know if that is contributing. He had a cardiac workup in January of this year which was unrevealing apparently. He denies any change in bowel or bladder. His been eating and drinking enough he thinks. There is no belly pain or chest pain. No shortness of breath. He also said difficulty sleeping. He does work on shift. Schedules. He finds it difficult to have a full night's sleep. He has tried some Tylenol PM which has helped at times. There is no other complaint.    PAST MEDICAL HISTORY  Past Medical History:   Diagnosis Date   • Arrhythmia        FAMILY HISTORY  Family History   Problem Relation Age of Onset   • Hypertension Mother    • Cancer Father      liver cancer    • Alcohol/Drug Father    • Arthritis Sister    • Arthritis Brother    • Arthritis Brother    • Arthritis Brother        SOCIAL HISTORY  Social History   Substance Use Topics   • Smoking status: Never Smoker   • Smokeless tobacco: Never Used   • Alcohol use 0.0 oz/week      Comment: occasional     He works here in the lab    SURGICAL HISTORY  No past surgical history on file.    CURRENT MEDICATIONS  Home Medications    **Home medications have not yet been reviewed for this encounter**         I have reviewed the nurses notes and/or the list brought with the patient.    ALLERGIES  No Known Allergies    REVIEW OF SYSTEMS  See HPI for further details.  "Review of systems as above, otherwise all other systems are negative.     PHYSICAL EXAM  VITAL SIGNS: BP (!) 97/64   Pulse 74   Temp 36.7 °C (98.1 °F)   Resp 17   Ht 1.626 m (5' 4\")   Wt 79.1 kg (174 lb 6.1 oz)   SpO2 96%   BMI 29.93 kg/m²   Constitutional: Well appearing patient in no acute distress.  Not toxic, nor ill in appearance.  HENT: Mucus membranes moist.  Oropharynx is clear.  Eyes: Pupils equally round.  No scleral icterus.   Neck: Full nontender range of motion.  Lymphatic: No cervical lymphadenopathy noted.   Cardiovascular: Regular heart rate and rhythm.  No murmurs, rubs, nor gallop appreciated.   Thorax & Lungs: Chest is nontender.  Lungs are clear to auscultation with good air movement bilaterally.  No wheeze, rhonchi, nor rales.   Abdomen: Soft, with no tenderness, rebound nor guarding.  No mass, pulsatile mass, nor hepatosplenomegaly appreciated.  Skin: No purpura nor petechia noted.  Extremities/Musculoskeletal: No sign of trauma.  Calves are nontender with no cords nor edema.  No Soni's sign.  Pulses are intact all around.   Neurologic: Alert & oriented.  Strength and sensation is intact all around.  Gait is normal.  Psychiatric: Normal affect appropriate for the clinical situation.    EKG  I interpreted this EKG myself.  This is a 12-lead study.  The rhythm is sinus with a rate of 67.  There are no ST segment nor T wave abnormalities.  Interpretation: No ST segment elevation myocardial infarction.    LABS  Labs Reviewed   CBC WITH DIFFERENTIAL - Abnormal; Notable for the following:        Result Value    RBC 4.65 (*)     All other components within normal limits   LACTIC ACID   LACTIC ACID   COMP METABOLIC PANEL   URINALYSIS    Narrative:     Indication for culture:->Emergency Room Patient   URINE CULTURE(NEW)    Narrative:     Indication for culture:->Emergency Room Patient   BLOOD CULTURE    Narrative:     Per Hospital Policy: Only change Specimen Src: to \"Line\" if  specified by " "physician order.   BLOOD CULTURE    Narrative:     Per Hospital Policy: Only change Specimen Src: to \"Line\" if  specified by physician order.   TROPONIN   INFLUENZA RAPID   ESTIMATED GFR   ACCU-CHEK GLUCOSE         RADIOLOGY/PROCEDURES  I have reviewed the patient's film interpretations myself, and they are read out by the radiologist as:   DX-CHEST-PORTABLE (1 VIEW)   Final Result         1.  No acute cardiopulmonary disease.        .    MEDICAL RECORD  I have reviewed patient's medical record and pertinent results are listed above.    COURSE & MEDICAL DECISION MAKING  I have reviewed any medical record information, laboratory studies and radiographic results as noted above.  The patient comes in with hypotension. This looks like it is actually in his range of previous values. However, he is feeling dizzy with this. For this reason, a workup was undertaken. I see no evidence of a cardiac etiology in terms of an ST segment elevation myocardial infarction or elevated troponin. I do not suspect sepsis. His renal function is normal, he appears well-hydrated. He was given IV fluids, and upon recheck, he feels better, although again his pressures are about the same as it were previously. We have checked formal orthostatics after this, and he has no evidence of orthostasis. At this point, I do not think he needs further emergency workup. I discussed with him I would recommend increasing salt to his diet, plenty of fluids of the day. I have asked him to follow-up with his personal doctor to discuss possible pharmacological interventions as well. He is given generic instructions on orthostatic hypotension. He is return to the ER for any new symptoms return for worse.    FINAL IMPRESSION  1. Other specified hypotension    2. Other insomnia           This dictation was created using voice recognition software.    Electronically signed by: Riaz Moore, 12/13/2017 9:42 PM    "

## 2017-12-14 NOTE — ED NOTES
Feeling lightheaded since yesterday with low BP. He takes Unison at night for sleep. Blood sugar accu check 99.

## 2017-12-14 NOTE — ED NOTES
Pt reports only getting 3-4 hrs of sleep each night for the past 4 nights, takes unisom to help him sleep.

## 2017-12-14 NOTE — DISCHARGE INSTRUCTIONS
Orthostatic Hypotension    For now, plenty of fluids through the day.  Increase salt intake.  If this isn't helping, talk with Dr Frye about medicines that may improve your symptoms.  In the meantime, return to the ER for any new symptoms or any turn for the worse.    Orthostatic hypotension is a sudden drop in blood pressure. It happens when you quickly stand up from a seated or lying position. You may feel dizzy or light-headed. This can last for just a few seconds or for up to a few minutes. It is usually not a serious problem. However, if this happens frequently or gets worse, it can be a sign of something more serious.  CAUSES   Different things can cause orthostatic hypotension, including:   · Loss of body fluids (dehydration).  · Medicines that lower blood pressure.  · Sudden changes in posture, such as standing up quickly after you have been sitting or lying down.  · Taking too much of your medicine.  SIGNS AND SYMPTOMS   · Light-headedness or dizziness.    · Fainting or near-fainting.    · A fast heart rate.    · Weakness.    · Feeling tired (fatigue).    DIAGNOSIS   Your health care provider may do several things to help diagnose your condition and identify the cause. These may include:   · Taking a medical history and doing a physical exam.  · Checking your blood pressure. Your health care provider will check your blood pressure when you are:  ¨ Lying down.  ¨ Sitting.  ¨ Standing.  · Using tilt table testing. In this test, you lie down on a table that moves from a lying position to a standing position. You will be strapped onto the table. This test monitors your blood pressure and heart rate when you are in different positions.  TREATMENT   Treatment will vary depending on the cause. Possible treatments include:   · Changing the dosage of your medicines.   · Wearing compression stockings on your lower legs.  · Standing up slowly after sitting or lying down.  · Eating more salt.  · Eating frequent, small  meals.  · In some cases, getting IV fluids.  · Taking medicine to enhance fluid retention.  HOME CARE INSTRUCTIONS  · Only take over-the-counter or prescription medicines as directed by your health care provider.  ¨ Follow your health care provider's instructions for changing the dosage of your current medicines.  ¨ Do not stop or adjust your medicine on your own.  · Stand up slowly after sitting or lying down. This allows your body to adjust to the different position.  · Wear compression stockings as directed.  · Eat extra salt as directed.  ¨ Do not add extra salt to your diet unless directed to by your health care provider.  · Eat frequent, small meals.  · Avoid standing suddenly after eating.  · Avoid hot showers or excessive heat as directed by your health care provider.  · Keep all follow-up appointments.  SEEK MEDICAL CARE IF:  · You continue to feel dizzy or light-headed after standing.  · You feel groggy or confused.  · You feel cold, clammy, or sick to your stomach (nauseous).  · You have blurred vision.  · You feel short of breath.  SEEK IMMEDIATE MEDICAL CARE IF:   · You faint after standing.  · You have chest pain.   · You have difficulty breathing.    · You lose feeling or movement in your arms or legs.    · You have slurred speech or difficulty talking, or you are unable to talk.    MAKE SURE YOU:   · Understand these instructions.  · Will watch your condition.  · Will get help right away if you are not doing well or get worse.     This information is not intended to replace advice given to you by your health care provider. Make sure you discuss any questions you have with your health care provider.     Document Released: 12/08/2003 Document Revised: 12/23/2014 Document Reviewed: 10/10/2014  [employer] Interactive Patient Education ©2016 [employer]

## 2017-12-15 ENCOUNTER — PATIENT OUTREACH (OUTPATIENT)
Dept: HEALTH INFORMATION MANAGEMENT | Facility: OTHER | Age: 42
End: 2017-12-15

## 2017-12-16 LAB
BACTERIA UR CULT: NORMAL
SIGNIFICANT IND 70042: NORMAL
SITE SITE: NORMAL
SOURCE SOURCE: NORMAL

## 2017-12-18 LAB
BACTERIA BLD CULT: NORMAL
BACTERIA BLD CULT: NORMAL
SIGNIFICANT IND 70042: NORMAL
SIGNIFICANT IND 70042: NORMAL
SITE SITE: NORMAL
SITE SITE: NORMAL
SOURCE SOURCE: NORMAL
SOURCE SOURCE: NORMAL

## 2017-12-30 LAB — EKG IMPRESSION: NORMAL

## 2018-01-16 ENCOUNTER — TELEPHONE (OUTPATIENT)
Dept: MEDICAL GROUP | Facility: PHYSICIAN GROUP | Age: 43
End: 2018-01-16

## 2018-01-16 DIAGNOSIS — G47.00 INSOMNIA, UNSPECIFIED TYPE: ICD-10-CM

## 2018-01-16 RX ORDER — TRAZODONE HYDROCHLORIDE 100 MG/1
TABLET ORAL
Qty: 30 TAB | Refills: 1 | Status: SHIPPED | OUTPATIENT
Start: 2018-01-16 | End: 2018-01-17

## 2018-01-17 ENCOUNTER — APPOINTMENT (OUTPATIENT)
Dept: RADIOLOGY | Facility: MEDICAL CENTER | Age: 43
End: 2018-01-17
Attending: EMERGENCY MEDICINE
Payer: COMMERCIAL

## 2018-01-17 ENCOUNTER — HOSPITAL ENCOUNTER (EMERGENCY)
Facility: MEDICAL CENTER | Age: 43
End: 2018-01-17
Attending: EMERGENCY MEDICINE
Payer: COMMERCIAL

## 2018-01-17 ENCOUNTER — TELEPHONE (OUTPATIENT)
Dept: MEDICAL GROUP | Facility: PHYSICIAN GROUP | Age: 43
End: 2018-01-17

## 2018-01-17 ENCOUNTER — OFFICE VISIT (OUTPATIENT)
Dept: URGENT CARE | Facility: CLINIC | Age: 43
End: 2018-01-17
Payer: COMMERCIAL

## 2018-01-17 VITALS
TEMPERATURE: 97.9 F | BODY MASS INDEX: 30.39 KG/M2 | HEART RATE: 80 BPM | OXYGEN SATURATION: 97 % | SYSTOLIC BLOOD PRESSURE: 90 MMHG | HEIGHT: 64 IN | RESPIRATION RATE: 16 BRPM | DIASTOLIC BLOOD PRESSURE: 68 MMHG | WEIGHT: 178 LBS

## 2018-01-17 VITALS
DIASTOLIC BLOOD PRESSURE: 67 MMHG | HEIGHT: 63 IN | WEIGHT: 176.81 LBS | SYSTOLIC BLOOD PRESSURE: 108 MMHG | HEART RATE: 77 BPM | OXYGEN SATURATION: 95 % | TEMPERATURE: 98.4 F | BODY MASS INDEX: 31.33 KG/M2 | RESPIRATION RATE: 20 BRPM

## 2018-01-17 DIAGNOSIS — R07.89 CHEST DISCOMFORT: ICD-10-CM

## 2018-01-17 DIAGNOSIS — R07.9 CHEST PAIN, UNSPECIFIED TYPE: ICD-10-CM

## 2018-01-17 LAB
ALBUMIN SERPL BCP-MCNC: 4.2 G/DL (ref 3.2–4.9)
ALBUMIN/GLOB SERPL: 1.2 G/DL
ALP SERPL-CCNC: 78 U/L (ref 30–99)
ALT SERPL-CCNC: 89 U/L (ref 2–50)
ANION GAP SERPL CALC-SCNC: 10 MMOL/L (ref 0–11.9)
APTT PPP: 30.4 SEC (ref 24.7–36)
AST SERPL-CCNC: 37 U/L (ref 12–45)
BASOPHILS # BLD AUTO: 0.5 % (ref 0–1.8)
BASOPHILS # BLD: 0.04 K/UL (ref 0–0.12)
BILIRUB SERPL-MCNC: 0.7 MG/DL (ref 0.1–1.5)
BNP SERPL-MCNC: 10 PG/ML (ref 0–100)
BUN SERPL-MCNC: 12 MG/DL (ref 8–22)
CALCIUM SERPL-MCNC: 9.1 MG/DL (ref 8.4–10.2)
CHLORIDE SERPL-SCNC: 101 MMOL/L (ref 96–112)
CO2 SERPL-SCNC: 26 MMOL/L (ref 20–33)
CREAT SERPL-MCNC: 1.03 MG/DL (ref 0.5–1.4)
EKG IMPRESSION: NORMAL
EKG IMPRESSION: NORMAL
EOSINOPHIL # BLD AUTO: 0.11 K/UL (ref 0–0.51)
EOSINOPHIL NFR BLD: 1.3 % (ref 0–6.9)
ERYTHROCYTE [DISTWIDTH] IN BLOOD BY AUTOMATED COUNT: 38.9 FL (ref 35.9–50)
GLOBULIN SER CALC-MCNC: 3.5 G/DL (ref 1.9–3.5)
GLUCOSE SERPL-MCNC: 118 MG/DL (ref 65–99)
HCT VFR BLD AUTO: 45.6 % (ref 42–52)
HGB BLD-MCNC: 15.9 G/DL (ref 14–18)
IMM GRANULOCYTES # BLD AUTO: 0.04 K/UL (ref 0–0.11)
IMM GRANULOCYTES NFR BLD AUTO: 0.5 % (ref 0–0.9)
INR PPP: 1.03 (ref 0.87–1.13)
LIPASE SERPL-CCNC: 20 U/L (ref 7–58)
LYMPHOCYTES # BLD AUTO: 2.45 K/UL (ref 1–4.8)
LYMPHOCYTES NFR BLD: 28.6 % (ref 22–41)
MCH RBC QN AUTO: 31.6 PG (ref 27–33)
MCHC RBC AUTO-ENTMCNC: 34.9 G/DL (ref 33.7–35.3)
MCV RBC AUTO: 90.7 FL (ref 81.4–97.8)
MONOCYTES # BLD AUTO: 0.59 K/UL (ref 0–0.85)
MONOCYTES NFR BLD AUTO: 6.9 % (ref 0–13.4)
NEUTROPHILS # BLD AUTO: 5.35 K/UL (ref 1.82–7.42)
NEUTROPHILS NFR BLD: 62.2 % (ref 44–72)
NRBC # BLD AUTO: 0 K/UL
NRBC BLD-RTO: 0 /100 WBC
PLATELET # BLD AUTO: 247 K/UL (ref 164–446)
PMV BLD AUTO: 8.9 FL (ref 9–12.9)
POTASSIUM SERPL-SCNC: 3.6 MMOL/L (ref 3.6–5.5)
PROT SERPL-MCNC: 7.7 G/DL (ref 6–8.2)
PROTHROMBIN TIME: 13.1 SEC (ref 12–14.6)
RBC # BLD AUTO: 5.03 M/UL (ref 4.7–6.1)
SODIUM SERPL-SCNC: 137 MMOL/L (ref 135–145)
TROPONIN I SERPL-MCNC: <0.02 NG/ML (ref 0–0.04)
TROPONIN I SERPL-MCNC: <0.02 NG/ML (ref 0–0.04)
WBC # BLD AUTO: 8.6 K/UL (ref 4.8–10.8)

## 2018-01-17 PROCEDURE — 71045 X-RAY EXAM CHEST 1 VIEW: CPT

## 2018-01-17 PROCEDURE — 71045 X-RAY EXAM CHEST 1 VIEW: CPT | Performed by: EMERGENCY MEDICINE

## 2018-01-17 PROCEDURE — 99284 EMERGENCY DEPT VISIT MOD MDM: CPT

## 2018-01-17 PROCEDURE — 36415 COLL VENOUS BLD VENIPUNCTURE: CPT

## 2018-01-17 PROCEDURE — 85025 COMPLETE CBC W/AUTO DIFF WBC: CPT

## 2018-01-17 PROCEDURE — 99214 OFFICE O/P EST MOD 30 MIN: CPT | Performed by: FAMILY MEDICINE

## 2018-01-17 PROCEDURE — A9270 NON-COVERED ITEM OR SERVICE: HCPCS | Performed by: EMERGENCY MEDICINE

## 2018-01-17 PROCEDURE — 83690 ASSAY OF LIPASE: CPT

## 2018-01-17 PROCEDURE — 85730 THROMBOPLASTIN TIME PARTIAL: CPT

## 2018-01-17 PROCEDURE — 93005 ELECTROCARDIOGRAM TRACING: CPT | Performed by: EMERGENCY MEDICINE

## 2018-01-17 PROCEDURE — 83880 ASSAY OF NATRIURETIC PEPTIDE: CPT

## 2018-01-17 PROCEDURE — 85610 PROTHROMBIN TIME: CPT

## 2018-01-17 PROCEDURE — 84484 ASSAY OF TROPONIN QUANT: CPT

## 2018-01-17 PROCEDURE — 700102 HCHG RX REV CODE 250 W/ 637 OVERRIDE(OP): Performed by: EMERGENCY MEDICINE

## 2018-01-17 PROCEDURE — 80053 COMPREHEN METABOLIC PANEL: CPT

## 2018-01-17 RX ORDER — ASPIRIN 81 MG/1
324 TABLET, CHEWABLE ORAL ONCE
Status: COMPLETED | OUTPATIENT
Start: 2018-01-17 | End: 2018-01-17

## 2018-01-17 RX ORDER — ASPIRIN 600 MG/1
300 SUPPOSITORY RECTAL ONCE
Status: COMPLETED | OUTPATIENT
Start: 2018-01-17 | End: 2018-01-17

## 2018-01-17 RX ADMIN — ASPIRIN 81 MG 324 MG: 81 TABLET ORAL at 19:53

## 2018-01-17 RX ADMIN — LIDOCAINE HYDROCHLORIDE 30 ML: 20 SOLUTION OROPHARYNGEAL at 19:55

## 2018-01-17 ASSESSMENT — ENCOUNTER SYMPTOMS
ORTHOPNEA: 0
FOCAL WEAKNESS: 0
DIZZINESS: 0
SHORTNESS OF BREATH: 0
COUGH: 0
PALPITATIONS: 0
SPUTUM PRODUCTION: 0
CHILLS: 0
FEVER: 0

## 2018-01-17 ASSESSMENT — PAIN SCALES - GENERAL
PAINLEVEL_OUTOF10: 3

## 2018-01-17 NOTE — TELEPHONE ENCOUNTER
Did a test for H pylori and it came back Positive and Would Like A STAT LAB for H Pylori Ordered so he can rerun it

## 2018-01-17 NOTE — PROGRESS NOTES
"Subjective:      Lucas Simmons is a 42 y.o. male who presents with Chest Burn (C/o burning pain around sternum and LT side of chest x2 days.  )    Chief Complaint   Patient presents with   • Chest Burn     C/o burning pain around sternum and LT side of chest x2 days.          - This is a very pleasant 42 y.o. male with complaints of Hx of GERD for yrs on/off. Symptoms include gastric/epigastric burning, acid taste in mouth and burping at times. Never had GI work up. No cardiac like CP/SOB/Diaphoresis or exertional symptoms. 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     PMH:  Past Medical History:   Diagnosis Date   • Arrhythmia         MEDS:    Current Outpatient Prescriptions:   •  Pkvxxtxbk-Mfdeakzlx-Fbfenrdql Misc, 1 pack. Use as directed, Disp: 1 Each, Rfl: 0  •  diazepam (VALIUM) 5 MG Tab, Take 1 Tab by mouth every 12 hours as needed for Anxiety., Disp: 30 Tab, Rfl: 0  •  trazodone (DESYREL) 100 MG Tab, Take half a tablet by mouth every night for one week and go up to one full tablet if no good results., Disp: 30 Tab, Rfl: 1  •  melatonin 3 MG Tab, Take 3 mg by mouth every bedtime., Disp: , Rfl:   •  Multiple Vitamin (MULTI VITAMIN DAILY PO), Take  by mouth., Disp: , Rfl:     ** I have documented what I find to be significant in regards to past medical, social, family and surgical history  in my HPI or under PMH/PSH/FH review section, otherwise it is contributory **           HPI    Review of Systems   Constitutional: Negative for chills and fever.   Respiratory: Negative for cough, sputum production and shortness of breath.    Cardiovascular: Positive for chest pain. Negative for palpitations, orthopnea and leg swelling.   Neurological: Negative for dizziness and focal weakness.          Objective:     BP (!) 90/68   Pulse 80   Temp 36.6 °C (97.9 °F)   Resp 16   Ht 1.626 m (5' 4\")   Wt 80.7 kg (178 lb)   SpO2 97%  "  BMI 30.55 kg/m²      Physical Exam   Constitutional: He appears well-developed. No distress.   HENT:   Head: Normocephalic and atraumatic.   Mouth/Throat: Oropharynx is clear and moist.   Eyes: Conjunctivae are normal.   Neck: Neck supple.   Cardiovascular: Regular rhythm.    No murmur heard.  Pulmonary/Chest: Effort normal and breath sounds normal. No respiratory distress.   Abdominal: Soft. There is no tenderness.   Neurological: He is alert. He exhibits normal muscle tone.   Skin: Skin is warm and dry.   Psychiatric: He has a normal mood and affect. Judgment normal.   Nursing note and vitals reviewed.              Assessment/Plan:         1. Chest discomfort  REFERRAL TO GASTROENTEROLOGY    Meghan Misc    CANCELED: EKG - Clinic performed             Dx & d/c instructions discussed w/ patient and/or family members. Follow up w/ Prvt Dr or here in 3-4 days if not getting better, sooner if needed,  ER if worse and UC/PCP unavailable.        Possible side effects (i.e. Rash, GI upset/constipation, sedation, elevation of BP or sugars) of any medications given discussed.

## 2018-01-18 ENCOUNTER — PATIENT OUTREACH (OUTPATIENT)
Dept: HEALTH INFORMATION MANAGEMENT | Facility: OTHER | Age: 43
End: 2018-01-18

## 2018-01-18 ENCOUNTER — APPOINTMENT (OUTPATIENT)
Dept: MEDICAL GROUP | Facility: PHYSICIAN GROUP | Age: 43
End: 2018-01-18
Payer: COMMERCIAL

## 2018-01-18 DIAGNOSIS — R12 HEARTBURN: ICD-10-CM

## 2018-01-18 NOTE — ED NOTES
Pt in room sitting in bed, complains of intermittent chest pain, primarily after he eats, for past year. Pt states episodes normally last a hour or so, this episode began about and hour ago after eating. Pt also feels like abdomen is bloated and distended more than normal. Pt state he was tested for H Pylori in past few days, was positive and prescribed medications for infection, antibiotic and Prilosec which he began taking this am. Pt medicated per MAR. IV started, blood samples taken to lab. After medications, pt states chest pain is the same, and feels like his chest is sore.

## 2018-01-18 NOTE — ED PROVIDER NOTES
ED Provider Note    Repeat twelve-lead EKG interpretation  Repeat 12-lead EKG interpreted by myself shows a normal sinus rhythm with ventricular 82, normal QRS, normal intervals, no ST segment elevation or depression, T-wave still inverted in lead 3 overall no dynamic changes from prior

## 2018-01-18 NOTE — ED NOTES
Patient provided printed discharge instructions which included signs and symptoms to look out for, why to return to ER, and other follow up appointments to make. Patient stated they understand discharge instructions and had no further questions or concerns at this time. Patient discharged to home by self. Patient ambulated out of ER with stable gait.

## 2018-01-18 NOTE — ED NOTES
"  /67   Pulse 99   Temp 36.9 °C (98.4 °F)   Resp 16   Ht 1.6 m (5' 3\")   Wt 80.2 kg (176 lb 12.9 oz)   SpO2 96%   BMI 31.32 kg/m²     "

## 2018-01-18 NOTE — ED PROVIDER NOTES
"ED Provider Note    CHIEF COMPLAINT  Chief Complaint   Patient presents with   • Chest Pain     Intermittent left sided chest pain, started yesterday while at work. Described as burning pain, states \"sometimes worst after I eat.\"        HPI  Lucas Simmons is a 42 y.o. male who presents to the emergency department with chest discomfort. The patient states he's had the pain since last Thursday when he went to a chiropractor and he is started on multiple herbal remedies. The herbal medication has a lot of vitamins. He states when he takes the medicine he starts developing some burning discomfort in his chest in the substernal region with some associated pressure. He states he also feels like his muscles and left-sided his chest has been spasming. The patient does not have any exertional component. He has no dyspnea. He does not have any associated nausea nor diaphoresis. He does not have any cardiac risk factors. He denies pain or swelling to his lower extremities. At this time he does have some burning discomfort in the epigastric region he states is also worse after he eats.    REVIEW OF SYSTEMS  See HPI for further details. All other systems are negative.     PAST MEDICAL HISTORY  Past Medical History:   Diagnosis Date   • Arrhythmia        SOCIAL HISTORY  Social History     Social History   • Marital status:      Spouse name: N/A   • Number of children: 2   • Years of education: N/A     Occupational History   • [employer]      Social History Main Topics   • Smoking status: Never Smoker   • Smokeless tobacco: Never Used   • Alcohol use 0.0 oz/week      Comment: occasional   • Drug use: No   • Sexual activity: Yes     Partners: Male     Other Topics Concern   • Not on file     Social History Narrative   • No narrative on file           PHYSICAL EXAM  VITAL SIGNS: /67   Pulse 99   Temp 36.9 °C (98.4 °F)   Resp 16   Ht 1.6 m (5' 3\")   Wt 80.2 kg (176 lb 12.9 oz)   SpO2 96%   BMI 31.32 kg/m² "   Constitutional: Well developed, Well nourished, No acute distress, Non-toxic appearance.   HENT: Normocephalic, Atraumatic, tympanic membranes are intact and nonerythematous bilaterally, Oropharynx moist without exudates or erythema, Nose normal.   Eyes: PERRLA, EOMI, Conjunctiva normal.  Neck: Supple without meningismus  Lymphatic: No lymphadenopathy noted.   Cardiovascular: Normal heart rate, Normal rhythm, No murmurs, No rubs, No gallops.   Thorax & Lungs: Normal breath sounds, No respiratory distress, No wheezing, No chest tenderness.   Abdomen: Bowel sounds normal, Soft, No tenderness, no rebound, no guarding, no distention, No masses, No pulsatile masses.   Skin: Warm, Dry, No erythema, No rash.   Back: No tenderness, No CVA tenderness.   Extremities: Atraumatic with symmetric distal pulses, No edema, No tenderness, No cyanosis, No clubbing.   Neurologic: Alert & oriented x 3, cranial nerves II through XII are intact, Normal motor function, Normal sensory function, No focal deficits noted.   Psychiatric: Affect normal, Judgment normal, Mood normal.     EKG  12-lead EKG interpreted by myself shows a normal sinus rhythm with a ventricular rate 93, normal QRS, normal intervals, no ST segment elevation or depression, normal T waves.    RADIOLOGY/PROCEDURES  DX-CHEST-LIMITED (1 VIEW)   Final Result      No evidence of acute cardiopulmonary process.            COURSE & MEDICAL DECISION MAKING  Pertinent Labs & Imaging studies reviewed. (See chart for details)  This a 42-year-old gentleman who presents with chest discomfort. I suspect this is from esophageal reflux disease as he has a known history of gastritis and has been positive for H. pylori. From a cardiac standpoint does not have any risk factors. He does not have any dynamic changes on his EKG. A second troponin will be ordered 2 hours after the initial one and if this is negative the patient be discharged with instructions for outpatient follow-up with  cardiology for a stress test. As further possible sources the patient's chest x-ray did not show any evidence for pulmonary source. The patient's abdomen is benign and therefore referred pain to be unlikely. Does not have any risk factors from a pulmonary embolus standpoint has been ruled out via the per criteria. He does feel better after a GI cocktail. His abdomen is benign on repeat examination at 2130.    FINAL IMPRESSION  1. Chest pain    Electronically signed by: Peng Cano, 1/17/2018 7:45 PM

## 2018-01-18 NOTE — DISCHARGE INSTRUCTIONS
Chest Pain, Nonspecific  It is often hard to give a specific diagnosis for the cause of chest pain. There is always a chance that your pain could be related to something serious, like a heart attack or a blood clot in the lungs. You need to follow up with your caregiver for further evaluation. More lab tests or other studies such as X-rays, electrocardiography, stress testing, or cardiac imaging may be needed to find the cause of your pain.  Most of the time, nonspecific chest pain improves within 2 to 3 days with rest and mild pain medicine. For the next few days, avoid physical exertion or activities that bring on pain. Do not smoke. Avoid drinking alcohol. Call your caregiver for routine follow-up as advised.   SEEK IMMEDIATE MEDICAL CARE IF:  · You develop increased chest pain or pain that radiates to the arm, neck, jaw, back, or abdomen.   · You develop shortness of breath, increased coughing, or you start coughing up blood.   · You have severe back or abdominal pain, nausea, or vomiting.   · You develop severe weakness, fainting, fever, or chills.   Document Released: 12/18/2006 Document Revised: 03/11/2013 Document Reviewed: 06/06/2008  [employer]® Patient Information ©2013 [employer].

## 2018-01-18 NOTE — ED NOTES
"Chief Complaint   Patient presents with   • Chest Pain     Intermittent left sided chest pain, started yesterday while at work. Described as burning pain, states \"sometimes worst after I eat.\"        "

## 2018-05-17 ENCOUNTER — APPOINTMENT (OUTPATIENT)
Dept: RADIOLOGY | Facility: MEDICAL CENTER | Age: 43
End: 2018-05-17
Attending: EMERGENCY MEDICINE
Payer: COMMERCIAL

## 2018-05-17 ENCOUNTER — HOSPITAL ENCOUNTER (OUTPATIENT)
Facility: MEDICAL CENTER | Age: 43
End: 2018-05-17
Attending: INTERNAL MEDICINE
Payer: COMMERCIAL

## 2018-05-17 ENCOUNTER — HOSPITAL ENCOUNTER (OUTPATIENT)
Facility: MEDICAL CENTER | Age: 43
End: 2018-05-18
Attending: EMERGENCY MEDICINE | Admitting: INTERNAL MEDICINE
Payer: COMMERCIAL

## 2018-05-17 DIAGNOSIS — R00.2 PALPITATIONS: ICD-10-CM

## 2018-05-17 DIAGNOSIS — I49.3 PVC (PREMATURE VENTRICULAR CONTRACTION): ICD-10-CM

## 2018-05-17 DIAGNOSIS — R07.9 CHEST PAIN, UNSPECIFIED TYPE: ICD-10-CM

## 2018-05-17 DIAGNOSIS — I20.0 UNSTABLE ANGINA (HCC): ICD-10-CM

## 2018-05-17 DIAGNOSIS — R07.89 ATYPICAL CHEST PAIN: ICD-10-CM

## 2018-05-17 DIAGNOSIS — I49.1 PREMATURE ATRIAL COMPLEX: ICD-10-CM

## 2018-05-17 PROBLEM — E87.6 HYPOKALEMIA: Status: ACTIVE | Noted: 2018-05-17

## 2018-05-17 PROBLEM — F41.9 ANXIETY: Status: ACTIVE | Noted: 2018-05-17

## 2018-05-17 PROBLEM — R74.01 TRANSAMINITIS: Status: ACTIVE | Noted: 2018-05-17

## 2018-05-17 LAB
ALBUMIN SERPL BCP-MCNC: 3.6 G/DL (ref 3.2–4.9)
ALBUMIN SERPL BCP-MCNC: 4.1 G/DL (ref 3.2–4.9)
ALBUMIN/GLOB SERPL: 1.2 G/DL
ALP SERPL-CCNC: 72 U/L (ref 30–99)
ALP SERPL-CCNC: 79 U/L (ref 30–99)
ALT SERPL-CCNC: 72 U/L (ref 2–50)
ALT SERPL-CCNC: 81 U/L (ref 2–50)
ANION GAP SERPL CALC-SCNC: 3 MMOL/L (ref 0–11.9)
ANION GAP SERPL CALC-SCNC: 6 MMOL/L (ref 0–11.9)
APTT PPP: 32.8 SEC (ref 24.7–36)
AST SERPL-CCNC: 37 U/L (ref 12–45)
AST SERPL-CCNC: 38 U/L (ref 12–45)
BASOPHILS # BLD AUTO: 0.6 % (ref 0–1.8)
BASOPHILS # BLD: 0.05 K/UL (ref 0–0.12)
BILIRUB CONJ SERPL-MCNC: 0.1 MG/DL (ref 0.1–0.5)
BILIRUB INDIRECT SERPL-MCNC: 0.5 MG/DL (ref 0–1)
BILIRUB SERPL-MCNC: 0.6 MG/DL (ref 0.1–1.5)
BILIRUB SERPL-MCNC: 1.1 MG/DL (ref 0.1–1.5)
BNP SERPL-MCNC: 15 PG/ML (ref 0–100)
BUN SERPL-MCNC: 10 MG/DL (ref 8–22)
BUN SERPL-MCNC: 12 MG/DL (ref 8–22)
CALCIUM SERPL-MCNC: 9 MG/DL (ref 8.4–10.2)
CALCIUM SERPL-MCNC: 9 MG/DL (ref 8.4–10.2)
CHLORIDE SERPL-SCNC: 106 MMOL/L (ref 96–112)
CHLORIDE SERPL-SCNC: 106 MMOL/L (ref 96–112)
CHOLEST SERPL-MCNC: 181 MG/DL (ref 100–199)
CO2 SERPL-SCNC: 24 MMOL/L (ref 20–33)
CO2 SERPL-SCNC: 29 MMOL/L (ref 20–33)
CREAT SERPL-MCNC: 0.92 MG/DL (ref 0.5–1.4)
CREAT SERPL-MCNC: 0.99 MG/DL (ref 0.5–1.4)
DEPRECATED D DIMER PPP IA-ACNC: <200 NG/ML(D-DU)
EKG IMPRESSION: NORMAL
EKG IMPRESSION: NORMAL
EOSINOPHIL # BLD AUTO: 0.19 K/UL (ref 0–0.51)
EOSINOPHIL NFR BLD: 2.2 % (ref 0–6.9)
ERYTHROCYTE [DISTWIDTH] IN BLOOD BY AUTOMATED COUNT: 39.8 FL (ref 35.9–50)
EST. AVERAGE GLUCOSE BLD GHB EST-MCNC: 120 MG/DL
FLUAV+FLUBV AG SPEC QL IA: NORMAL
GLOBULIN SER CALC-MCNC: 3.5 G/DL (ref 1.9–3.5)
GLUCOSE SERPL-MCNC: 100 MG/DL (ref 65–99)
GLUCOSE SERPL-MCNC: 101 MG/DL (ref 65–99)
HAV IGM SERPL QL IA: NEGATIVE
HBA1C MFR BLD: 5.8 % (ref 0–5.6)
HBV CORE IGM SER QL: NEGATIVE
HBV SURFACE AG SER QL: NEGATIVE
HCT VFR BLD AUTO: 46.4 % (ref 42–52)
HCV AB SER QL: NEGATIVE
HDLC SERPL-MCNC: 38 MG/DL
HGB BLD-MCNC: 15.9 G/DL (ref 14–18)
IMM GRANULOCYTES # BLD AUTO: 0.03 K/UL (ref 0–0.11)
IMM GRANULOCYTES NFR BLD AUTO: 0.3 % (ref 0–0.9)
INR PPP: 0.92 (ref 0.87–1.13)
LDLC SERPL CALC-MCNC: 104 MG/DL
LYMPHOCYTES # BLD AUTO: 3.9 K/UL (ref 1–4.8)
LYMPHOCYTES NFR BLD: 44.2 % (ref 22–41)
MAGNESIUM SERPL-MCNC: 2.3 MG/DL (ref 1.5–2.5)
MCH RBC QN AUTO: 31.7 PG (ref 27–33)
MCHC RBC AUTO-ENTMCNC: 34.3 G/DL (ref 33.7–35.3)
MCV RBC AUTO: 92.6 FL (ref 81.4–97.8)
MONOCYTES # BLD AUTO: 1.02 K/UL (ref 0–0.85)
MONOCYTES NFR BLD AUTO: 11.6 % (ref 0–13.4)
NEUTROPHILS # BLD AUTO: 3.63 K/UL (ref 1.82–7.42)
NEUTROPHILS NFR BLD: 41.1 % (ref 44–72)
NRBC # BLD AUTO: 0 K/UL
NRBC BLD-RTO: 0 /100 WBC
PHOSPHATE SERPL-MCNC: 2.7 MG/DL (ref 2.5–4.5)
PLATELET # BLD AUTO: 229 K/UL (ref 164–446)
PMV BLD AUTO: 9.4 FL (ref 9–12.9)
POTASSIUM SERPL-SCNC: 3.2 MMOL/L (ref 3.6–5.5)
POTASSIUM SERPL-SCNC: 4 MMOL/L (ref 3.6–5.5)
PROT SERPL-MCNC: 7.1 G/DL (ref 6–8.2)
PROT SERPL-MCNC: 7.6 G/DL (ref 6–8.2)
PROTHROMBIN TIME: 12.3 SEC (ref 12–14.6)
RBC # BLD AUTO: 5.01 M/UL (ref 4.7–6.1)
SIGNIFICANT IND 70042: NORMAL
SITE SITE: NORMAL
SODIUM SERPL-SCNC: 136 MMOL/L (ref 135–145)
SODIUM SERPL-SCNC: 138 MMOL/L (ref 135–145)
SOURCE SOURCE: NORMAL
TRIGL SERPL-MCNC: 195 MG/DL (ref 0–149)
TROPONIN I SERPL-MCNC: 0.05 NG/ML (ref 0–0.04)
TROPONIN I SERPL-MCNC: 0.07 NG/ML (ref 0–0.04)
TROPONIN I SERPL-MCNC: <0.02 NG/ML (ref 0–0.04)
TSH SERPL DL<=0.005 MIU/L-ACNC: 1.41 UIU/ML (ref 0.38–5.33)
WBC # BLD AUTO: 8.8 K/UL (ref 4.8–10.8)

## 2018-05-17 PROCEDURE — 700111 HCHG RX REV CODE 636 W/ 250 OVERRIDE (IP): Performed by: INTERNAL MEDICINE

## 2018-05-17 PROCEDURE — 80076 HEPATIC FUNCTION PANEL: CPT

## 2018-05-17 PROCEDURE — 80061 LIPID PANEL: CPT

## 2018-05-17 PROCEDURE — A9270 NON-COVERED ITEM OR SERVICE: HCPCS | Performed by: INTERNAL MEDICINE

## 2018-05-17 PROCEDURE — 84443 ASSAY THYROID STIM HORMONE: CPT

## 2018-05-17 PROCEDURE — 700102 HCHG RX REV CODE 250 W/ 637 OVERRIDE(OP): Performed by: INTERNAL MEDICINE

## 2018-05-17 PROCEDURE — 36415 COLL VENOUS BLD VENIPUNCTURE: CPT

## 2018-05-17 PROCEDURE — 85730 THROMBOPLASTIN TIME PARTIAL: CPT

## 2018-05-17 PROCEDURE — 99219 PR INITIAL OBSERVATION CARE,LEVL II: CPT | Performed by: INTERNAL MEDICINE

## 2018-05-17 PROCEDURE — 80048 BASIC METABOLIC PNL TOTAL CA: CPT

## 2018-05-17 PROCEDURE — A9270 NON-COVERED ITEM OR SERVICE: HCPCS | Performed by: EMERGENCY MEDICINE

## 2018-05-17 PROCEDURE — 80074 ACUTE HEPATITIS PANEL: CPT

## 2018-05-17 PROCEDURE — 71045 X-RAY EXAM CHEST 1 VIEW: CPT

## 2018-05-17 PROCEDURE — 94760 N-INVAS EAR/PLS OXIMETRY 1: CPT

## 2018-05-17 PROCEDURE — 87400 INFLUENZA A/B EACH AG IA: CPT

## 2018-05-17 PROCEDURE — 80053 COMPREHEN METABOLIC PANEL: CPT

## 2018-05-17 PROCEDURE — 83036 HEMOGLOBIN GLYCOSYLATED A1C: CPT

## 2018-05-17 PROCEDURE — 85025 COMPLETE CBC W/AUTO DIFF WBC: CPT

## 2018-05-17 PROCEDURE — 85610 PROTHROMBIN TIME: CPT

## 2018-05-17 PROCEDURE — 93005 ELECTROCARDIOGRAM TRACING: CPT | Performed by: EMERGENCY MEDICINE

## 2018-05-17 PROCEDURE — 700102 HCHG RX REV CODE 250 W/ 637 OVERRIDE(OP): Performed by: EMERGENCY MEDICINE

## 2018-05-17 PROCEDURE — G0378 HOSPITAL OBSERVATION PER HR: HCPCS

## 2018-05-17 PROCEDURE — 84100 ASSAY OF PHOSPHORUS: CPT

## 2018-05-17 PROCEDURE — 99285 EMERGENCY DEPT VISIT HI MDM: CPT

## 2018-05-17 PROCEDURE — 96372 THER/PROPH/DIAG INJ SC/IM: CPT

## 2018-05-17 PROCEDURE — 83735 ASSAY OF MAGNESIUM: CPT

## 2018-05-17 PROCEDURE — 83880 ASSAY OF NATRIURETIC PEPTIDE: CPT

## 2018-05-17 PROCEDURE — 84484 ASSAY OF TROPONIN QUANT: CPT | Mod: 91

## 2018-05-17 PROCEDURE — 85379 FIBRIN DEGRADATION QUANT: CPT

## 2018-05-17 RX ORDER — BISACODYL 10 MG
10 SUPPOSITORY, RECTAL RECTAL
Status: DISCONTINUED | OUTPATIENT
Start: 2018-05-17 | End: 2018-05-18 | Stop reason: HOSPADM

## 2018-05-17 RX ORDER — NITROGLYCERIN 0.4 MG/1
0.4 TABLET SUBLINGUAL ONCE
Status: COMPLETED | OUTPATIENT
Start: 2018-05-17 | End: 2018-05-17

## 2018-05-17 RX ORDER — LABETALOL HYDROCHLORIDE 5 MG/ML
10 INJECTION, SOLUTION INTRAVENOUS EVERY 4 HOURS PRN
Status: DISCONTINUED | OUTPATIENT
Start: 2018-05-17 | End: 2018-05-18 | Stop reason: HOSPADM

## 2018-05-17 RX ORDER — LORAZEPAM 2 MG/ML
0.5 INJECTION INTRAMUSCULAR EVERY 6 HOURS PRN
Status: DISCONTINUED | OUTPATIENT
Start: 2018-05-17 | End: 2018-05-18 | Stop reason: HOSPADM

## 2018-05-17 RX ORDER — LORAZEPAM 1 MG/1
0.5 TABLET ORAL EVERY 6 HOURS PRN
Status: DISCONTINUED | OUTPATIENT
Start: 2018-05-17 | End: 2018-05-18 | Stop reason: HOSPADM

## 2018-05-17 RX ORDER — ASPIRIN 81 MG/1
324 TABLET, CHEWABLE ORAL DAILY
Status: DISCONTINUED | OUTPATIENT
Start: 2018-05-17 | End: 2018-05-18 | Stop reason: HOSPADM

## 2018-05-17 RX ORDER — AMOXICILLIN 250 MG
2 CAPSULE ORAL 2 TIMES DAILY
Status: DISCONTINUED | OUTPATIENT
Start: 2018-05-17 | End: 2018-05-18 | Stop reason: HOSPADM

## 2018-05-17 RX ORDER — POLYETHYLENE GLYCOL 3350 17 G/17G
1 POWDER, FOR SOLUTION ORAL
Status: DISCONTINUED | OUTPATIENT
Start: 2018-05-17 | End: 2018-05-18 | Stop reason: HOSPADM

## 2018-05-17 RX ORDER — ASPIRIN 81 MG/1
324 TABLET, CHEWABLE ORAL ONCE
Status: COMPLETED | OUTPATIENT
Start: 2018-05-17 | End: 2018-05-17

## 2018-05-17 RX ORDER — POTASSIUM CHLORIDE 20 MEQ/1
40 TABLET, EXTENDED RELEASE ORAL EVERY 4 HOURS
Status: DISPENSED | OUTPATIENT
Start: 2018-05-17 | End: 2018-05-18

## 2018-05-17 RX ORDER — ONDANSETRON 2 MG/ML
4 INJECTION INTRAMUSCULAR; INTRAVENOUS EVERY 4 HOURS PRN
Status: DISCONTINUED | OUTPATIENT
Start: 2018-05-17 | End: 2018-05-18 | Stop reason: HOSPADM

## 2018-05-17 RX ORDER — ONDANSETRON 4 MG/1
4 TABLET, ORALLY DISINTEGRATING ORAL EVERY 4 HOURS PRN
Status: DISCONTINUED | OUTPATIENT
Start: 2018-05-17 | End: 2018-05-18 | Stop reason: HOSPADM

## 2018-05-17 RX ORDER — ASPIRIN 600 MG/1
300 SUPPOSITORY RECTAL DAILY
Status: DISCONTINUED | OUTPATIENT
Start: 2018-05-17 | End: 2018-05-18 | Stop reason: HOSPADM

## 2018-05-17 RX ORDER — ACETAMINOPHEN 325 MG/1
650 TABLET ORAL EVERY 6 HOURS PRN
Status: DISCONTINUED | OUTPATIENT
Start: 2018-05-17 | End: 2018-05-18 | Stop reason: HOSPADM

## 2018-05-17 RX ORDER — ASPIRIN 325 MG
325 TABLET ORAL DAILY
Status: DISCONTINUED | OUTPATIENT
Start: 2018-05-17 | End: 2018-05-18 | Stop reason: HOSPADM

## 2018-05-17 RX ADMIN — ASPIRIN 81 MG 324 MG: 81 TABLET ORAL at 15:46

## 2018-05-17 RX ADMIN — POTASSIUM CHLORIDE 40 MEQ: 1500 TABLET, EXTENDED RELEASE ORAL at 20:35

## 2018-05-17 RX ADMIN — ASPIRIN 325 MG ORAL TABLET 325 MG: 325 PILL ORAL at 20:33

## 2018-05-17 RX ADMIN — NITROGLYCERIN 0.4 MG: 0.4 TABLET SUBLINGUAL at 17:10

## 2018-05-17 RX ADMIN — ENOXAPARIN SODIUM 40 MG: 100 INJECTION SUBCUTANEOUS at 20:38

## 2018-05-17 ASSESSMENT — LIFESTYLE VARIABLES
ALCOHOL_USE: NO
EVER_SMOKED: NEVER
SUBSTANCE_ABUSE: 0
EVER_SMOKED: NEVER

## 2018-05-17 ASSESSMENT — PATIENT HEALTH QUESTIONNAIRE - PHQ9
SUM OF ALL RESPONSES TO PHQ9 QUESTIONS 1 AND 2: 0
1. LITTLE INTEREST OR PLEASURE IN DOING THINGS: NOT AT ALL
2. FEELING DOWN, DEPRESSED, IRRITABLE, OR HOPELESS: NOT AT ALL

## 2018-05-17 ASSESSMENT — ENCOUNTER SYMPTOMS
COUGH: 0
SPEECH CHANGE: 0
NECK PAIN: 0
CLAUDICATION: 0
MEMORY LOSS: 0
TINGLING: 0
MYALGIAS: 0
NAUSEA: 1
SEIZURES: 0
DIARRHEA: 0
BLURRED VISION: 0
VOMITING: 0
BLOOD IN STOOL: 0
SENSORY CHANGE: 0
WHEEZING: 0
WEIGHT LOSS: 0
WEAKNESS: 1
SPUTUM PRODUCTION: 0
ABDOMINAL PAIN: 0
HEADACHES: 0
BACK PAIN: 0
CHILLS: 0
DEPRESSION: 0
ORTHOPNEA: 0
DIAPHORESIS: 0
SHORTNESS OF BREATH: 1
FLANK PAIN: 0
HEMOPTYSIS: 0
LOSS OF CONSCIOUSNESS: 0
DOUBLE VISION: 0
PALPITATIONS: 1
DIZZINESS: 1
FEVER: 0
PND: 0
NERVOUS/ANXIOUS: 1
HEARTBURN: 0
INSOMNIA: 1
TREMORS: 0
HALLUCINATIONS: 0
CONSTIPATION: 0
FOCAL WEAKNESS: 0
FALLS: 0

## 2018-05-17 ASSESSMENT — COPD QUESTIONNAIRES
IN THE PAST 12 MONTHS DO YOU DO LESS THAN YOU USED TO BECAUSE OF YOUR BREATHING PROBLEMS: DISAGREE/UNSURE
DURING THE PAST 4 WEEKS HOW MUCH DID YOU FEEL SHORT OF BREATH: NONE/LITTLE OF THE TIME
DO YOU EVER COUGH UP ANY MUCUS OR PHLEGM?: NO/ONLY WITH OCCASIONAL COLDS OR INFECTIONS
HAVE YOU SMOKED AT LEAST 100 CIGARETTES IN YOUR ENTIRE LIFE: NO/DON'T KNOW
COPD SCREENING SCORE: 0

## 2018-05-17 ASSESSMENT — PAIN SCALES - GENERAL: PAINLEVEL_OUTOF10: 0

## 2018-05-17 NOTE — ED PROVIDER NOTES
ER Provider Note         CHIEF COMPLAINT  Chief Complaint   Patient presents with   • Chest Pressure   • Irregular Heart Beat       HPI  Lucas Simmons is a 42 y.o. male who presents to the Emergency Department who presents with chest pain as well as palpitations of the been going on for the past 2 days.  The patient says that he has a slight discomfort in his chest and some shortness of breath that is associated with this.  He denies any leg swelling or recent travel.  Denies any nausea or vomiting.  He says that it is not positional nor exertional pain that he is feeling.  While he was in the laboratory today he checked his BNP as well as troponin and said that they were elevated.  He also mentioned that he was having some issues with sweating associated with chest pain.  The highest troponin level he got he said was around 10 AM and it was 0.06.  He said his BNP was in the 200s as well.    REVIEW OF SYSTEMS  See HPI for further details. All other systems are negative.     PAST MEDICAL HISTORY   has a past medical history of Arrhythmia.    SURGICAL HISTORY  patient denies any surgical history    SOCIAL HISTORY  Social History   Substance Use Topics   • Smoking status: Never Smoker   • Smokeless tobacco: Never Used   • Alcohol use 0.0 oz/week      Comment: occasional      History   Drug Use No       FAMILY HISTORY  Family History   Problem Relation Age of Onset   • Hypertension Mother    • Cancer Father      liver cancer    • Alcohol/Drug Father    • Arthritis Sister    • Arthritis Brother    • Arthritis Brother    • Arthritis Brother        CURRENT MEDICATIONS  Home Medications     Reviewed by Ha Dash (Pharmacy Tech) on 05/17/18 at 1544  Med List Status: Complete   Medication Last Dose Status        Patient Daniel Taking any Medications                       ALLERGIES  No Known Allergies    PHYSICAL EXAM  VITAL SIGNS: /92   Pulse (!) 130   Temp 36.9 °C (98.4 °F)   Resp 20   Wt 80 kg (176  lb 5.9 oz)   SpO2 96%   BMI 31.24 kg/m²      Constitutional: Alert in no apparent distress.  HENT: No signs of trauma, Bilateral external ears normal, Nose normal.   Eyes: Pupils are equal and reactive, Conjunctiva normal, Non-icteric.   Neck: Normal range of motion, No tenderness, Supple, No stridor.   Lymphatic: No lymphadenopathy noted.   Cardiovascular: Tachycardic with no murmurs  Thorax & Lungs: Normal breath sounds, No respiratory distress, No wheezing, No chest tenderness.   Abdomen: Bowel sounds normal, Soft, No tenderness, No masses, No pulsatile masses. No peritoneal signs.  Skin: Warm, Dry, No erythema, No rash.   Back: No bony tenderness, No CVA tenderness.   Extremities: Intact distal pulses, No edema, No tenderness, No cyanosis.  Musculoskeletal: Good range of motion in all major joints. No tenderness to palpation or major deformities noted.   Neurologic: Alert , Normal motor function, Normal sensory function, No focal deficits noted.   Psychiatric: Affect normal, Judgment normal, Mood normal.     DIAGNOSTIC STUDIES / PROCEDURES    EKG Interpretation:  Interpreted by me  Sinus tachycardia with no ST-T wave changes and no ectopy.  There is a normal axis with a rate of 101    The patient was having chest pain at around 1640 so another EKG was performed.  There is no significant change with a rate of 86 with no ST-T wave changes and no ectopy.    LABS  Labs Reviewed   CBC WITH DIFFERENTIAL - Abnormal; Notable for the following:        Result Value    Neutrophils-Polys 41.10 (*)     Lymphocytes 44.20 (*)     Monos (Absolute) 1.02 (*)     All other components within normal limits   BASIC METABOLIC PANEL - Abnormal; Notable for the following:     Potassium 3.2 (*)     Glucose 100 (*)     All other components within normal limits   TROPONIN - Abnormal; Notable for the following:     Troponin I 0.07 (*)     All other components within normal limits   D-DIMER   BTYPE NATRIURETIC PEPTIDE   ESTIMATED GFR   TSH    TROPONIN   INFLUENZA RAPID       All labs reviewed by me.    RADIOLOGY  DX-CHEST-PORTABLE (1 VIEW)   Final Result      Negative single view of the chest.          The radiologist's interpretation of all radiological studies have been reviewed by me.    COURSE & MEDICAL DECISION MAKING  Pertinent Labs & Imaging studies reviewed. (See chart for details)    This is a 42 y.o. male that presents with chest pain as well as palpitations is been going on for the past few days.  I am concerned this could represent myocardial ischemia and less likely pulmonary embolism.  In addition this could represent pneumothorax or pneumonia therefore will be getting a chest x-ray.  There is no evidence of volume overload however the patient told me he does have an elevated BNP.  I will order a BNP to confirm this.  I will also order a troponin and d-dimer.  His EKG is nonischemic at this time.    4:04 PM - Patient seen and examined at bedside.     4:40 PM-the patient was found to have a negative d-dimer as well as a normal BNP.  The patient has no leukocytosis or anemia.  The patient does have a lymphocyte predominance therefore a rapid flu will be ordered.  The patient's troponin was found to be elevated at 0.07.  The patient at this time did have another episode of chest pain and therefore nitro was given.  At this time a troponin will be ordered for 2 hours from now.  Plan is to admit the patient.  If the troponin is significantly elevated and the patient may be transferred to [facility].  The patient is a negative chest x-ray.    The patient will be admitted in guarded condition.  The tachycardia was resolved at this point.          FINAL IMPRESSION  1. Chest pain, unspecified type    2. Unstable angina (HCC)              Electronically signed by: Shun Ashley, 5/17/2018

## 2018-05-17 NOTE — ED NOTES
Chief Complaint   Patient presents with   • Chest Pressure   • Irregular Heart Beat     /92   Pulse (!) 130   Resp 20   SpO2 96%

## 2018-05-18 ENCOUNTER — APPOINTMENT (OUTPATIENT)
Dept: RADIOLOGY | Facility: MEDICAL CENTER | Age: 43
End: 2018-05-18
Attending: INTERNAL MEDICINE
Payer: COMMERCIAL

## 2018-05-18 VITALS
DIASTOLIC BLOOD PRESSURE: 64 MMHG | BODY MASS INDEX: 30.41 KG/M2 | RESPIRATION RATE: 18 BRPM | HEIGHT: 64 IN | SYSTOLIC BLOOD PRESSURE: 93 MMHG | WEIGHT: 178.13 LBS | OXYGEN SATURATION: 94 % | TEMPERATURE: 98.4 F | HEART RATE: 85 BPM

## 2018-05-18 LAB
ALBUMIN SERPL BCP-MCNC: 3.7 G/DL (ref 3.2–4.9)
ALP SERPL-CCNC: 68 U/L (ref 30–99)
ALT SERPL-CCNC: 68 U/L (ref 2–50)
ANION GAP SERPL CALC-SCNC: 4 MMOL/L (ref 0–11.9)
AST SERPL-CCNC: 34 U/L (ref 12–45)
BILIRUB CONJ SERPL-MCNC: 0.1 MG/DL (ref 0.1–0.5)
BILIRUB INDIRECT SERPL-MCNC: 0.8 MG/DL (ref 0–1)
BILIRUB SERPL-MCNC: 0.9 MG/DL (ref 0.1–1.5)
BUN SERPL-MCNC: 9 MG/DL (ref 8–22)
CALCIUM SERPL-MCNC: 9.1 MG/DL (ref 8.4–10.2)
CHLORIDE SERPL-SCNC: 108 MMOL/L (ref 96–112)
CHOLEST SERPL-MCNC: 161 MG/DL (ref 100–199)
CO2 SERPL-SCNC: 26 MMOL/L (ref 20–33)
CREAT SERPL-MCNC: 0.98 MG/DL (ref 0.5–1.4)
ERYTHROCYTE [DISTWIDTH] IN BLOOD BY AUTOMATED COUNT: 39.9 FL (ref 35.9–50)
GLUCOSE SERPL-MCNC: 84 MG/DL (ref 65–99)
HCT VFR BLD AUTO: 47.1 % (ref 42–52)
HDLC SERPL-MCNC: 38 MG/DL
HGB BLD-MCNC: 15.9 G/DL (ref 14–18)
LDLC SERPL CALC-MCNC: 98 MG/DL
MAGNESIUM SERPL-MCNC: 2.1 MG/DL (ref 1.5–2.5)
MCH RBC QN AUTO: 31.5 PG (ref 27–33)
MCHC RBC AUTO-ENTMCNC: 33.8 G/DL (ref 33.7–35.3)
MCV RBC AUTO: 93.3 FL (ref 81.4–97.8)
PHOSPHATE SERPL-MCNC: 3.4 MG/DL (ref 2.5–4.5)
PLATELET # BLD AUTO: 217 K/UL (ref 164–446)
PMV BLD AUTO: 9.5 FL (ref 9–12.9)
POTASSIUM SERPL-SCNC: 4.1 MMOL/L (ref 3.6–5.5)
PROT SERPL-MCNC: 7.3 G/DL (ref 6–8.2)
RBC # BLD AUTO: 5.05 M/UL (ref 4.7–6.1)
SODIUM SERPL-SCNC: 138 MMOL/L (ref 135–145)
TRIGL SERPL-MCNC: 124 MG/DL (ref 0–149)
WBC # BLD AUTO: 6.5 K/UL (ref 4.8–10.8)

## 2018-05-18 PROCEDURE — 80076 HEPATIC FUNCTION PANEL: CPT

## 2018-05-18 PROCEDURE — A9502 TC99M TETROFOSMIN: HCPCS

## 2018-05-18 PROCEDURE — 83735 ASSAY OF MAGNESIUM: CPT

## 2018-05-18 PROCEDURE — 80048 BASIC METABOLIC PNL TOTAL CA: CPT

## 2018-05-18 PROCEDURE — 84100 ASSAY OF PHOSPHORUS: CPT

## 2018-05-18 PROCEDURE — G0378 HOSPITAL OBSERVATION PER HR: HCPCS

## 2018-05-18 PROCEDURE — A9270 NON-COVERED ITEM OR SERVICE: HCPCS | Performed by: INTERNAL MEDICINE

## 2018-05-18 PROCEDURE — 80061 LIPID PANEL: CPT

## 2018-05-18 PROCEDURE — 700111 HCHG RX REV CODE 636 W/ 250 OVERRIDE (IP)

## 2018-05-18 PROCEDURE — 700102 HCHG RX REV CODE 250 W/ 637 OVERRIDE(OP): Performed by: INTERNAL MEDICINE

## 2018-05-18 PROCEDURE — 85027 COMPLETE CBC AUTOMATED: CPT

## 2018-05-18 PROCEDURE — 99217 PR OBSERVATION CARE DISCHARGE: CPT | Performed by: HOSPITALIST

## 2018-05-18 RX ORDER — REGADENOSON 0.08 MG/ML
INJECTION, SOLUTION INTRAVENOUS
Status: COMPLETED
Start: 2018-05-18 | End: 2018-05-18

## 2018-05-18 RX ORDER — PROPRANOLOL HYDROCHLORIDE 10 MG/1
5 TABLET ORAL 2 TIMES DAILY
Qty: 60 TAB | Refills: 1 | Status: SHIPPED | OUTPATIENT
Start: 2018-05-18 | End: 2018-06-26 | Stop reason: SDUPTHER

## 2018-05-18 RX ADMIN — REGADENOSON 0.4 MG: 0.08 INJECTION, SOLUTION INTRAVENOUS at 11:01

## 2018-05-18 RX ADMIN — ASPIRIN 325 MG ORAL TABLET 325 MG: 325 PILL ORAL at 08:29

## 2018-05-18 NOTE — CARE PLAN
Problem: Communication  Goal: The ability to communicate needs accurately and effectively will improve  Outcome: PROGRESSING AS EXPECTED  Pt encouraged to verbalize needs.    Problem: Safety  Goal: Will remain free from falls  Outcome: PROGRESSING AS EXPECTED  Pt up self, steady gait, educated on fall risk, non slip socks in place.

## 2018-05-18 NOTE — PROGRESS NOTES
Patient educated re: Pharmacological NM MPI. Nursing goals identified: knowledge deficit, potential for anxiety r/t stress test, potential for compromised cardiac output. Care plan includes educating patient, reassurance and access to ACLS cart/team. Labs and ECG reviewed. Pt denies CP. No caffeine and NPO confirmed. After resting images attained, patient prepped for pharmacological stress. After injection of Lexiscan, patient reported these symptoms: leg heaviness, abdominal distention. Juice provided. Symptoms resolved. .

## 2018-05-18 NOTE — PROGRESS NOTES
Initial assessments done, no new observations noted, pt complaining of palpitations, VS WDL, synus rhythm, in bed resting.

## 2018-05-18 NOTE — PROGRESS NOTES
1910 - Report received from Er, awaiting Pt arrival to [facility].    1923 - Pt arrived to [facility], no distress at this time, will start assessments.

## 2018-05-18 NOTE — DISCHARGE INSTRUCTIONS
Discharge Instructions    Discharged to home by car with relative. Discharged via walking, hospital escort: Yes.  Special equipment needed: Not Applicable    Be sure to schedule a follow-up appointment with your primary care doctor or any specialists as instructed.     Discharge Plan:   Diet Plan: Discussed  Activity Level: Discussed  Confirmed Follow up Appointment: Appointment Scheduled  Confirmed Symptoms Management: Discussed  Medication Reconciliation Updated: Yes  Influenza Vaccine Indication: Not indicated: Previously immunized this influenza season and > 8 years of age    I understand that a diet low in cholesterol, fat, and sodium is recommended for good health. Unless I have been given specific instructions below for another diet, I accept this instruction as my diet prescription.   Other diet: regular    Special Instructions: None    · Is patient discharged on Warfarin / Coumadin?   No     Depression / Suicide Risk    As you are discharged from this [facility] facility, it is important to learn how to keep safe from harming yourself.    Recognize the warning signs:  · Abrupt changes in personality, positive or negative- including increase in energy   · Giving away possessions  · Change in eating patterns- significant weight changes-  positive or negative  · Change in sleeping patterns- unable to sleep or sleeping all the time   · Unwillingness or inability to communicate  · Depression  · Unusual sadness, discouragement and loneliness  · Talk of wanting to die  · Neglect of personal appearance   · Rebelliousness- reckless behavior  · Withdrawal from people/activities they love  · Confusion- inability to concentrate     If you or a loved one observes any of these behaviors or has concerns about self-harm, here's what you can do:  · Talk about it- your feelings and reasons for harming yourself  · Remove any means that you might use to hurt yourself (examples: pills, rope, extension cords, firearm)  · Get  professional help from the community (Mental Health, Substance Abuse, psychological counseling)  · Do not be alone:Call your Safe Contact- someone whom you trust who will be there for you.  · Call your local CRISIS HOTLINE 281-5014 or 766-331-3071  · Call your local Children's Mobile Crisis Response Team [place] (063) 296-3795 or www.[employer]  · Call the toll free National Suicide Prevention Hotlines   · National Suicide Prevention Lifeline 954-173-XEKO (9293)  · [employer] Hope Line Network 800-SUICIDE (946-8616)      Palpitations  Introduction  A palpitation is the feeling that your heart:  Has an uneven (irregular) heartbeat.  Is beating faster than normal.  Is fluttering.  Is skipping a beat.  This is usually not a serious problem. In some cases, you may need more medical tests.  Follow these instructions at home:  Avoid:  Caffeine in coffee, tea, soft drinks, diet pills, and energy drinks.  Chocolate.  Alcohol.  Do not use any tobacco products. These include cigarettes, chewing tobacco, and e-cigarettes. If you need help quitting, ask your doctor.  Try to reduce your stress. These things may help:  Yoga.  Meditation.  Physical activity. Swimming, jogging, and walking are good choices.  A method that helps you use your mind to control things in your body, like heartbeats (biofeedback).  Get plenty of rest and sleep.  Take over-the-counter and prescription medicines only as told by your doctor.  Keep all follow-up visits as told by your doctor. This is important.  Contact a doctor if:  Your heartbeat is still fast or uneven after 24 hours.  Your palpitations occur more often.  Get help right away if:  You have chest pain.  You feel short of breath.  You have a very bad headache.  You feel dizzy.  You pass out (faint).  This information is not intended to replace advice given to you by your health care provider. Make sure you discuss any questions you have with your health care provider.  Document  Released: 09/26/2009 Document Revised: 05/25/2017 Document Reviewed: 09/01/2016  © 2017 [employer]vier

## 2018-05-18 NOTE — ASSESSMENT & PLAN NOTE
I suspect this is related to underlying palpitations  Recurrent ED presentations, evaluations by PCP  Minimal elevated troponin on presentation   Negative stress echocardiogram 01/2017    Admission to telemetry for observation   Monitor on telemetry, cycle troponin's, serial EKGs, CP protocol     Start Metoprolol 25 mg BID  Obtain stress test in am   If no concerns outpatient cardiology f/u for further recommendations  Check A1C, TSH, Lipid profile

## 2018-05-18 NOTE — DISCHARGE SUMMARY
CHIEF COMPLAINT ON ADMISSION  Chief Complaint   Patient presents with   • Chest Pressure   • Irregular Heart Beat       CODE STATUS  Prior    HPI & HOSPITAL COURSE  This is a 42 y.o. male here with chest pain and palpitations. He was admitted and monitored. He was ruled out for a myocardial infarction with serial troponins and had a normal stress test. He has been having ectopy frequently with pvc/pac morphology noted on tele. He will be started on a trial of betablocker therapy at a very low dose as he has low BP at baseline. He will also be set with cardiology for a follow up appointment. Diet and exercise were discussed with relation to this issue as well. He will avoid stimulants, try magnesium and meditation.         Therefore, he is discharged in good and stable condition with close outpatient follow-up.    SPECIFIC OUTPATIENT FOLLOW-UP  Cards/pcp    DISCHARGE PROBLEM LIST  Active Problems:    Other chest pain POA: Yes    Palpitations POA: Yes    Hypokalemia POA: Yes    Anxiety POA: Yes    Transaminitis POA: Yes  Resolved Problems:    * No resolved hospital problems. *      FOLLOW UP  Future Appointments  Date Time Provider Department Center   5/21/2018 3:40 PM CARE MANAGER [facility]   5/23/2018 3:00 PM Delaney Mcpherson M.D. [facility]   6/8/2018 12:40 PM CATIE [facility] None     Jenny Frye M.D.  [address]  [facility] [id number]  [phone]            MEDICATIONS ON DISCHARGE   Lucas Simmons   Home Medication Instructions ROSELINE:[id number]    Printed on:05/18/18 8768   Medication Information                      propranolol (INDERAL) 10 MG Tab  Take 0.5 Tabs by mouth 2 times a day.                 DIET  No orders of the defined types were placed in this encounter.      ACTIVITY  As tolerated.  Weight bearing as tolerated      CONSULTATIONS  none    PROCEDURES  none    LABORATORY  Lab Results   Component Value Date/Time    SODIUM 138 05/18/2018 05:17 AM    POTASSIUM 4.1 05/18/2018  05:17 AM    CHLORIDE 108 05/18/2018 05:17 AM    CO2 26 05/18/2018 05:17 AM    GLUCOSE 84 05/18/2018 05:17 AM    BUN 9 05/18/2018 05:17 AM    CREATININE 0.98 05/18/2018 05:17 AM        Lab Results   Component Value Date/Time    WBC 6.5 05/18/2018 05:17 AM    HEMOGLOBIN 15.9 05/18/2018 05:17 AM    HEMATOCRIT 47.1 05/18/2018 05:17 AM    PLATELETCT 217 05/18/2018 05:17 AM        Total time of the discharge process exceeds 36 minutes

## 2018-05-18 NOTE — H&P
Hospital Medicine History and Physical    Date of Service  5/17/2018    Chief Complaint  Chief Complaint   Patient presents with   • Chest Pressure   • Irregular Heart Beat       History of Presenting Illness  42 y.o. male who presented 5/17/2018 with Chest pain and palpitations. Patient works as an laboratory technician. He has had prior recurrent ER visits for chest pain and palpitations and outpatient evaluation of chest pain and palpitations and anxiety with his primary care physician and cardiology team. He in the past is noted to have a history of premature atrial contractions and premature ventricular contractions. He has had a negative echocardiogram and a stress echo in January 2017. Today while at work he noticed that he developed palpitations and chest pain. Subsequently he ran his own troponin and BNP and found these to be elevated and presented to the emergency room for further evaluation. Upon evaluation patient reports that he has had chest pain intermittently since Tuesday night with intermittent palpitations. He reports that his chest pain is substernal, left-sided in location. He characterizes this as a heaviness. It is 2-3 out of 10 in intensity. He notices radiation towards his left arm and neck area. He hasn't noticed any improving or worsening factors of this pain. Reports intermittent nausea with these symptoms but no diaphoresis or vomiting. Associated palpitations with these symptoms. No fever or chills. Some shortness of breath with these symptoms. No other complaints per patient. Otherwise denies any personal history of coronary artery disease. Denies smoking. No family history of coronary artery disease. No personal history of DVT or PE     Primary Care Physician  Jenny Frye M.D.    Consultants  None    Code Status  FULL CODE     Review of Systems  Review of Systems   Constitutional: Positive for malaise/fatigue. Negative for chills, diaphoresis, fever and weight loss.   HENT:  Negative for hearing loss and tinnitus.    Eyes: Negative for blurred vision and double vision.   Respiratory: Positive for shortness of breath. Negative for cough, hemoptysis, sputum production and wheezing.    Cardiovascular: Positive for chest pain and palpitations. Negative for orthopnea, claudication, leg swelling and PND.   Gastrointestinal: Positive for nausea. Negative for abdominal pain, blood in stool, constipation, diarrhea, heartburn, melena and vomiting.   Genitourinary: Negative for dysuria, flank pain, frequency, hematuria and urgency.   Musculoskeletal: Negative for back pain, falls, joint pain, myalgias and neck pain.   Skin: Negative for itching and rash.   Neurological: Positive for dizziness and weakness. Negative for tingling, tremors, sensory change, speech change, focal weakness, seizures, loss of consciousness and headaches.   Psychiatric/Behavioral: Negative for depression, hallucinations, memory loss, substance abuse and suicidal ideas. The patient is nervous/anxious and has insomnia.         Past Medical History  Past Medical History:   Diagnosis Date   • Arrhythmia    Recurrent chest pain    Surgical History  No prior surgeries per patient     Medications  No current facility-administered medications on file prior to encounter.      No current outpatient prescriptions on file prior to encounter.       Family History  Family History   Problem Relation Age of Onset   • Hypertension Mother    • Cancer Father      liver cancer    • Alcohol/Drug Father    • Arthritis Sister    • Arthritis Brother    • Arthritis Brother    • Arthritis Brother        Social History  Social History   Substance Use Topics   • Smoking status: Never Smoker   • Smokeless tobacco: Never Used   • Alcohol use 0.0 oz/week      Comment: occasional       Allergies  No Known Allergies     Physical Exam  Laboratory   Hemodynamics  Temp (24hrs), Av.9 °C (98.4 °F), Min:36.9 °C (98.4 °F), Max:36.9 °C (98.4 °F)    Temperature: 36.9 °C (98.4 °F)  Pulse  Av.5  Min: 81  Max: 130 Heart Rate (Monitored): 79  Blood Pressure: 133/92, NIBP: 110/84      Respiratory      Respiration: 19, Pulse Oximetry: 96 %             Physical Exam   Constitutional: He is oriented to person, place, and time. He appears well-developed and well-nourished. No distress.   HENT:   Head: Normocephalic.   Mouth/Throat: Oropharynx is clear and moist. No oropharyngeal exudate.   Eyes: Conjunctivae and EOM are normal. Pupils are equal, round, and reactive to light. No scleral icterus.   Neck: Normal range of motion. No JVD present. No thyromegaly present.   Cardiovascular: Normal rate, regular rhythm and normal heart sounds.  Exam reveals no gallop and no friction rub.    No murmur heard.  Pulses:       Posterior tibial pulses are 2+ on the right side, and 2+ on the left side.   Cap refill < 3s   Pulmonary/Chest: Effort normal and breath sounds normal. No stridor. No respiratory distress. He has no wheezes. He has no rales. He exhibits no tenderness.   Abdominal: Soft. Bowel sounds are normal. He exhibits no distension. There is no tenderness. There is no rebound and no guarding.   Musculoskeletal: He exhibits no edema, tenderness or deformity.   Neurological: He is alert and oriented to person, place, and time. He has normal reflexes. No cranial nerve deficit.   Skin: Skin is warm and dry. He is not diaphoretic.   Psychiatric: He has a normal mood and affect. His behavior is normal. Judgment and thought content normal.       Recent Labs      18   1535   WBC  8.8   RBC  5.01   HEMOGLOBIN  15.9   HEMATOCRIT  46.4   MCV  92.6   MCH  31.7   MCHC  34.3   RDW  39.8   PLATELETCT  229   MPV  9.4     Recent Labs      18   0430  18   1535   SODIUM  138  136   POTASSIUM  4.0  3.2*   CHLORIDE  106  106   CO2  29  24   GLUCOSE  101*  100*   BUN  10  12   CREATININE  0.92  0.99   CALCIUM  9.0  9.0     Recent Labs      18   0430  18    1535   ALTSGPT  81*   --    ASTSGOT  37   --    ALKPHOSPHAT  79   --    TBILIRUBIN  1.1   --    GLUCOSE  101*  100*         Recent Labs      05/17/18   1535   BNPBTYPENAT  15     Recent Labs      05/17/18   0430   TRIGLYCERIDE  195*   HDL  38*   LDL  104*     Lab Results   Component Value Date    TROPONINI 0.05 (H) 05/17/2018     Urinalysis:    Lab Results  Component Value Date/Time   SPECGRAVITY <=1.005 12/13/2017 2200   GLUCOSEUR Negative 12/13/2017 2200   KETONES Negative 12/13/2017 2200   NITRITE Negative 12/13/2017 2200        Imaging  Reviewed   Assessment/Plan     I anticipate this patient is appropriate for observation status at this time.    Other chest pain- (present on admission)   Assessment & Plan    I suspect this is related to underlying palpitations  Recurrent ED presentations, evaluations by PCP  Minimal elevated troponin on presentation   Negative stress echocardiogram 01/2017    Admission to telemetry for observation   Monitor on telemetry, cycle troponin's, serial EKGs, CP protocol     Start Metoprolol 25 mg BID  Obtain stress test in am   If no concerns outpatient cardiology f/u for further recommendations  Check A1C, TSH, Lipid profile         Palpitations- (present on admission)   Assessment & Plan    Prior history of PACs / PVCs  Likely contributing to recurrent chest pain  Will start patient on metoprolol 25 mg BID  Monitor on telemetry  If no events, Recommend outpatient f/u with cardiology team for further recommendations         Transaminitis- (present on admission)   Assessment & Plan    ALT elevated since January  Will check Hepatitis panel  Repeat LFTs in am tomorrow   Consider outpatient US liver with PCP         Anxiety- (present on admission)   Assessment & Plan    Counseled and educated         Hypokalemia- (present on admission)   Assessment & Plan    Replaced  Check Mg  Repeat BMP in am tomorrow            VTE prophylaxis: SCD and SC Lovenox.

## 2018-05-18 NOTE — ASSESSMENT & PLAN NOTE
ALT elevated since January  Will check Hepatitis panel  Repeat LFTs in am tomorrow   Consider outpatient US liver with PCP

## 2018-05-18 NOTE — ASSESSMENT & PLAN NOTE
Prior history of PACs / PVCs  Likely contributing to recurrent chest pain  Will start patient on metoprolol 25 mg BID  Monitor on telemetry  If no events, Recommend outpatient f/u with cardiology team for further recommendations

## 2018-05-21 ENCOUNTER — PATIENT OUTREACH (OUTPATIENT)
Dept: HEALTH INFORMATION MANAGEMENT | Facility: OTHER | Age: 43
End: 2018-05-21

## 2018-06-25 NOTE — PROGRESS NOTES
Chief Complaint   Patient presents with   • Hospital Follow-up     Medication possible side effect with propranolol, dizziness. Still having left side chest and left arm discomfort when tired.       Subjective:   Lucas Simmons is a 42 y.o. male who presents today for follow up regarding his frequent PVC's and PAC's. Patient was recently admitted on 5/18/18 with chest pain and palpitations, patient had normal serial troponin's and a negative stress test while hospitalized. It was noted that the patient was having frequent ectopy with PVC's and PAC's, so low does propanolol 0.5 mg BID was initiated.     Past medical history significant for palpitations and anxiety.    Patient works full time as a .     Today patient states that she is feeling better. States that the propanolol has greatly helped his palpitations.  Patient has been able to secure a consistent work schedule which has also helped with his stress levels as well.    Patient continues to have some mid-upper gastric discomfort, states that it feels that it is due to his acid reflux.  Denies chest pain, palpitations, shortness of breath or lower extremity edema.      Past Medical History:   Diagnosis Date   • Arrhythmia      History reviewed. No pertinent surgical history.  Family History   Problem Relation Age of Onset   • Hypertension Mother    • Cancer Father      liver cancer    • Alcohol/Drug Father    • Arthritis Sister    • Arthritis Brother    • Arthritis Brother    • Arthritis Brother      Social History     Social History   • Marital status:      Spouse name: N/A   • Number of children: 2   • Years of education: N/A     Occupational History   • [employer]      Social History Main Topics   • Smoking status: Never Smoker   • Smokeless tobacco: Never Used   • Alcohol use 0.0 oz/week      Comment: occasional   • Drug use: No   • Sexual activity: Yes     Partners: Male     Other Topics Concern   • Not on file     Social  "History Narrative   • No narrative on file     No Known Allergies  Outpatient Encounter Prescriptions as of 6/26/2018   Medication Sig Dispense Refill   • Probiotic Product (PROBIOTIC PO) Take 1 Each by mouth every day.     • propranolol (INDERAL) 10 MG Tab Take 0.5 Tabs by mouth 2 times a day. 60 Tab 3   • [DISCONTINUED] propranolol (INDERAL) 10 MG Tab Take 0.5 Tabs by mouth 2 times a day. 60 Tab 1     No facility-administered encounter medications on file as of 6/26/2018.      Review of Systems   Constitutional: Negative for malaise/fatigue and weight loss.   Respiratory: Negative for shortness of breath.    Cardiovascular: Negative for chest pain, palpitations, orthopnea, claudication, leg swelling and PND.   Gastrointestinal: Positive for abdominal pain (mid upper gastric pressure. ) and heartburn.   Neurological: Positive for dizziness (Occasional). Negative for sensory change, speech change, loss of consciousness and weakness.   All other systems reviewed and are negative.       Objective:   /80   Pulse 84   Resp 12   Ht 1.626 m (5' 4\")   Wt 81.6 kg (180 lb)   SpO2 93%   BMI 30.90 kg/m²     Physical Exam   Constitutional: He is oriented to person, place, and time. He appears well-developed and well-nourished. No distress.   HENT:   Head: Normocephalic.   Eyes: EOM are normal.   Neck: No JVD present.   Cardiovascular: Normal rate and regular rhythm.    Pulmonary/Chest: Effort normal and breath sounds normal.   Abdominal: Soft. There is no tenderness.   Musculoskeletal: He exhibits no edema.   Neurological: He is alert and oriented to person, place, and time.   Skin: Skin is warm and dry.   Psychiatric: He has a normal mood and affect. His behavior is normal.         NM-Cardiac Stress Test 5/18/18:   * No evidence of significant jeopardized viable myocardium or prior    myocardial infarction.   * Equivocal wall motion abnormality in the basal, mid, and apical    inferoseptal and anteroseptal walls. " Consider echocardiogram for    confirmation. There is no perfusion defect in this area   * LV ejection fraction = 69%.    TTE 1/5/17:  No prior study is available for comparison.   Normal left ventricular systolic function.   Normal right ventricular systolic function.   No evidence of valvular abnormality based on Doppler evaluation.     Assessment:     1. PVC (premature ventricular contraction)     2. Premature atrial complex     3. Atypical chest pain         Medical Decision Making:  Today's Assessment / Status / Plan:     Palpitations:  -Continue propanolol 5 mg BID.  -BP is stable here in office today.  -Discussed with patient the option of using propanolol as a as needed medication for palpitations.   -Patient has had some episodes of intermittent dizziness in the afternoon, discussed with patient the importance of staying hydrated.    Atypical chest pain:  -Denies chest pain at rest or with exertion.  -Nuc med stress test on 5/18/18 showing no evidence of jeopardized myocardium or prior myocardial infarction.  -Patient states that he does have some mid epigastric discomfort/pressure, encouraged patient to follow up with primary for his GERD like symptoms.    Patient overall is feeling much better.  Patient will follow up with me in 3 months or earlier as needed.  Encouraged patient to contact our office should any questions or concerns arise.  Patient understands and agrees the plan of care.    Collaborating Provider: Dr. Bee Barrios

## 2018-06-26 ENCOUNTER — OFFICE VISIT (OUTPATIENT)
Dept: CARDIOLOGY | Facility: MEDICAL CENTER | Age: 43
End: 2018-06-26
Payer: COMMERCIAL

## 2018-06-26 ENCOUNTER — TELEPHONE (OUTPATIENT)
Dept: CARDIOLOGY | Facility: MEDICAL CENTER | Age: 43
End: 2018-06-26

## 2018-06-26 ENCOUNTER — HOSPITAL ENCOUNTER (OUTPATIENT)
Facility: MEDICAL CENTER | Age: 43
End: 2018-06-26
Payer: COMMERCIAL

## 2018-06-26 ENCOUNTER — HOSPITAL ENCOUNTER (OUTPATIENT)
Dept: LAB | Facility: MEDICAL CENTER | Age: 43
End: 2018-06-26
Payer: COMMERCIAL

## 2018-06-26 VITALS
HEART RATE: 84 BPM | WEIGHT: 180 LBS | BODY MASS INDEX: 30.73 KG/M2 | OXYGEN SATURATION: 93 % | SYSTOLIC BLOOD PRESSURE: 100 MMHG | RESPIRATION RATE: 12 BRPM | DIASTOLIC BLOOD PRESSURE: 80 MMHG | HEIGHT: 64 IN

## 2018-06-26 DIAGNOSIS — R07.89 ATYPICAL CHEST PAIN: ICD-10-CM

## 2018-06-26 DIAGNOSIS — I49.1 PREMATURE ATRIAL COMPLEX: ICD-10-CM

## 2018-06-26 DIAGNOSIS — I49.3 PVC (PREMATURE VENTRICULAR CONTRACTION): ICD-10-CM

## 2018-06-26 LAB
BDY FAT % MEASURED: 31.7 %
BP DIAS: 75 MMHG
BP SYS: 110 MMHG
CHOLEST SERPL-MCNC: 166 MG/DL (ref 100–199)
DIABETES HTDIA: NO
EVENT NAME HTEVT: NORMAL
FASTING HTFAS: YES
GLUCOSE SERPL-MCNC: 84 MG/DL (ref 65–99)
HDLC SERPL-MCNC: 39 MG/DL
HYPERTENSION HTHYP: NO
LDLC SERPL CALC-MCNC: 85 MG/DL
SCREENING LOC CITY HTCIT: NORMAL
SCREENING LOC STATE HTSTA: NORMAL
SCREENING LOCATION HTLOC: NORMAL
SMOKING HTSMO: NO
SUBSCRIBER ID HTSID: NORMAL
TRIGL SERPL-MCNC: 209 MG/DL (ref 0–149)

## 2018-06-26 PROCEDURE — 36415 COLL VENOUS BLD VENIPUNCTURE: CPT

## 2018-06-26 PROCEDURE — 99214 OFFICE O/P EST MOD 30 MIN: CPT | Performed by: NURSE PRACTITIONER

## 2018-06-26 PROCEDURE — 82947 ASSAY GLUCOSE BLOOD QUANT: CPT

## 2018-06-26 PROCEDURE — 80061 LIPID PANEL: CPT

## 2018-06-26 PROCEDURE — S5190 WELLNESS ASSESSMENT BY NONPH: HCPCS

## 2018-06-26 RX ORDER — PROPRANOLOL HYDROCHLORIDE 10 MG/1
5 TABLET ORAL 2 TIMES DAILY
Qty: 60 TAB | Refills: 3 | Status: SHIPPED | OUTPATIENT
Start: 2018-06-26 | End: 2019-03-01

## 2018-06-26 ASSESSMENT — ENCOUNTER SYMPTOMS
ORTHOPNEA: 0
WEIGHT LOSS: 0
SPEECH CHANGE: 0
WEAKNESS: 0
ABDOMINAL PAIN: 1
PND: 0
DIZZINESS: 1
PALPITATIONS: 0
CLAUDICATION: 0
LOSS OF CONSCIOUSNESS: 0
SENSORY CHANGE: 0
SHORTNESS OF BREATH: 0
HEARTBURN: 1

## 2018-07-23 ENCOUNTER — OFFICE VISIT (OUTPATIENT)
Dept: MEDICAL GROUP | Facility: PHYSICIAN GROUP | Age: 43
End: 2018-07-23
Payer: COMMERCIAL

## 2018-07-23 VITALS
SYSTOLIC BLOOD PRESSURE: 90 MMHG | OXYGEN SATURATION: 96 % | WEIGHT: 181 LBS | HEIGHT: 64 IN | BODY MASS INDEX: 30.9 KG/M2 | TEMPERATURE: 98 F | HEART RATE: 83 BPM | DIASTOLIC BLOOD PRESSURE: 70 MMHG

## 2018-07-23 DIAGNOSIS — A04.8 H. PYLORI INFECTION: ICD-10-CM

## 2018-07-23 DIAGNOSIS — E66.9 OBESITY (BMI 30-39.9): ICD-10-CM

## 2018-07-23 DIAGNOSIS — I49.3 PVC (PREMATURE VENTRICULAR CONTRACTION): ICD-10-CM

## 2018-07-23 DIAGNOSIS — R10.13 EPIGASTRIC ABDOMINAL PAIN: ICD-10-CM

## 2018-07-23 DIAGNOSIS — I49.1 PREMATURE ATRIAL COMPLEX: ICD-10-CM

## 2018-07-23 DIAGNOSIS — R10.2 SUPRAPUBIC ABDOMINAL PAIN: ICD-10-CM

## 2018-07-23 DIAGNOSIS — K62.5 RECTAL BLEEDING: ICD-10-CM

## 2018-07-23 LAB
APPEARANCE UR: CLEAR
BILIRUB UR STRIP-MCNC: NORMAL MG/DL
COLOR UR AUTO: NORMAL
GLUCOSE UR STRIP.AUTO-MCNC: NORMAL MG/DL
KETONES UR STRIP.AUTO-MCNC: NORMAL MG/DL
LEUKOCYTE ESTERASE UR QL STRIP.AUTO: NORMAL
NITRITE UR QL STRIP.AUTO: NORMAL
PH UR STRIP.AUTO: 6 [PH] (ref 5–8)
PROT UR QL STRIP: NORMAL MG/DL
RBC UR QL AUTO: NORMAL
SP GR UR STRIP.AUTO: 1
UROBILINOGEN UR STRIP-MCNC: 0.2 MG/DL

## 2018-07-23 PROCEDURE — 81002 URINALYSIS NONAUTO W/O SCOPE: CPT | Performed by: FAMILY MEDICINE

## 2018-07-23 PROCEDURE — 99214 OFFICE O/P EST MOD 30 MIN: CPT | Performed by: FAMILY MEDICINE

## 2018-07-23 RX ORDER — OMEPRAZOLE 20 MG/1
20 CAPSULE, DELAYED RELEASE ORAL DAILY
Qty: 30 CAP | Refills: 2 | Status: SHIPPED | OUTPATIENT
Start: 2018-07-23 | End: 2019-03-01

## 2018-07-23 NOTE — LETTER
[facility]  Jenny Frye M.D.  [address] [place]  [place] [address]  Fax: [phone]   Authorization for Release/Disclosure of   Protected Health Information   Name: LUCAS SIMMONS : 1975 SSN: [id number]   Address: [address] [place] [address] [place] [address] Phone:    [phone] (home)    I authorize the entity listed below to release/disclose the PHI below to:   [facility]/Jenny Frye M.D. and Jenny Frye M.D.   Provider or Entity Name:     Address   City, State, Zip   Phone:      Fax:     Reason for request: continuity of care   Information to be released:    [ x ] LAST COLONOSCOPY,  including any PATH REPORT and follow-up  [  ] LAST FIT/COLOGUARD RESULT [  ] LAST DEXA  [  ] LAST MAMMOGRAM  [  ] LAST PAP  [  ] LAST LABS [  ] RETINA EXAM REPORT  [  ] IMMUNIZATION RECORDS  [  ] Release all info      [  ] Check here and initial the line next to each item to release ALL health information INCLUDING  _____ Care and treatment for drug and / or alcohol abuse  _____ HIV testing, infection status, or AIDS  _____ Genetic Testing    DATES OF SERVICE OR TIME PERIOD TO BE DISCLOSED: _____________  I understand and acknowledge that:  * This Authorization may be revoked at any time by you in writing, except if your health information has already been used or disclosed.  * Your health information that will be used or disclosed as a result of you signing this authorization could be re-disclosed by the recipient. If this occurs, your re-disclosed health information may no longer be protected by State or Federal laws.  * You may refuse to sign this Authorization. Your refusal will not affect your ability to obtain treatment.  * This Authorization becomes effective upon signing and will  on (date) __________.      If no date is indicated, this Authorization will  one (1) year from the signature date.    Name: Lucas Simmons    Signature:   Date:     2018       PLEASE FAX REQUESTED RECORDS BACK  TO: [phone]

## 2018-07-24 NOTE — PROGRESS NOTES
Subjective:      Lucas Simmons is a 42 y.o. male who presents with Pain (chest)            HPI     Patient is here for follow-up of palpitations as well as for multiple concerns.    He was admitted at [facility] in May 2018 for palpitations and chest pain. MI was ruled out. Stress test was negative for ischemia or infarction. He was found to have PACs and PVCs. He was started on propranolol 10 mg 0.5 mg twice daily. He responded to the medication and he said he has not had any palpitations since then. He was seen for follow-up by the cardiology clinic last month and he was instructed to continue taking the propranolol with option to take it on an as needed basis. He said that he has cut back on the dose to only half a tablet once daily and so far his palpitations have remained under control. He said he has seen low blood sugar readings on the propranolol.    He also continues to have epigastric pain which he said he notices in the afternoon towards the and of his work shift. This is not associated with nausea, vomiting, disturbances in urination or bowel movement. He had H. pylori infection in January 2018 which was treated with Prevpac. He said he took omeprazole for 14 days thereafter. He has not been taking any PPI at this time.    He had left flank pain which resolved on its own. When we did his urine dipstick at that time he was negative. He said the left flank pain has moved to the suprapubic area recently. He denies any frequency, urgency, decreased urine stream or problem with the urine flow. He denies any blood in the urine. He said he will check his urine dipstick recently and it was negative for blood and 0-2 RBCs on microscopic exam. Patient is a medical technologist at the lab.    He said he also has on and off rectal bleeding associated with constipation. This happens about once a month sometimes twice a month. He said he already had a colonoscopy when [place] was still [place]  [place] 5 [place] which was normal. He said when he was in the [facility] the colonoscopy was repeated which was 3 months after the first one and he was told he had anal fissures and he was given a cream which she was using topically with good results.    Past medical history, past surgical history, family history reviewed-no changes    Social history reviewed-no changes    Allergies reviewed-no changes    Medications reviewed-no changes        ROS     As per history of present illness, the rest are negative.       Objective:     BP (!) 80/60   Pulse 83   Temp 36.7 °C (98 °F)   Ht 1.626 m (5' 4\")   Wt 82.1 kg (181 lb)   SpO2 96%   BMI 31.07 kg/m²      Physical Exam     Examined alert, awake, oriented, not in distress    Neck-supple, no lymphadenopathy, no thyromegaly  Lungs-clear to auscultation, no rales, no wheezes  Heart-regular rate and rhythm, no murmur  Abdomen-good bowel sounds, soft, nontender, no hepatosplenomegaly, no masses, no CVA tenderness  Extremities-no edema, clubbing, cyanosis       Urine dipstick-within normal limits     Assessment/Plan:     1. PVC (premature ventricular contraction)  Good results with propranolol. He was able to decrease the dose to 10 mg half a tablet once daily only good control of his palpitations.    2. Premature atrial complex  Same as #1.    3. H. pylori infection  I will check H. pylori stool antigen to see if we have cleared the infection completely with the Prevpac that he took in January 2018.  - H.PYLORI STOOL ANTIGEN; Future    4. Epigastric abdominal pain  I will have him take omeprazole 20 mg one tablet daily before breakfast every morning for the next 8 weeks and reevaluate thereafter. If he continues to be symptomatic I will refer to the gastroenterologist.    5. Suprapubic abdominal pain  Urine dipstick within normal limits without any abnormality. Advised proper hydration with adequate amounts of water intake because this could be related to dehydration. "   - POCT Urinalysis    6. Rectal bleeding  He had already colonoscopy about 5 years ago the [place] normal and then 3 months after that in the [facility] and he was told he has anal fissures. Advised increase by mouth fluids and fiber intake to avoid constipation. Advised increase water intake. May use over-the-counter Preparation H as needed for now. We will keep an eye on this and if this continues to be a problem consider referral to GI specialist.    7. Obesity (BMI 30-39.9)  Discussed diet, exercise and weight loss.  - Patient identified as having weight management issue.  Appropriate orders and counseling given.    Follow-up in 2 months.

## 2018-08-23 ENCOUNTER — HOSPITAL ENCOUNTER (OUTPATIENT)
Facility: MEDICAL CENTER | Age: 43
End: 2018-08-23
Attending: FAMILY MEDICINE
Payer: COMMERCIAL

## 2018-08-23 ENCOUNTER — APPOINTMENT (OUTPATIENT)
Dept: LAB | Facility: MEDICAL CENTER | Age: 43
End: 2018-08-23
Payer: COMMERCIAL

## 2018-08-23 DIAGNOSIS — A04.8 H. PYLORI INFECTION: ICD-10-CM

## 2018-08-23 LAB — H PYLORI AG STL QL IA: NOT DETECTED

## 2018-08-23 PROCEDURE — 87338 HPYLORI STOOL AG IA: CPT

## 2018-08-25 ENCOUNTER — HOSPITAL ENCOUNTER (OUTPATIENT)
Facility: MEDICAL CENTER | Age: 43
End: 2018-08-25
Attending: PREVENTIVE MEDICINE
Payer: COMMERCIAL

## 2018-08-25 ENCOUNTER — EH NON-PROVIDER (OUTPATIENT)
Dept: OCCUPATIONAL MEDICINE | Facility: CLINIC | Age: 43
End: 2018-08-25

## 2018-08-25 DIAGNOSIS — Z02.89 HEALTH EXAMINATION OF DEFINED SUBPOPULATION: ICD-10-CM

## 2018-08-25 DIAGNOSIS — Z02.89 ENCOUNTER FOR OCCUPATIONAL HEALTH EXAMINATION: ICD-10-CM

## 2018-08-25 PROCEDURE — 86480 TB TEST CELL IMMUN MEASURE: CPT | Performed by: PREVENTIVE MEDICINE

## 2018-08-26 LAB
M TB TUBERC IFN-G BLD QL: NEGATIVE
M TB TUBERC IFN-G/MITOGEN IGNF BLD: 0.07
M TB TUBERC IGNF/MITOGEN IGNF CONTROL: 44.28 [IU]/ML
MITOGEN IGNF BCKGRD COR BLD-ACNC: 0.23 [IU]/ML

## 2018-08-31 ENCOUNTER — DOCUMENTATION (OUTPATIENT)
Dept: OCCUPATIONAL MEDICINE | Facility: CLINIC | Age: 43
End: 2018-08-31

## 2018-09-13 ENCOUNTER — EH NON-PROVIDER (OUTPATIENT)
Dept: OCCUPATIONAL MEDICINE | Facility: CLINIC | Age: 43
End: 2018-09-13

## 2018-09-13 DIAGNOSIS — Z02.89 ENCOUNTER FOR OCCUPATIONAL HEALTH EXAMINATION INVOLVING RESPIRATOR: ICD-10-CM

## 2018-09-13 PROCEDURE — 94375 RESPIRATORY FLOW VOLUME LOOP: CPT | Performed by: PREVENTIVE MEDICINE

## 2018-10-04 ENCOUNTER — IMMUNIZATION (OUTPATIENT)
Dept: OCCUPATIONAL MEDICINE | Facility: CLINIC | Age: 43
End: 2018-10-04

## 2018-10-04 DIAGNOSIS — Z23 NEED FOR VACCINATION: ICD-10-CM

## 2018-10-08 ENCOUNTER — APPOINTMENT (OUTPATIENT)
Dept: MEDICAL GROUP | Facility: PHYSICIAN GROUP | Age: 43
End: 2018-10-08
Payer: COMMERCIAL

## 2018-10-13 PROCEDURE — 90686 IIV4 VACC NO PRSV 0.5 ML IM: CPT | Performed by: PREVENTIVE MEDICINE

## 2018-11-29 ENCOUNTER — OFFICE VISIT (OUTPATIENT)
Dept: MEDICAL GROUP | Facility: PHYSICIAN GROUP | Age: 43
End: 2018-11-29
Payer: COMMERCIAL

## 2018-11-29 VITALS
OXYGEN SATURATION: 93 % | WEIGHT: 180.34 LBS | DIASTOLIC BLOOD PRESSURE: 70 MMHG | TEMPERATURE: 97.8 F | HEIGHT: 64 IN | BODY MASS INDEX: 30.79 KG/M2 | HEART RATE: 67 BPM | SYSTOLIC BLOOD PRESSURE: 100 MMHG

## 2018-11-29 DIAGNOSIS — I49.1 PAC (PREMATURE ATRIAL CONTRACTION): ICD-10-CM

## 2018-11-29 DIAGNOSIS — I49.3 PVC (PREMATURE VENTRICULAR CONTRACTION): ICD-10-CM

## 2018-11-29 DIAGNOSIS — R10.13 EPIGASTRIC PAIN: ICD-10-CM

## 2018-11-29 PROCEDURE — 99213 OFFICE O/P EST LOW 20 MIN: CPT | Performed by: FAMILY MEDICINE

## 2018-11-30 NOTE — PROGRESS NOTES
"Subjective:      Lucas Simmons is a 43 y.o. male who presents with Epigastric Pain            HPI      Patient returns for follow-up of his medical problems.    For his PVCs and PACs, he takes the propranolol 10 mg half a tablet twice a day regularly.  He said if he skips a dose he starts to feel the palpitations.  He continues to have mild discomfort in the left upper chest and fluttering in this area.  He is still worried this could be something serious.  He has been already evaluated extensively by the cardiologist including stress echo and nuclear stress test that showed normal heart function without any ischemia or infarction.  The discomfort that he is spearing seeing is probably the palpitations from his PVCs and PACs.  His blood pressure tends to run low on the Inderal but he denies any dizziness or lightheadedness.    We put him on omeprazole for epigastric pain.  He took it for about 3 months and then he stopped taking it about a month ago.  He said he it took care of the epigastric pain.  He had H. pylori infection in January 2018 which was treated with Prevpac.  We had him do an H. pylori test to see if this has been eradicated.  Result came back negative.  He said occasionally he gets mild discomfort in the epigastric area which is not even close to the pain that he had before.    Past medical history, past surgical history, family history reviewed-no changes    Social history reviewed-no changes    Allergies reviewed-no changes    Medications reviewed-no changes      ROS     As per HPI, the rest are negative.       Objective:     /70   Pulse 67   Temp 36.6 °C (97.8 °F) (Temporal)   Ht 1.626 m (5' 4\")   Wt 81.8 kg (180 lb 5.4 oz)   SpO2 93%   BMI 30.95 kg/m²      Physical Exam     Examined alert, awake, oriented, not in distress    Neck-supple, no lymphadenopathy, no thyromegaly  Lungs-clear to auscultation, no rales, no wheezes  Heart-regular rate and rhythm, no irregular rhythm, no " murmur  Extremities-no edema, clubbing, cyanosis            Assessment/Plan:     1. PVC (premature ventricular contraction)  Continue Inderal.  As long as the blood pressure does not run below 90 and he has no symptoms he should continue to take the Inderal because this controls his symptoms.  The left upper chest discomfort and fluttering are probably equivalent to symptoms of palpitations and I reassured him this is not due to CAD/anginal pain since he has been already fully worked up with negative results.    2. PAC (premature atrial contraction)  Same as #1.    3. Epigastric pain  I told the patient he can take the omeprazole on as-needed basis for epigastric discomfort.  Continue to avoid caffeine, alcohol and cigarettes.  Patient never smoked cigarettes.      Please note that this dictation was created using voice recognition software. I have worked with consultants from the vendor as well as technical experts from [facility]  [employer] to optimize the interface. I have made every reasonable attempt to correct obvious errors, but I expect that there are errors of grammar and possibly content I did not discover before finalizing the note.

## 2019-02-26 ENCOUNTER — OFFICE VISIT (OUTPATIENT)
Dept: URGENT CARE | Facility: MEDICAL CENTER | Age: 44
End: 2019-02-26
Payer: COMMERCIAL

## 2019-02-26 VITALS
OXYGEN SATURATION: 96 % | HEART RATE: 105 BPM | SYSTOLIC BLOOD PRESSURE: 102 MMHG | DIASTOLIC BLOOD PRESSURE: 62 MMHG | TEMPERATURE: 99.2 F

## 2019-02-26 DIAGNOSIS — B34.9 ACUTE VIRAL SYNDROME: ICD-10-CM

## 2019-02-26 DIAGNOSIS — R05.9 COUGH: ICD-10-CM

## 2019-02-26 PROCEDURE — 99214 OFFICE O/P EST MOD 30 MIN: CPT | Performed by: PHYSICIAN ASSISTANT

## 2019-02-26 RX ORDER — BENZONATATE 100 MG/1
200 CAPSULE ORAL 3 TIMES DAILY PRN
Qty: 60 CAP | Refills: 0 | Status: SHIPPED | OUTPATIENT
Start: 2019-02-26 | End: 2021-01-05

## 2019-02-26 ASSESSMENT — ENCOUNTER SYMPTOMS
DIARRHEA: 0
CHILLS: 1
VOMITING: 0
NAUSEA: 0
FEVER: 1
COUGH: 1
WHEEZING: 0
SHORTNESS OF BREATH: 0
MYALGIAS: 1
HEADACHES: 1
SORE THROAT: 1
SPUTUM PRODUCTION: 0

## 2019-02-27 NOTE — PROGRESS NOTES
Subjective:   Lucas Simmons is a 43 y.o. male who presents for Influenza (started saturday, fever, coughing that hurts throat, body ache)    This is a new problem.  Patient presents to urgent care with 4-day history of generalized body aches, headache, sore throat, nasal congestion, cough and fever.  Cough is nonproductive.  Patient does admit to feeling somewhat short of breath at times.  Patient did receive influenza vaccine this season.  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  He has been taking TheraFlu over-the-counter with minimal improvement in symptoms.  He is inquiring if he needs an antibiotic.      Influenza   Associated symptoms include chills, congestion, coughing, a fever, headaches, myalgias and a sore throat. Pertinent negatives include no nausea or vomiting.     Review of Systems   Constitutional: Positive for chills, fever and malaise/fatigue.   HENT: Positive for congestion and sore throat. Negative for ear pain.    Respiratory: Positive for cough. Negative for sputum production, shortness of breath and wheezing.    Gastrointestinal: Negative for diarrhea, nausea and vomiting.   Musculoskeletal: Positive for myalgias.   Neurological: Positive for headaches.   All other systems reviewed and are negative.    No Known Allergies     Objective:   /62   Pulse (!) 105   Temp 37.3 °C (99.2 °F) (Temporal)   SpO2 96%   Physical Exam   Constitutional: He is oriented to person, place, and time. He appears well-developed and well-nourished. He does not appear ill. No distress.   HENT:   Head: Normocephalic and atraumatic.   Right Ear: Tympanic membrane, external ear and ear canal normal.   Left Ear: Tympanic membrane, external ear and ear canal normal.   Nose: Mucosal edema present. No rhinorrhea. Right sinus exhibits no maxillary sinus tenderness and no frontal sinus tenderness. Left sinus exhibits no maxillary sinus tenderness and no frontal sinus tenderness.    Mouth/Throat: Uvula is midline and mucous membranes are normal. Posterior oropharyngeal erythema present. No oropharyngeal exudate or posterior oropharyngeal edema. Tonsils are 1+ on the right. Tonsils are 1+ on the left. No tonsillar exudate.   Eyes: Pupils are equal, round, and reactive to light. Conjunctivae and EOM are normal.   Neck: Normal range of motion. Neck supple.   Cardiovascular: Normal rate, regular rhythm and normal heart sounds.  Exam reveals no friction rub.    No murmur heard.  Pulmonary/Chest: Effort normal and breath sounds normal. No respiratory distress.   Abdominal: Soft. Bowel sounds are normal. There is no hepatosplenomegaly. There is no tenderness.   Musculoskeletal: Normal range of motion.   Lymphadenopathy:        Head (right side): No submental, no submandibular and no tonsillar adenopathy present.        Head (left side): No submental, no submandibular and no tonsillar adenopathy present.     He has no cervical adenopathy.        Right: No supraclavicular adenopathy present.        Left: No supraclavicular adenopathy present.   Neurological: He is alert and oriented to person, place, and time. He has normal strength. No cranial nerve deficit or sensory deficit. Coordination normal.   Skin: Skin is warm and dry. No rash noted.   Psychiatric: He has a normal mood and affect. Judgment normal.   Vitals reviewed.          Assessment/Plan:   Assessment    1. Acute viral syndrome  - POCT Influenza A/B    2. Cough  - benzonatate (TESSALON) 100 MG Cap; Take 2 Caps by mouth 3 times a day as needed.  Dispense: 60 Cap; Refill: 0    Testing for influenza was performed however, as the test was running we experienced a power outage for several hours and therefore the test was not able to be completed.  I did review with the patient the fact that he is 4 days into his illness having confirmation of influenza would not change our plan.  Unfortunately, he is too far into his illness for consideration  for Tamiflu.  Symptomatic, supportive care.  Reassurance provided.  The patient is very concerned about the possibility of pneumonia and is requesting a chest x-ray.  I have reviewed with the patient that his lung exam is entirely normal, vital signs are stable, he is nontoxic-appearing and his oxygen level is within normal limits.  I do not feel that a chest x-ray is warranted at this time.  Patient will be treated with Tessalon Perles as needed for cough.  Red flag warning symptoms and strict ER precautions given.    Differential diagnosis, natural history, supportive care, and indications for immediate follow-up discussed.    If not improving in 3-5 days, F/U with PCP or return to  or sooner if worsens  Strict ER precautions given.    Please note that this note was created using voice recognition speech to text software. Every effort has been made to correct obvious errors.  However, I expect there are errors of grammar and possibly context that were not discovered prior to finalizing the note

## 2019-03-01 ENCOUNTER — OFFICE VISIT (OUTPATIENT)
Dept: CARDIOLOGY | Facility: MEDICAL CENTER | Age: 44
End: 2019-03-01
Payer: COMMERCIAL

## 2019-03-01 VITALS
SYSTOLIC BLOOD PRESSURE: 100 MMHG | HEART RATE: 82 BPM | WEIGHT: 178 LBS | DIASTOLIC BLOOD PRESSURE: 70 MMHG | BODY MASS INDEX: 30.39 KG/M2 | HEIGHT: 64 IN | OXYGEN SATURATION: 95 %

## 2019-03-01 DIAGNOSIS — R00.2 PALPITATIONS: ICD-10-CM

## 2019-03-01 DIAGNOSIS — I42.9 CARDIOMYOPATHY, UNSPECIFIED TYPE (HCC): ICD-10-CM

## 2019-03-01 DIAGNOSIS — R07.89 OTHER CHEST PAIN: ICD-10-CM

## 2019-03-01 DIAGNOSIS — E66.9 OBESITY (BMI 30-39.9): ICD-10-CM

## 2019-03-01 PROCEDURE — 99214 OFFICE O/P EST MOD 30 MIN: CPT | Performed by: INTERNAL MEDICINE

## 2019-03-01 RX ORDER — PROPRANOLOL HYDROCHLORIDE 10 MG/1
5 TABLET ORAL 2 TIMES DAILY
Qty: 90 TAB | Refills: 3 | Status: SHIPPED | OUTPATIENT
Start: 2019-03-01 | End: 2020-03-24

## 2019-03-02 NOTE — PROGRESS NOTES
Cardiology Follow-up Consultation Note    Date of note:    3/1/2019    Primary Care Provider: Jenny Frye M.D.  Referring Provider: Self    Patient Name: Lucas Simmons     YOB: 1975  MRN:              [id number]    Chief Complaint: palpitations    History of Present Illness: Lucas Simmons is a 43 y.o. male whose current medical problems include symptomatic PACs/PVCs who is here for follow-up.    Interim Events:  Occasional he wakes up with mild severity substernal palpitations. These worsen with holding his propranolol. They are never severe, long lasting, or associated with lightheadedness, dizziness or other symptoms.     + left sided chest discomfort radiating to his left arm. Mild and constant since he thinks he tore a muscle two years ago.       Patient denies  dyspnea on exertion, pre-syncope, syncope, lower extremity swelling, orthopnea, PND or recent weight gain.         ROS  Constitution: Negative for current chills, fever and night sweats.   HENT: Negative for nosebleeds.    Eyes: Negative for vision loss in left eye and vision loss in right eye.   Respiratory: Negative for hemoptysis.    Gastrointestinal: Negative for hematemesis, hematochezia and melena.   Genitourinary: Negative for hematuria.   Neurological: Negative for focal weakness, numbness and paresthesias.         Past Medical History:   Diagnosis Date   • Arrhythmia          No past surgical history on file.      Current Outpatient Prescriptions   Medication Sig Dispense Refill   • propranolol (INDERAL) 10 MG Tab Take 0.5 Tabs by mouth 2 times a day. 60 Tab 3   • benzonatate (TESSALON) 100 MG Cap Take 2 Caps by mouth 3 times a day as needed. (Patient not taking: Reported on 3/1/2019) 60 Cap 0   • Multiple Vitamin (MULTIVITAMINS PO) Take  by mouth.     • omeprazole (PRILOSEC) 20 MG delayed-release capsule Take 1 Cap by mouth every day. (Patient not taking: Reported on 11/29/2018) 30 Cap 2   • Probiotic Product (PROBIOTIC  "PO) Take 1 Each by mouth every day.       No current facility-administered medications for this visit.          No Known Allergies      Family History   Problem Relation Age of Onset   • Hypertension Mother    • Cancer Father         liver cancer    • Alcohol/Drug Father    • Arthritis Sister    • Arthritis Brother    • Arthritis Brother    • Arthritis Brother          Social History     Social History   • Marital status:      Spouse name: N/A   • Number of children: 2   • Years of education: N/A     Occupational History   • [employer]      Social History Main Topics   • Smoking status: Never Smoker   • Smokeless tobacco: Never Used   • Alcohol use 0.0 oz/week      Comment: occasional   • Drug use: No   • Sexual activity: Yes     Partners: Male     Other Topics Concern   • Not on file     Social History Narrative   • No narrative on file         Physical Exam:  Ambulatory Vitals  Blood pressure 100/70, pulse 82, height 1.626 m (5' 4\"), weight 80.7 kg (178 lb), SpO2 95 %.   Oxygen Therapy:  Pulse Oximetry: 95 %  BP Readings from Last 4 Encounters:   03/01/19 100/70   02/26/19 102/62   11/29/18 100/70   07/23/18 (!) 90/70       Weight/BMI: Body mass index is 30.55 kg/m².  Wt Readings from Last 4 Encounters:   03/01/19 80.7 kg (178 lb)   11/29/18 81.8 kg (180 lb 5.4 oz)   07/23/18 82.1 kg (181 lb)   06/26/18 81.6 kg (180 lb)       General: Well appearing and in no apparent distress  Eyes: nl conjunctiva  ENT: OP clear, normal external appearance of nose and ears  Neck: JVP 4 cm H2O, no carotid bruits  Lungs: normal respiratory effort, CTAB  Heart: RRR, no murmurs, no rubs or gallops, no edema bilateral lower extremities. No LV/RV heave on cardiac palpatation. 2+ bilateral radial pulses.  2+ bilateral dp pulses.   Abdomen: soft, non tender, non distended, no masses, normal bowel sounds.  No HSM.  Extremities/MSK: no clubbing, no cyanosis  Neurological: No focal sensory deficits  Psychiatric: Appropriate " affect, A/O x 3, intact judgement and insight  Skin: Warm extremities    Lab Data Review:  Lab Results   Component Value Date/Time    CHOLSTRLTOT 166 06/26/2018 08:10 AM    LDL 85 06/26/2018 08:10 AM    HDL 39 (A) 06/26/2018 08:10 AM    TRIGLYCERIDE 209 (H) 06/26/2018 08:10 AM       Lab Results   Component Value Date/Time    SODIUM 138 05/18/2018 05:17 AM    POTASSIUM 4.1 05/18/2018 05:17 AM    CHLORIDE 108 05/18/2018 05:17 AM    CO2 26 05/18/2018 05:17 AM    GLUCOSE 84 06/26/2018 08:10 AM    BUN 9 05/18/2018 05:17 AM    CREATININE 0.98 05/18/2018 05:17 AM     Lab Results   Component Value Date/Time    ALKPHOSPHAT 68 05/18/2018 05:17 AM    ASTSGOT 34 05/18/2018 05:17 AM    ALTSGPT 68 (H) 05/18/2018 05:17 AM    TBILIRUBIN 0.9 05/18/2018 05:17 AM      Lab Results   Component Value Date/Time    WBC 6.5 05/18/2018 05:17 AM     No components found for: HBGA1C  No components found for: TROPONIN  No components found for: BNP      Cardiac Imaging and Procedures Review:    EKG dated 5/17/2018 : My personal interpretation is NSR    Echo dated 1/2017:   CONCLUSIONS  No prior study is available for comparison.   Normal left ventricular systolic function.   Normal right ventricular systolic function.   No evidence of valvular abnormality based on Doppler evaluation.     holter monitor 12/2016    Interpretive Statements   *  Monitoring started at 3:05 PM and continued for 48 hours. Cardiac rhythm   is Sinus. Heart rate in the range of   47 to 128 BPM. Average heart rate was 78 BPM. The longest R-R interval   was 1.5 seconds.   *  Ventricular ectopic activity consisted of six multifocal single PVCs.   *  Supraventricular ectopic activity consisted of 10 late beats, three single   PACs.   *  Diary entries - no symptoms reported in diary.     NO SIGNIFICANT AV BLOCK   NO SINUS PAUSE   OCCASIONAL PREMATURE VENTRICULAR COMPLEX   OCCASIONAL PREMATURE ATRIAL COMPLEX       Nuclear Perfusion Imaging (5/18/2018):    Myocardial  Perfusion   Report   NUCLEAR IMAGING INTERPRETATION   * No evidence of significant jeopardized viable myocardium or prior    myocardial infarction.   * Equivocal wall motion abnormality in the basal, mid, and apical    inferoseptal and anteroseptal walls. Consider echocardiogram for    confirmation. There is no perfusion defect in this area   * LV ejection fraction = 69%.    Radiology test Review:  CXR:   Negative single view of the chest.      Medical Decision Makin. Other chest pain  Non-cardiac, non-exertional. Negative previous stress test  -CTM    2. Palpitations  Continue propranolol. If worsens, increase dose, otherwise CTM.    3. Obesity (BMI 30-39.9)  Continue exercise    4. Cardiomyopathy, unspecified type (HCC)  Focal WMA seen on stress testing not in the setting of arrhythmia, will ensure no development of cardiomyopathy  -check echo.       Return in about 1 year (around 3/1/2020).      Maxime Mendoza MD, [facility]  [facility], [address]  [address] [address]  Phone: [phone]  Fax: [phone]

## 2019-03-21 ENCOUNTER — HOSPITAL ENCOUNTER (OUTPATIENT)
Dept: CARDIOLOGY | Facility: MEDICAL CENTER | Age: 44
End: 2019-03-21
Attending: INTERNAL MEDICINE
Payer: COMMERCIAL

## 2019-03-21 DIAGNOSIS — I42.9 CARDIOMYOPATHY, UNSPECIFIED TYPE (HCC): ICD-10-CM

## 2019-03-21 PROCEDURE — 93306 TTE W/DOPPLER COMPLETE: CPT | Mod: 26 | Performed by: INTERNAL MEDICINE

## 2019-03-21 PROCEDURE — 93306 TTE W/DOPPLER COMPLETE: CPT

## 2019-03-22 LAB
LV EJECT FRACT  99904: 65
LV EJECT FRACT MOD 2C 99903: 67.68
LV EJECT FRACT MOD 4C 99902: 71.08
LV EJECT FRACT MOD BP 99901: 70.34

## 2019-05-30 ENCOUNTER — APPOINTMENT (OUTPATIENT)
Dept: MEDICAL GROUP | Facility: PHYSICIAN GROUP | Age: 44
End: 2019-05-30
Payer: COMMERCIAL

## 2019-08-15 ENCOUNTER — HOSPITAL ENCOUNTER (OUTPATIENT)
Dept: LAB | Facility: MEDICAL CENTER | Age: 44
End: 2019-08-15
Payer: COMMERCIAL

## 2019-08-15 LAB
BDY FAT % MEASURED: 26.5 %
BP DIAS: 64 MMHG
BP SYS: 106 MMHG
CHOLEST SERPL-MCNC: 170 MG/DL (ref 100–199)
DIABETES HTDIA: NO
EVENT NAME HTEVT: NORMAL
FASTING HTFAS: YES
GLUCOSE SERPL-MCNC: 94 MG/DL (ref 65–99)
HDLC SERPL-MCNC: 41 MG/DL
HYPERTENSION HTHYP: NO
LDLC SERPL CALC-MCNC: 86 MG/DL
SCREENING LOC CITY HTCIT: NORMAL
SCREENING LOC STATE HTSTA: NORMAL
SCREENING LOCATION HTLOC: NORMAL
SMOKING HTSMO: NO
SUBSCRIBER ID HTSID: NORMAL
TRIGL SERPL-MCNC: 215 MG/DL (ref 0–149)

## 2019-08-15 PROCEDURE — 80061 LIPID PANEL: CPT

## 2019-08-15 PROCEDURE — 36415 COLL VENOUS BLD VENIPUNCTURE: CPT

## 2019-08-15 PROCEDURE — S5190 WELLNESS ASSESSMENT BY NONPH: HCPCS

## 2019-08-15 PROCEDURE — 82947 ASSAY GLUCOSE BLOOD QUANT: CPT

## 2019-09-27 ENCOUNTER — IMMUNIZATION (OUTPATIENT)
Dept: OCCUPATIONAL MEDICINE | Facility: CLINIC | Age: 44
End: 2019-09-27

## 2019-09-27 DIAGNOSIS — Z23 NEED FOR VACCINATION: ICD-10-CM

## 2019-09-27 PROCEDURE — 90686 IIV4 VACC NO PRSV 0.5 ML IM: CPT | Performed by: PREVENTIVE MEDICINE

## 2019-10-16 ENCOUNTER — DOCUMENTATION (OUTPATIENT)
Dept: OCCUPATIONAL MEDICINE | Facility: CLINIC | Age: 44
End: 2019-10-16

## 2019-10-18 ENCOUNTER — EH NON-PROVIDER (OUTPATIENT)
Dept: OCCUPATIONAL MEDICINE | Facility: CLINIC | Age: 44
End: 2019-10-18

## 2019-10-18 DIAGNOSIS — Z02.89 ENCOUNTER FOR OCCUPATIONAL HEALTH EXAMINATION INVOLVING RESPIRATOR: Primary | ICD-10-CM

## 2019-10-18 PROCEDURE — 94375 RESPIRATORY FLOW VOLUME LOOP: CPT | Performed by: NURSE PRACTITIONER

## 2020-01-25 ENCOUNTER — HOSPITAL ENCOUNTER (EMERGENCY)
Facility: MEDICAL CENTER | Age: 45
End: 2020-01-26
Attending: EMERGENCY MEDICINE
Payer: COMMERCIAL

## 2020-01-25 DIAGNOSIS — J02.9 PHARYNGITIS, UNSPECIFIED ETIOLOGY: ICD-10-CM

## 2020-01-25 DIAGNOSIS — J00 ACUTE RHINITIS: ICD-10-CM

## 2020-01-25 PROCEDURE — 99283 EMERGENCY DEPT VISIT LOW MDM: CPT

## 2020-01-26 ENCOUNTER — APPOINTMENT (OUTPATIENT)
Dept: RADIOLOGY | Facility: MEDICAL CENTER | Age: 45
End: 2020-01-26
Attending: EMERGENCY MEDICINE
Payer: COMMERCIAL

## 2020-01-26 VITALS
SYSTOLIC BLOOD PRESSURE: 98 MMHG | TEMPERATURE: 97.6 F | WEIGHT: 183.86 LBS | BODY MASS INDEX: 31.39 KG/M2 | HEART RATE: 90 BPM | OXYGEN SATURATION: 98 % | HEIGHT: 64 IN | RESPIRATION RATE: 16 BRPM | DIASTOLIC BLOOD PRESSURE: 67 MMHG

## 2020-01-26 LAB
BASOPHILS # BLD AUTO: 0.5 % (ref 0–1.8)
BASOPHILS # BLD: 0.04 K/UL (ref 0–0.12)
EOSINOPHIL # BLD AUTO: 0.15 K/UL (ref 0–0.51)
EOSINOPHIL NFR BLD: 1.8 % (ref 0–6.9)
ERYTHROCYTE [DISTWIDTH] IN BLOOD BY AUTOMATED COUNT: 38.6 FL (ref 35.9–50)
HCT VFR BLD AUTO: 43.2 % (ref 42–52)
HGB BLD-MCNC: 14.9 G/DL (ref 14–18)
IMM GRANULOCYTES # BLD AUTO: 0.1 K/UL (ref 0–0.11)
IMM GRANULOCYTES NFR BLD AUTO: 1.2 % (ref 0–0.9)
LYMPHOCYTES # BLD AUTO: 1.11 K/UL (ref 1–4.8)
LYMPHOCYTES NFR BLD: 13.2 % (ref 22–41)
MCH RBC QN AUTO: 31.4 PG (ref 27–33)
MCHC RBC AUTO-ENTMCNC: 34.5 G/DL (ref 33.7–35.3)
MCV RBC AUTO: 91.1 FL (ref 81.4–97.8)
MONOCYTES # BLD AUTO: 0.7 K/UL (ref 0–0.85)
MONOCYTES NFR BLD AUTO: 8.3 % (ref 0–13.4)
NEUTROPHILS # BLD AUTO: 6.3 K/UL (ref 1.82–7.42)
NEUTROPHILS NFR BLD: 75 % (ref 44–72)
NRBC # BLD AUTO: 0 K/UL
NRBC BLD-RTO: 0 /100 WBC
PLATELET # BLD AUTO: 228 K/UL (ref 164–446)
PMV BLD AUTO: 9.2 FL (ref 9–12.9)
RBC # BLD AUTO: 4.74 M/UL (ref 4.7–6.1)
S PYO AG THROAT QL: NORMAL
SIGNIFICANT IND 70042: NORMAL
SITE SITE: NORMAL
SOURCE SOURCE: NORMAL
WBC # BLD AUTO: 8.4 K/UL (ref 4.8–10.8)

## 2020-01-26 PROCEDURE — 85025 COMPLETE CBC W/AUTO DIFF WBC: CPT

## 2020-01-26 PROCEDURE — 87081 CULTURE SCREEN ONLY: CPT

## 2020-01-26 PROCEDURE — 36415 COLL VENOUS BLD VENIPUNCTURE: CPT

## 2020-01-26 PROCEDURE — 71046 X-RAY EXAM CHEST 2 VIEWS: CPT

## 2020-01-26 PROCEDURE — 87880 STREP A ASSAY W/OPTIC: CPT

## 2020-01-26 NOTE — ED PROVIDER NOTES
ED Provider Note    ED Provider Note    Scribed for Angelia Espitia MD by Angelia Espitia M.D.. 1/26/2020, 12:16 AM.    Primary care provider: Jenny Frye M.D.  Means of arrival: Private  History obtained from: Patient  History limited by: None    CHIEF COMPLAINT  Chief Complaint   Patient presents with   • Cough       HPI  Lucas Simmons Jr. is a 44 y.o. male who presents to the Emergency Department for evaluation of cough and subjective fever.  Patient relates he has been ill since yesterday.  He does note recent travel, noting his return from the [facility] and did have a brief layover in [place].  Thankfully neither these countries per our updated from the CDC are affected by the novel coronavirus, though the patient is concerned for this possibility and thus he came to be evaluated.  Patient notes no body aches, no nausea, no vomiting, no fatigue.  He has no dyspnea, no chest pain.  Mild discomfort in his throat similar to when he has had allergic reactions in the past but no difficulty breathing or difficulty swallowing.  No ear pain, though he does nor some nasal congestion.  No particular ill contacts that he is aware of.  He is a non-smoker has no established respiratory history.    REVIEW OF SYSTEMS  Pertinent positives include nonproductive cough, throat discomfort, nasal congestion. Pertinent negatives include no precordial chest pain, no dyspnea.      PAST MEDICAL HISTORY   has a past medical history of Symptomatic PVCs.    SURGICAL HISTORY  patient denies any surgical history    SOCIAL HISTORY  Social History     Tobacco Use   • Smoking status: Never Smoker   • Smokeless tobacco: Never Used   Substance Use Topics   • Alcohol use: Yes     Alcohol/week: 0.0 oz     Comment: occasional   • Drug use: No      Social History     Substance and Sexual Activity   Drug Use No       FAMILY HISTORY  Family History   Problem Relation Age of Onset   • Hypertension Mother    • Cancer  "Father         liver cancer    • Alcohol/Drug Father    • Arthritis Sister    • Arthritis Brother    • Arthritis Brother    • Arthritis Brother        CURRENT MEDICATIONS  Home Medications    **Home medications have not yet been reviewed for this encounter**         ALLERGIES  No Known Allergies    PHYSICAL EXAM  VITAL SIGNS: BP (!) 98/67   Pulse 90   Temp 36.4 °C (97.6 °F) (Temporal)   Resp 16   Ht 1.626 m (5' 4\")   Wt 83.4 kg (183 lb 13.8 oz)   SpO2 98%   BMI 31.56 kg/m²     General: Alert, no acute distress  Skin: Warm, dry, normal for ethnicity  Head: Normocephalic, atraumatic  Neck: Trachea midline, no tenderness  Eye: PERRL, normal conjunctiva  ENMT: Oral mucosa moist, mild pharyngeal erythema or exudate.  Mildly boggy nasal turbinates with scant rhinorrhea.  Cardiovascular: Regular rate and rhythm, No murmur, Normal peripheral perfusion  Respiratory: Lungs CTA, respirations are non-labored, breath sounds are equal, no rales, no rhonchi, no stridor.  Musculoskeletal: No swelling, no deformity  Neurological: Alert and oriented to person, place, time, and situation  Lymphatics: No lymphadenopathy  Psychiatric: Cooperative, appropriate mood & affect      DIAGNOSTIC STUDIES/PROCEDURES    LABS  Results for orders placed or performed during the hospital encounter of 01/25/20   CBC WITH DIFFERENTIAL   Result Value Ref Range    WBC 8.4 4.8 - 10.8 K/uL    RBC 4.74 4.70 - 6.10 M/uL    Hemoglobin 14.9 14.0 - 18.0 g/dL    Hematocrit 43.2 42.0 - 52.0 %    MCV 91.1 81.4 - 97.8 fL    MCH 31.4 27.0 - 33.0 pg    MCHC 34.5 33.7 - 35.3 g/dL    RDW 38.6 35.9 - 50.0 fL    Platelet Count 228 164 - 446 K/uL    MPV 9.2 9.0 - 12.9 fL    Neutrophils-Polys 75.00 (H) 44.00 - 72.00 %    Lymphocytes 13.20 (L) 22.00 - 41.00 %    Monocytes 8.30 0.00 - 13.40 %    Eosinophils 1.80 0.00 - 6.90 %    Basophils 0.50 0.00 - 1.80 %    Immature Granulocytes 1.20 (H) 0.00 - 0.90 %    Nucleated RBC 0.00 /100 WBC    Neutrophils (Absolute) 6.30 " 1.82 - 7.42 K/uL    Lymphs (Absolute) 1.11 1.00 - 4.80 K/uL    Monos (Absolute) 0.70 0.00 - 0.85 K/uL    Eos (Absolute) 0.15 0.00 - 0.51 K/uL    Baso (Absolute) 0.04 0.00 - 0.12 K/uL    Immature Granulocytes (abs) 0.10 0.00 - 0.11 K/uL    NRBC (Absolute) 0.00 K/uL   RAPID STREP,CULT IF INDICATED   Result Value Ref Range    Significant Indicator NEG     Source THRT     Site THROAT     Rapid Strep Screen       Negative for Group A streptococcus.  A negative result may be obtained if the specimen is  inadequate or antigen concentration is below the  sensitivity of the test. This negative test will be followed  up with a culture as requested.       All labs reviewed by me, mild neutrophil predominance, otherwise unremarkable.      RADIOLOGY  DX-CHEST-2 VIEWS   Final Result         1. No active cardiopulmonary abnormalities are identified.        The radiologist's interpretation of all radiological studies have been reviewed by me.    COURSE & MEDICAL DECISION MAKING  Pertinent Labs & Imaging studies reviewed. (See chart for details)    12:16 AM - Patient seen and examined at bedside. Ordered x-ray of chest, CBC, and rapid strep to evaluate his symptoms. The differential diagnoses include but are not limited to: Viral pharyngitis, strep pharyngitis, rhinitis, bronchitis, pneumonia, pneumonitis    0055: Patient reassessed, relieved here of unremarkable studies, remains in no acute distress    Decision Making:  This is a 44 y.o. year old male who presents with nonproductive cough with nasal congestion mild throat discomfort and subjective fever with recent travel to [place].  Patient is concerned for the novel corona virus that he is heard about the news.  Patient has not traveled to any countries where this viral infection is extensive, certainly low risk for such exposure.  He is well-appearing, he has no infiltrates on the chest x-ray, no hypoxia, he has no lymphocytosis, no leukocytosis, presentation is not consistent  with sepsis nor severe viremia.  Suspect likely viral upper respiratory infection versus pharyngitis, recommend symptomatic management.    The patient will return for new or worsening symptoms and is stable at the time of discharge.      DISPOSITION:  Patient will be discharged home in stable condition.    FOLLOW UP:  Jenny Frye M.D.  [address]  [place] [address]  [phone]    Schedule an appointment as soon as possible for a visit in 3 days        OUTPATIENT MEDICATIONS:  Discharge Medication List as of 1/26/2020  1:01 AM            FINAL IMPRESSION  1. Pharyngitis, unspecified etiology    2. Acute rhinitis          Angelia PINEDA M.D. (Scribe), am scribing for, and in the presence of, Angelia Espitia MD.    Electronically signed by: Angelia Espitia M.D. ([place]), 1/26/2020    Angelia PINEDA MD personally performed the services described in this documentation, as scribed by Angelia Espitia M.D. in my presence, and it is both accurate and complete    The note accurately reflects work and decisions made by me.  Angelia Espitia M.D.  1/26/2020  1:30 AM

## 2020-01-26 NOTE — ED TRIAGE NOTES
Patient c/o mild cough since 9pm. He flew from [facility] to [place] for a layover, to [place] for a layover, to [place].

## 2020-01-27 ENCOUNTER — OFFICE VISIT (OUTPATIENT)
Dept: URGENT CARE | Facility: MEDICAL CENTER | Age: 45
End: 2020-01-27
Payer: COMMERCIAL

## 2020-01-27 VITALS
BODY MASS INDEX: 32.32 KG/M2 | HEIGHT: 63 IN | WEIGHT: 182.4 LBS | DIASTOLIC BLOOD PRESSURE: 58 MMHG | HEART RATE: 111 BPM | TEMPERATURE: 100.7 F | OXYGEN SATURATION: 96 % | SYSTOLIC BLOOD PRESSURE: 94 MMHG

## 2020-01-27 DIAGNOSIS — R05.9 COUGH: ICD-10-CM

## 2020-01-27 DIAGNOSIS — R50.9 FEVER, UNSPECIFIED FEVER CAUSE: ICD-10-CM

## 2020-01-27 DIAGNOSIS — J10.1 INFLUENZA A: Primary | ICD-10-CM

## 2020-01-27 LAB
FLUAV+FLUBV AG SPEC QL IA: POSITIVE
INT CON NEG: NORMAL
INT CON POS: NORMAL

## 2020-01-27 PROCEDURE — 87804 INFLUENZA ASSAY W/OPTIC: CPT | Performed by: NURSE PRACTITIONER

## 2020-01-27 PROCEDURE — 99214 OFFICE O/P EST MOD 30 MIN: CPT | Performed by: NURSE PRACTITIONER

## 2020-01-27 RX ORDER — OSELTAMIVIR PHOSPHATE 75 MG/1
75 CAPSULE ORAL 2 TIMES DAILY
Qty: 10 CAP | Refills: 0 | Status: SHIPPED | OUTPATIENT
Start: 2020-01-27 | End: 2021-01-05

## 2020-01-27 ASSESSMENT — ENCOUNTER SYMPTOMS
HEADACHES: 0
DIZZINESS: 0
COUGH: 1
NAUSEA: 0
CHILLS: 0
VOMITING: 0
SORE THROAT: 1
FEVER: 1

## 2020-01-27 ASSESSMENT — LIFESTYLE VARIABLES: SUBSTANCE_ABUSE: 0

## 2020-01-27 NOTE — PATIENT INSTRUCTIONS
"Influenza A (H1N1)  H1N1 formerly called \"swine flu\" is a new influenza virus causing sickness in people. The H1N1 virus is different from seasonal influenza viruses. However, the H1N1 symptoms are similar to seasonal influenza and it is spread from person to person. You may be at higher risk for serious problems if you have underlying serious medical conditions. The CDC and the World Health Organization are following reported cases around the world.  CAUSES   · The flu is thought to spread mainly person-to-person through coughing or sneezing of infected people.  · A person may become infected by touching something with the virus on it and then touching their mouth or nose.  SYMPTOMS   · Fever.  · Headache.  · Tiredness.  · Cough.  · Sore throat.  · Runny or stuffy nose.  · Body aches.  · Diarrhea and vomiting  These symptoms are referred to as \"flu-like symptoms.\" A lot of different illnesses, including the common cold, may have similar symptoms.  DIAGNOSIS   · There are tests that can tell if you have the H1N1 virus.  · Confirmed cases of H1N1 will be reported to the state or local health department.  · A doctor's exam may be needed to tell whether you have an infection that is a complication of the flu.  HOME CARE INSTRUCTIONS   · Stay informed. Visit the CDC website for current recommendations. Visit www.cdc.gov/B6L6ykr/. You may also call 6-048-BAV-INFO (1-909.675.4646).  · Get help early if you develop any of the above symptoms.  · If you are at high risk from complications of the flu, talk to your caregiver as soon as you develop flu-like symptoms. Those at higher risk for complications include:  · People 65 years or older.  · People with chronic medical conditions.  · Pregnant women.  · Young children.  · Your caregiver may recommend antiviral medicine to help treat the flu.  · If you get the flu, get plenty of rest, drink enough water and fluids to keep your urine clear or pale yellow, and avoid using " alcohol or tobacco.  · You may take over-the-counter medicine to relieve the symptoms of the flu if your caregiver approves. (Never give aspirin to children or teenagers who have flu-like symptoms, particularly fever).  TREATMENT   If you do get sick, antiviral drugs are available. These drugs can make your illness milder and make you feel better faster. Treatment should start soon after illness starts. It is only effective if taken within the first day of becoming ill. Only your caregiver can prescribe antiviral medication.   PREVENTION   · Cover your nose and mouth with a tissue or your arm when you cough or sneeze. Throw the tissue away.  · Wash your hands often with soap and warm water, especially after you cough or sneeze. Alcohol-based  are also effective against germs.  · Avoid touching your eyes, nose or mouth. This is one way germs spread.  · Try to avoid contact with sick people. Follow public health advice regarding school closures. Avoid crowds.  · Stay home if you get sick. Limit contact with others to keep from infecting them. People infected with the H1N1 virus may be able to infect others anywhere from 1 day before feeling sick to 5-7 days after getting flu symptoms.  · An H1N1 vaccine is available to help protect against the virus. In addition to the H1N1 vaccine, you will need to be vaccinated for seasonal influenza. The H1N1 and seasonal vaccines may be given on the same day. The CDC especially recommends the H1N1 vaccine for:  · Pregnant women.  · People who live with or care for children younger than 6 months of age.  · Health care and emergency services personnel.  · Persons between the ages of 6 months through 24 years of age.  · People from ages 25 through 64 years who are at higher risk for H1N1 because of chronic health disorders or immune system problems.  FACEMASKS  In community and home settings, the use of facemasks and N95 respirators are not normally recommended. In certain  circumstances, a facemask or N95 respirator may be used for persons at increased risk of severe illness from influenza. Your caregiver can give additional recommendations for facemask use.  IN CHILDREN, EMERGENCY WARNING SIGNS THAT NEED URGENT MEDICAL CARE:  · Fast breathing or trouble breathing.  · Bluish skin color.  · Not drinking enough fluids.  · Not waking up or not interacting normally.  · Being so fussy that the child does not want to be held.  · Your child has an oral temperature above 102° F (38.9° C), not controlled by medicine.  · Your baby is older than 3 months with a rectal temperature of 102° F (38.9° C) or higher.  · Your baby is 3 months old or younger with a rectal temperature of 100.4° F (38° C) or higher.  · Flu-like symptoms improve but then return with fever and worse cough.  IN ADULTS, EMERGENCY WARNING SIGNS THAT NEED URGENT MEDICAL CARE:  · Difficulty breathing or shortness of breath.  · Pain or pressure in the chest or abdomen.  · Sudden dizziness.  · Confusion.  · Severe or persistent vomiting.  · Bluish color.  · You have a oral temperature above 102° F (38.9° C), not controlled by medicine.  · Flu-like symptoms improve but return with fever and worse cough.  SEEK IMMEDIATE MEDICAL CARE IF:   You or someone you know is experiencing any of the above symptoms. When you arrive at the emergency center, report that you think you have the flu. You may be asked to wear a mask and/or sit in a secluded area to protect others from getting sick.  MAKE SURE YOU:   · Understand these instructions.  · Will watch your condition.  · Will get help right away if you are not doing well or get worse.  Some of this information courtesy of the CDC.   Document Released: 06/05/2009 Document Revised: 03/11/2013 Document Reviewed: 06/05/2009  ExitCare® Patient Information ©2014 [employer].      Fever, Adult  A fever is an increase in the body’s temperature. It is usually defined as a temperature of 100°F  (38°C) or higher. Brief mild or moderate fevers generally have no long-term effects, and they often do not require treatment. Moderate or high fevers may make you feel uncomfortable and can sometimes be a sign of a serious illness or disease. The sweating that may occur with repeated or prolonged fever may also cause dehydration.  Fever is confirmed by taking a temperature with a thermometer. A measured temperature can vary with:  · Age.  · Time of day.  · Location of the thermometer:  ¨ Mouth (oral).  ¨ Rectum (rectal).  ¨ Ear (tympanic).  ¨ Underarm (axillary).  ¨ Forehead (temporal).  Follow these instructions at home:  Pay attention to any changes in your symptoms. Take these actions to help with your condition:  · Take over-the counter and prescription medicines only as told by your health care provider. Follow the dosing instructions carefully.  · If you were prescribed an antibiotic medicine, take it as told by your health care provider. Do not stop taking the antibiotic even if you start to feel better.  · Rest as needed.  · Drink enough fluid to keep your urine clear or pale yellow. This helps to prevent dehydration.  · Sponge yourself or bathe with room-temperature water to help reduce your body temperature as needed. Do not use ice water.  · Do not overbundle yourself in blankets or heavy clothes.  Contact a health care provider if:  · You vomit.  · You cannot eat or drink without vomiting.  · You have diarrhea.  · You have pain when you urinate.  · Your symptoms do not improve with treatment.  · You develop new symptoms.  · You develop excessive weakness.  Get help right away if:  · You have shortness of breath or have trouble breathing.  · You are dizzy or you faint.  · You are disoriented or confused.  · You develop signs of dehydration, such as a dry mouth, decreased urination, or paleness.  · You develop severe pain in your abdomen.  · You have persistent vomiting or diarrhea.  · You develop a skin  rash.  · Your symptoms suddenly get worse.  This information is not intended to replace advice given to you by your health care provider. Make sure you discuss any questions you have with your health care provider.  Document Released: 06/13/2002 Document Revised: 05/25/2017 Document Reviewed: 02/11/2016  Elsevier Interactive Patient Education © 2017 [employer]

## 2020-01-27 NOTE — LETTER
January 27, 2020       Patient: Lucas Simmons .   YOB: 1975   Date of Visit: 1/27/2020         To Whom It May Concern:    It is my medical opinion that Lucas Simmons return to full duty, no restrictions.01/30/2020.              Sincerely,          DAV Wong  Electronically Signed

## 2020-01-27 NOTE — PROGRESS NOTES
"Subjective:      Lucas Simmons Jr. is a 44 y.o. male who presents with Fever (x3 days ago, fever and body aches )    Reviewed past medical, surgical and family history. Reviewed prescription and OTC medications with patient in electronic health record today      No Known Allergies          HPI This is a new problem. Seen in ER 2 days ago.  Traveled to [place] last Saturday ( 3 days ago) . He is concerned about coronavirus. He was not in an effected area but was in international airport where other travelers were from that area.  He continued to have high fever w. Body aches. He has been taking ibuprofen and tylenol for fever. Fever maximum 100.1* F, back ache, headache.  He is coughing - non-productive. Occ hears wheezing in his chest.   Last time he took anything for fever was 0900 today ( Ibuprofen 200 mg and 500 mg tylenol). No other treatments tried. No other aggravating or alleviating factors.       Review of Systems   Constitutional: Positive for fever and malaise/fatigue. Negative for chills.   HENT: Positive for congestion and sore throat.    Respiratory: Positive for cough.    Cardiovascular: Negative for chest pain.   Gastrointestinal: Negative for nausea and vomiting.   Neurological: Negative for dizziness and headaches.   Psychiatric/Behavioral: Negative for substance abuse.          Objective:     BP (!) 94/58   Pulse (!) 111   Temp (!) 38.2 °C (100.7 °F) (Temporal)   Ht 1.6 m (5' 3\")   Wt 82.7 kg (182 lb 6.4 oz)   SpO2 96%   BMI 32.31 kg/m²      Physical Exam  Vitals signs and nursing note reviewed.   Constitutional:       General: He is not in acute distress.     Appearance: Normal appearance. He is well-developed and normal weight. He is not ill-appearing or toxic-appearing.   HENT:      Head: Normocephalic.      Right Ear: Hearing, tympanic membrane, ear canal and external ear normal.      Left Ear: Hearing, tympanic membrane, ear canal and external ear normal.      Nose: Mucosal " edema and rhinorrhea present.      Right Sinus: No maxillary sinus tenderness or frontal sinus tenderness.      Left Sinus: No maxillary sinus tenderness or frontal sinus tenderness.      Mouth/Throat:      Mouth: Mucous membranes are moist.      Pharynx: Uvula midline. Posterior oropharyngeal erythema present. No oropharyngeal exudate or uvula swelling.   Eyes:      General: Lids are normal.      Conjunctiva/sclera: Conjunctivae normal.      Pupils: Pupils are equal, round, and reactive to light.   Neck:      Musculoskeletal: Full passive range of motion without pain, normal range of motion and neck supple. Normal range of motion. No erythema, spinous process tenderness or muscular tenderness.      Trachea: Trachea and phonation normal.   Cardiovascular:      Rate and Rhythm: Normal rate and regular rhythm.      Pulses: Normal pulses.      Heart sounds: Normal heart sounds. No murmur.   Pulmonary:      Effort: Pulmonary effort is normal. No respiratory distress.      Breath sounds: Normal breath sounds. No decreased breath sounds, wheezing, rhonchi or rales.   Chest:      Chest wall: No tenderness.   Abdominal:      Palpations: Abdomen is soft.   Musculoskeletal: Normal range of motion.   Lymphadenopathy:      Head:      Right side of head: No submandibular, tonsillar, preauricular or posterior auricular adenopathy.      Left side of head: No submandibular, tonsillar, preauricular or posterior auricular adenopathy.      Cervical: No cervical adenopathy.      Upper Body:      Right upper body: No supraclavicular adenopathy.      Left upper body: No supraclavicular adenopathy.   Skin:     General: Skin is warm and dry.      Capillary Refill: Capillary refill takes less than 2 seconds.      Findings: No rash.   Neurological:      Mental Status: He is alert and oriented to person, place, and time.      GCS: GCS eye subscore is 4. GCS verbal subscore is 5. GCS motor subscore is 6.      Gait: Gait normal.   Psychiatric:          Mood and Affect: Mood normal.         Speech: Speech normal.         Behavior: Behavior normal.         Thought Content: Thought content normal.         Judgment: Judgment normal.       + influenza A      Pertinent previous visits, Labs & / or Imaging studies reviewed in EHR         Assessment/Plan:     1. Influenza A  oseltamivir (TAMIFLU) 75 MG Cap   2. Cough  POCT Influenza A/B   3. Fever, unspecified fever cause  POCT Influenza A/B     Educated in proper administration of medication(s) ordered today including safety, possible SE, risks, benefits, rationale and alternatives to therapy.     Educated in natural progression of disease with supportive care and symptomatic relief of symptoms. Educated in s/s that would need further evaluation and management in ER, UC or by PCP. Follow up prn for new or worsening symptoms.   Keep well hydrated- Increase rest  Treat fever > 101.5 with OTC ibuprofen or acetaminophen. Follow Dosage and safety directions of the .   Educated in infection control practices.   Do not return to work until fever free for 24 hours.     Patient was in agreement with this treatment plan and seemed to understand without barriers. All questions were encouraged and answered

## 2020-01-28 LAB
S PYO SPEC QL CULT: NORMAL
SIGNIFICANT IND 70042: NORMAL
SITE SITE: NORMAL
SOURCE SOURCE: NORMAL

## 2020-01-31 ENCOUNTER — HOSPITAL ENCOUNTER (OUTPATIENT)
Facility: MEDICAL CENTER | Age: 45
End: 2020-01-31
Attending: NURSE PRACTITIONER
Payer: COMMERCIAL

## 2020-01-31 ENCOUNTER — OCCUPATIONAL MEDICINE (OUTPATIENT)
Dept: OCCUPATIONAL MEDICINE | Facility: CLINIC | Age: 45
End: 2020-01-31
Payer: COMMERCIAL

## 2020-01-31 VITALS
DIASTOLIC BLOOD PRESSURE: 86 MMHG | HEIGHT: 63 IN | TEMPERATURE: 98.7 F | SYSTOLIC BLOOD PRESSURE: 124 MMHG | BODY MASS INDEX: 31.71 KG/M2 | WEIGHT: 179 LBS | RESPIRATION RATE: 14 BRPM | HEART RATE: 79 BPM | OXYGEN SATURATION: 96 %

## 2020-01-31 DIAGNOSIS — S61.230A PUNCTURE WOUND OF RIGHT INDEX FINGER: ICD-10-CM

## 2020-01-31 DIAGNOSIS — Z77.21 EXPOSURE TO BLOOD OR BODY FLUID: Primary | ICD-10-CM

## 2020-01-31 DIAGNOSIS — Z77.21 EXPOSURE TO BLOOD OR BODY FLUID: ICD-10-CM

## 2020-01-31 LAB
ALBUMIN SERPL BCP-MCNC: 4.3 G/DL (ref 3.2–4.9)
ALBUMIN/GLOB SERPL: 1.1 G/DL
ALP SERPL-CCNC: 75 U/L (ref 30–99)
ALT SERPL-CCNC: 27 U/L (ref 2–50)
AMP AMPHETAMINE: NORMAL
ANION GAP SERPL CALC-SCNC: 7 MMOL/L (ref 0–11.9)
AST SERPL-CCNC: 27 U/L (ref 12–45)
BAR BARBITURATES: NORMAL
BILIRUB SERPL-MCNC: 0.6 MG/DL (ref 0.1–1.5)
BREATH ALCOHOL COMMENT: NORMAL
BUN SERPL-MCNC: 10 MG/DL (ref 8–22)
BZO BENZODIAZEPINES: NORMAL
CALCIUM SERPL-MCNC: 9.7 MG/DL (ref 8.5–10.5)
CHLORIDE SERPL-SCNC: 104 MMOL/L (ref 96–112)
CO2 SERPL-SCNC: 28 MMOL/L (ref 20–33)
COC COCAINE: NORMAL
CREAT SERPL-MCNC: 1.05 MG/DL (ref 0.5–1.4)
ERYTHROCYTE [DISTWIDTH] IN BLOOD BY AUTOMATED COUNT: 40.9 FL (ref 35.9–50)
GLOBULIN SER CALC-MCNC: 3.8 G/DL (ref 1.9–3.5)
GLUCOSE SERPL-MCNC: 84 MG/DL (ref 65–99)
HBV CORE AB SERPL QL IA: REACTIVE
HBV SURFACE AB SERPL IA-ACNC: >1000 MIU/ML (ref 0–10)
HBV SURFACE AG SER QL: NEGATIVE
HCT VFR BLD AUTO: 48.8 % (ref 42–52)
HCV AB SER QL: NEGATIVE
HGB BLD-MCNC: 16.2 G/DL (ref 14–18)
HIV 1+2 AB+HIV1 P24 AG SERPL QL IA: NON REACTIVE
INT CON NEG: NORMAL
INT CON POS: NORMAL
MCH RBC QN AUTO: 31.6 PG (ref 27–33)
MCHC RBC AUTO-ENTMCNC: 33.2 G/DL (ref 33.7–35.3)
MCV RBC AUTO: 95.3 FL (ref 81.4–97.8)
MDMA ECSTASY: NORMAL
MET METHAMPHETAMINES: NORMAL
MTD METHADONE: NORMAL
OPI OPIATES: NORMAL
OXY OXYCODONE: NORMAL
PCP PHENCYCLIDINE: NORMAL
PLATELET # BLD AUTO: 255 K/UL (ref 164–446)
PMV BLD AUTO: 9.2 FL (ref 9–12.9)
POC BREATHALIZER: 0 PERCENT (ref 0–0.01)
POC URINE DRUG SCREEN OCDRS: NORMAL
POTASSIUM SERPL-SCNC: 3.9 MMOL/L (ref 3.6–5.5)
PROT SERPL-MCNC: 8.1 G/DL (ref 6–8.2)
RBC # BLD AUTO: 5.12 M/UL (ref 4.7–6.1)
SODIUM SERPL-SCNC: 139 MMOL/L (ref 135–145)
THC: NORMAL
WBC # BLD AUTO: 10.5 K/UL (ref 4.8–10.8)

## 2020-01-31 PROCEDURE — 85027 COMPLETE CBC AUTOMATED: CPT

## 2020-01-31 PROCEDURE — 99213 OFFICE O/P EST LOW 20 MIN: CPT | Mod: 29 | Performed by: NURSE PRACTITIONER

## 2020-01-31 PROCEDURE — 87340 HEPATITIS B SURFACE AG IA: CPT

## 2020-01-31 PROCEDURE — 86706 HEP B SURFACE ANTIBODY: CPT

## 2020-01-31 PROCEDURE — 82075 ASSAY OF BREATH ETHANOL: CPT | Mod: 29 | Performed by: NURSE PRACTITIONER

## 2020-01-31 PROCEDURE — 80305 DRUG TEST PRSMV DIR OPT OBS: CPT | Mod: 29 | Performed by: NURSE PRACTITIONER

## 2020-01-31 PROCEDURE — 87389 HIV-1 AG W/HIV-1&-2 AB AG IA: CPT

## 2020-01-31 PROCEDURE — 80053 COMPREHEN METABOLIC PANEL: CPT

## 2020-01-31 PROCEDURE — 86704 HEP B CORE ANTIBODY TOTAL: CPT

## 2020-01-31 PROCEDURE — 86803 HEPATITIS C AB TEST: CPT

## 2020-01-31 ASSESSMENT — ENCOUNTER SYMPTOMS
MYALGIAS: 0
PSYCHIATRIC NEGATIVE: 1
NEUROLOGICAL NEGATIVE: 1
RESPIRATORY NEGATIVE: 1
CARDIOVASCULAR NEGATIVE: 1
CONSTITUTIONAL NEGATIVE: 1

## 2020-01-31 NOTE — LETTER
"EMPLOYEE’S CLAIM FOR COMPENSATION/ REPORT OF INITIAL TREATMENT  FORM C-4    EMPLOYEE’S CLAIM - PROVIDE ALL INFORMATION REQUESTED   First Name  Lucas Last Name  Iris Birthdate                    1975                Sex  male Claim Number   Home Address  [address] Age  44 y.o. Height  1.6 m (5' 3\") Weight  81.2 kg (179 lb) [facility]     [facility] Zip  [address] Telephone  [phone] (home)    Mailing Address  [address] [facility] [facility] [facility] Zip  [address] Primary Language Spoken  English    Insurer  [employer]   Workers Choice   Employee's Occupation (Job Title) When Injury or Occupational Disease Occurred  [employer]    Employer's Name  [employer]  Telephone  [phone]    Employer Address  [address]  [address]    Date of Injury  1/31/2020               Hour of Injury  7:01 AM Date Employer Notified  1/31/2020 Last Day of Work after Injury or Occupational Disease  1/31/2020 Supervisor to Whom Injury Reported  Shun Fabian   Address or Location of Accident (if applicable)  [[facility]]   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 (Be specific an answer in detail. Use additional sheet if necessary)  I cut my finger with a broken coverslip glass slide in the fridge door   If you believe that you have an occupational disease, when did you first have knowledge of the disability and it relationship to your employment?  n/a Witnesses to the Accident  Jackelyn Sagastume      Nature of Injury or Occupational Disease  Laceration  Part(s) of Body Injured or Affected  Finger (R), Hand (R),     I certify that the above is true and correct to the best of my knowledge and that I have provided this information in order to obtain the benefits of [place]’s " [place]).  I hereby authorize any physician, chiropractor, surgeon, practitioner, or other person, any hospital, including [facility] or [facility], any medical service organization, any insurance company, or other institution or organization to release to each other, any medical or other information, including benefits paid or payable, pertinent to this injury or disease, except information relative to diagnosis, treatment and/or counseling for AIDS, psychological conditions, alcohol or controlled substances, for which I must give specific authorization.  A Photostat of this authorization shall be as valid as the original.     Date   Place   Employee’s Signature   THIS REPORT MUST BE COMPLETED AND MAILED WITHIN 3 WORKING DAYS OF TREATMENT   Place  [facility]  Name of [facility]   Date  1/31/2020 Diagnosis  (Z77.21) Exposure to blood or body fluid  (S61.230A) Puncture wound of right index finger Is there evidence the injured employee was under the influence of alcohol and/or another controlled substance at the time of accident?   Hour  9:01 AM Description of Injury or Disease  Diagnoses of Exposure to blood or body fluid and Puncture wound of right index finger were pertinent to this visit. No   Treatment  None indicated at this time  Have you advised the patient to remain off work five days or more? No   X-Ray Findings      If Yes   From Date  To Date      From information given by the employee, together with medical evidence, can you directly connect this injury or occupational disease as job incurred?  Yes If No Full Duty Yes Modified Duty      Is additional medical care by a physician indicated?  Yes If Modified Duty, Specify any Limitations / Restrictions      Do you know of any previous injury or disease contributing to this condition or occupational disease?        "                     Yes   Date  1/31/2020 Print Doctor’s Name CATIE Huggins I certify the employer’s copy of  this form was mailed on:   Address  [address]   [address] Insurer’s Use Only     [facility] Zip  [address]    Provider’s Tax ID Number  [id number] Telephone  Dept: [phone]            e-Signature: Dr. Murphy Acosta,   Medical Director Degree  MD        ORIGINAL-TREATING PHYSICIAN OR CHIROPRACTOR    PAGE 2-INSURER/TPA    PAGE 3-EMPLOYER    PAGE 4-EMPLOYEE             Form C-4 (rev.10/07)              BRIEF DESCRIPTION OF RIGHTS AND BENEFITS  (Pursuant to [place] 616C.050)    Notice of Injury or Occupational Disease (Incident Report Form C-1): If an injury or occupational disease (OD) arises out of and in the course of employment, you must provide written notice to your employer as soon as practicable, but no later than 7 days after the accident or OD. Your employer shall maintain a sufficient supply of the required forms.    Claim for Compensation (Form C-4): If medical treatment is sought, the form C-4 is available at the place of initial treatment. A completed \"Claim for Compensation\" (Form C-4) must be filed within 90 days after an accident or OD. The treating physician or chiropractor must, within 3 working days after treatment, complete and mail to the employer, the employer's insurer and third-party , the Claim for Compensation.    Medical Treatment: If you require medical treatment for your on-the-job injury or OD, you may be required to select a physician or chiropractor from a list provided by your workers’ compensation insurer, if it has contracted with an Organization for Managed Care (MCO) or Preferred Provider Organization (PPO) or providers of health care. If your employer has not entered into a contract with an MCO or PPO, you may select a physician or chiropractor from the Panel of Physicians and Chiropractors. Any medical costs related to your industrial " injury or OD will be paid by your insurer.    Temporary Total Disability (TTD): If your doctor has certified that you are unable to work for a period of at least 5 consecutive days, or 5 cumulative days in a 20-day period, or places restrictions on you that your employer does not accommodate, you may be entitled to TTD compensation.    Temporary Partial Disability (TPD): If the wage you receive upon reemployment is less than the compensation for TTD to which you are entitled, the insurer may be required to pay you TPD compensation to make up the difference. TPD can only be paid for a maximum of 24 months.    Permanent Partial Disability (PPD): When your medical condition is stable and there is an indication of a PPD as a result of your injury or OD, within 30 days, your insurer must arrange for an evaluation by a rating physician or chiropractor to determine the degree of your PPD. The amount of your PPD award depends on the date of injury, the results of the PPD evaluation and your age and wage.    Permanent Total Disability (PTD): If you are medically certified by a treating physician or chiropractor as permanently and totally disabled and have been granted a PTD status by your insurer, you are entitled to receive monthly benefits not to exceed 66 2/3% of your average monthly wage. The amount of your PTD payments is subject to reduction if you previously received a PPD award.    Vocational Rehabilitation Services: You may be eligible for vocational rehabilitation services if you are unable to return to the job due to a permanent physical impairment or permanent restrictions as a result of your injury or occupational disease.    Transportation and Per Linda Reimbursement: You may be eligible for travel expenses and per linda associated with medical treatment.    Reopening: You may be able to reopen your claim if your condition worsens after claim closure.    Appeal Process: If you disagree with a written  determination issued by the insurer or the insurer does not respond to your request, you may appeal to the Department of Administration, , by following the instructions contained in your determination letter. You must appeal the determination within 70 days from the date of the determination letter at [address], [address], [place], [place] [address], or [address], [address], [place], [place] [address]. If you disagree with the  decision, you may appeal to the Department of Administration, . You must file your appeal within 30 days from the date of the  decision letter at [address], [address], [place], [place] [address], or [address], [address], [place], [place] [address]. If you disagree with a decision of an , you may file a petition for judicial review with the District Court. You must do so within 30 days of the Appeal Officer’s decision. You may be represented by an  at your own expense or you may contact the [facility] for possible representation.    [place]  for Injured Workers (NAIW): If you disagree with a  decision, you may request that NAIW represent you without charge at an  Hearing. For information regarding denial of benefits, you may contact the [facility] at: [address], [address], [place] [address], [phone], or [address], [address], [place] [address], [phone]    To File a Complaint with the Division: If you wish to file a complaint with the  of the Division of Industrial Relations (DIR),  please contact the Workers’ Compensation Section, [address], [address], [place], [place] [address], telephone (009) [phone], or [address], [address], [place], [place] [address], telephone [phone].    For assistance with Workers’ Compensation Issues: You may contact the Office of the  [facility] Consumer Health Assistance, [address], [address], [place] [address], Toll Free 1-452.658.4626, Web site: http://govcha.[facility].nv.us, E-mail [email or web address].                   __________________________________________________________________                                                     _________        Employee Name / Signature                                                                                                                                              Date                                                                                                                                                                                                     D-2 (rev. 06/18)

## 2020-01-31 NOTE — PROGRESS NOTES
Subjective:      Lucas Simmons Jr. is a 44 y.o. 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  I cut my finger with a broken cover slipped glass slide in the fridge door.  Patient states he feels that someone intentionally put a glass cover slip in an area where it was not supposed to be.  Patient states that he is concerned with being exposed to blood and body fluids, as this glass cover slip was found on the chemical side of the lab.  Discussed with patient that this is a very low risk exposure.  Patient verbalizes understanding, but still like to proceed with post exposure testing.  Overall patient is much improved.  Patient denies signs and symptoms of infection, decreased sensation, or weakness in his right finger.  Patient has not taken anything for pain at this time.  Patient has been doing appropriate wound care.  Patient is up-to-date on his tetanus vaccine.  Patient will be released to full duty.  Baseline labs drawn at this visit.  Patient remains asymptomatic.  No source available.  Plan of care discussed with patient.     HPI    Review of Systems   Constitutional: Negative.    Respiratory: Negative.    Cardiovascular: Negative.    Musculoskeletal: Negative for joint pain and myalgias.   Skin:        Barely visible pinpoint puncture to the pad of the index finger  No swelling, no redness, no discharge, or bruising present   Neurological: Negative.    Psychiatric/Behavioral: Negative.         ROS: All systems were reviewed on intake form, form was reviewed and signed. See scanned documents in media. Pertinent positives and negatives included in HPI.    PMH: No pertinent past medical history to this problem  MEDS: Medications were reviewed in Epic  ALLERGIES: No Known Allergies  SOCHX: Works as  at [employer]  FH: No pertinent family history to this problem       Objective:     /86 (BP Location: Right arm, Patient Position: Sitting, BP  "Cuff Size: Adult)   Pulse 79   Temp 37.1 °C (98.7 °F) (Temporal)   Resp 14   Ht 1.6 m (5' 3\")   Wt 81.2 kg (179 lb)   SpO2 96%   BMI 31.71 kg/m²      Physical Exam  Constitutional:       General: He is not in acute distress.     Appearance: Normal appearance. He is not ill-appearing.   Cardiovascular:      Rate and Rhythm: Normal rate and regular rhythm.      Pulses: Normal pulses.   Pulmonary:      Effort: Pulmonary effort is normal.   Musculoskeletal: Normal range of motion.         General: No swelling, tenderness, deformity or signs of injury.   Skin:     General: Skin is warm and dry.      Capillary Refill: Capillary refill takes less than 2 seconds.      Findings: No bruising or erythema.   Neurological:      General: No focal deficit present.      Mental Status: He is alert and oriented to person, place, and time.      Cranial Nerves: No cranial nerve deficit.      Sensory: No sensory deficit.      Motor: No weakness.      Gait: Gait normal.   Psychiatric:         Mood and Affect: Mood normal.         Behavior: Behavior normal.         Right index finger:  No obvious deformities noted  Barely visible pinpoint puncture to the pad of the index finger  Neg TTP  Neg edema, erythema, discharge, or bruising present  Distal neurovascular sensation and strength intact  Brisk cap refill 2+       Assessment/Plan:       1. Exposure to blood or body fluid    - EXPOSED PERSON-SOURCE PT POSITIVE OR UNK (BLOOD & BODY FLUID EXPOSURE); Future    2. Puncture wound of right index finger    - EXPOSED PERSON-SOURCE PT POSITIVE OR UNK (BLOOD & BODY FLUID EXPOSURE); Future    Follow-up Monday, to review baseline labs   Released to full duty   Source unknown if positive for HIV or HepC   This is an a very low risk injury with no visible blood in the area.   Discussed risks and benefits of HIV prophylaxis, due to the low risk nature of this incident no HIV prophylaxis was recommended.  Patient amenable to this.   Will follow " CDC monitoring guideline.  Repeat HIV and hepatitis C at 6 weeks, 3 months and 6 months    Disability Information

## 2020-01-31 NOTE — LETTER
[facility]  [address]   [address] [place] [address]  Phone:  [phone] - Fax:  [phone]   [facility] Progress Report and Disability Certification  Date of Service: 1/31/2020   No Show:  No  Date / Time of Next Visit: 2/3/2020 @ 1 PM   Claim Information   Patient Name: Lucas Simmons Jr.  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    Medical Information   Related to Industrial Injury? 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  I cut my finger with a broken cover slipped glass slide in the fridge door.  Patient states he feels that someone intentionally put a glass cover slip in an area where it was not supposed to be.  Patient states that he is concerned with being exposed to blood and body fluids, as this glass cover slip was found on the chemical side of the lab.  Discussed with patient that this is a very low risk exposure.  Patient verbalizes understanding, but still like to proceed with post exposure testing.  Overall patient is much improved.  Patient denies signs and symptoms of infection, decreased sensation, or weakness in his right finger.  Patient has not taken anything for pain at this time.  Patient has been doing appropriate wound care.  Patient is up-to-date on his tetanus vaccine.  Patient will be released to full duty.  Baseline labs drawn at this visit.  Patient remains asymptomatic.  No source available.  Plan of care discussed with patient.   Objective Findings: Right index finger:  No obvious deformities noted  Barely visible pinpoint puncture to the pad of the index finger  Neg TTP  Neg edema, erythema, discharge, or bruising present  Distal neurovascular sensation and strength  intact  Brisk cap refill 2+   Pre-Existing Condition(s):     Assessment:   Condition Improved    Status: Additional Care Required  Permanent Disability:No    Plan: Diagnostics    Diagnostics: Laboratory    Comments:  Follow-up Monday, to review baseline labs  Released to full duty  Source unknown if positive for HIV or HepC  This is an a very low risk injury with no visible blood in the area.  Discussed risks and benefits of HIV prophylaxis, due to the low risk n  ature of this incident no HIV prophylaxis was recommended.  Patient amenable to this.  Will follow CDC monitoring guideline.  Repeat HIV and hepatitis C at 6 weeks, 3 months and 6 months      Disability Information   Status: Released to Full Duty    From:  1/31/2020  Through: 2/3/2020 Restrictions are:     Physical Restrictions   Sitting:    Standing:    Stooping:    Bending:      Squatting:    Walking:    Climbing:    Pushing:      Pulling:    Other:    Reaching Above Shoulder (L):   Reaching Above Shoulder (R):       Reaching Below Shoulder (L):    Reaching Below Shoulder (R):      Not to exceed Weight Limits   Carrying(hrs):   Weight Limit(lb):   Lifting(hrs):   Weight  Limit(lb):     Comments:      Repetitive Actions   Hands: i.e. Fine Manipulations from Grasping:     Feet: i.e. Operating Foot Controls:     Driving / Operate Machinery:     Physician Name: CATIE Huggins Physician Signature:   e-Signature: Dr. Murphy Acosta, Medical Director   Clinic Name / Location: [address],   [address] [place] [address] Clinic Phone Number: Dept: [phone]   Appointment Time: 9:00 Am Visit Start Time: 9:01 AM   Check-In Time:  8:57 Am Visit Discharge Time:  9:34 AM    Original-Treating Physician or Chiropractor    Page 2-Insurer/TPA    Page 3-Employer    Page 4-Employee

## 2020-02-03 ENCOUNTER — OCCUPATIONAL MEDICINE (OUTPATIENT)
Dept: OCCUPATIONAL MEDICINE | Facility: CLINIC | Age: 45
End: 2020-02-03
Payer: COMMERCIAL

## 2020-02-03 VITALS
HEART RATE: 78 BPM | OXYGEN SATURATION: 97 % | RESPIRATION RATE: 14 BRPM | WEIGHT: 185 LBS | BODY MASS INDEX: 31.58 KG/M2 | HEIGHT: 64 IN

## 2020-02-03 DIAGNOSIS — S61.230A PUNCTURE WOUND OF RIGHT INDEX FINGER: ICD-10-CM

## 2020-02-03 DIAGNOSIS — Z77.21 EXPOSURE TO BLOOD OR BODY FLUID: ICD-10-CM

## 2020-02-03 PROCEDURE — 99213 OFFICE O/P EST LOW 20 MIN: CPT | Mod: 29 | Performed by: NURSE PRACTITIONER

## 2020-02-03 ASSESSMENT — ENCOUNTER SYMPTOMS
CARDIOVASCULAR NEGATIVE: 1
RESPIRATORY NEGATIVE: 1
PSYCHIATRIC NEGATIVE: 1
MUSCULOSKELETAL NEGATIVE: 1
CONSTITUTIONAL NEGATIVE: 1
NEUROLOGICAL NEGATIVE: 1

## 2020-02-03 NOTE — PROGRESS NOTES
Subjective:      Lucas Simmons Jr. is a 44 y.o. 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  I cut my finger with a broken cover slipped glass slide in the fridge door.  Patient states he feels that someone intentionally put a glass cover slip in an area where it was not supposed to be.  Patient states that he is concerned with being exposed to blood and body fluids, as this glass cover slip was found on the chemical side of the lab.  Discussed with patient that this is a very low risk exposure.  Patient verbalizes understanding, but still like to proceed with post exposure testing.  Overall patient is much improved.  Patient denies signs and symptoms of infection, decreased sensation, or weakness in his right finger.  Patient has not taken anything for pain at this time.  Patient has been doing appropriate wound care.  Patient is up-to-date on his tetanus vaccine.  Patient will be released to full duty.  Baseline labs drawn at this visit.  Patient remains asymptomatic.  No source available.  Plan of care discussed with patient.    Today overall patient improved.  Patient in office to review baseline labs.  Results reviewed with patient.  Patient has reactive hep B core antibody, negative for hep C and HIV.  Discussed with patient the importance that he follow-up with his primary care provider in regards to the hepatitis B. patient states that he was aware of the positive core lab result from years ago.  Patient verbalized his understanding. Patient remains asymptomatic otherwise.  Patient denies signs and symptoms of infection at puncture site, altered sensation, or weakness.  No source labs review.  Plan of care discussed with patient.  Health department notified per protocol.    HPI    Review of Systems   Constitutional: Negative.    Respiratory: Negative.    Cardiovascular: Negative.    Musculoskeletal: Negative.    Skin: Negative.    Neurological:  "Negative.    Psychiatric/Behavioral: Negative.         SOCHX: Works as a [place] at [employer]   FH: No pertinent family history to this problem.         Objective:     Pulse 78   Resp 14   Ht 1.626 m (5' 4\")   Wt 83.9 kg (185 lb)   SpO2 97%   BMI 31.76 kg/m²      Physical Exam  Constitutional:       General: He is not in acute distress.     Appearance: Normal appearance. He is not ill-appearing.   Cardiovascular:      Rate and Rhythm: Normal rate and regular rhythm.   Pulmonary:      Effort: Pulmonary effort is normal.   Musculoskeletal: Normal range of motion.         General: No swelling, tenderness, deformity or signs of injury.   Skin:     General: Skin is warm and dry.      Capillary Refill: Capillary refill takes less than 2 seconds.      Findings: No bruising or erythema.   Neurological:      General: No focal deficit present.      Mental Status: He is alert and oriented to person, place, and time.      Cranial Nerves: No cranial nerve deficit.      Sensory: No sensory deficit.      Motor: No weakness.      Gait: Gait normal.   Psychiatric:         Mood and Affect: Mood normal.         Behavior: Behavior normal.         Right index finger:  No obvious deformities noted  No visible pinpoint puncture to the pad of the index finger  Neg TTP  Neg edema, erythema, discharge, or bruising present  Distal neurovascular sensation and strength intact  Brisk cap refill 2+       Assessment/Plan:       1. Exposure to blood or body fluid    - HEP C VIRUS ANITBODY; Future  - HIV ANITBODIES; Future    2. Puncture wound of right index finger    - HEP C VIRUS ANITBODY; Future  - HIV ANITBODIES; Future    Follow-up at 6 weeks, to review post exposure labs   Released to full duty   Source unknown if positive for HIV or HepC   This is an a very low risk injury with no visible blood in the area.   Discussed risks and benefits of HIV prophylaxis, due to the low risk nature of this incident no HIV prophylaxis was recommended. "  Patient amenable to this.   Will follow CDC monitoring guideline.  Repeat HIV and hepatitis C at 6 weeks, 3 months and 6 months     HepC and HIV labs ordered, have drawn 2-3 days prior to visit   Follow-up with PCP regarding positive core hep B lab

## 2020-02-03 NOTE — LETTER
[facility] [place]  [address],   [address] [place] [address]  Phone:  [phone] - Fax:  [phone]   Occupational Health [facility] Progress Report and Disability Certification  Date of Service: 2/3/2020   No Show:  No  Date / Time of Next Visit: 3/5/2020 @ 1 pm   Claim Information   Patient Name: Lucas Simmons Jr.  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    Medical Information   Related to Industrial Injury? 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  I cut my finger with a broken cover slipped glass slide in the fridge door.  Patient states he feels that someone intentionally put a glass cover slip in an area where it was not supposed to be.  Patient states that he is concerned with being exposed to blood and body fluids, as this glass cover slip was found on the chemical side of the lab.  Discussed with patient that this is a very low risk exposure.  Patient verbalizes understanding, but still like to proceed with post exposure testing.  Overall patient is much improved.  Patient denies signs and symptoms of infection, decreased sensation, or weakness in his right finger.  Patient has not taken anything for pain at this time.  Patient has been doing appropriate wound care.  Patient is up-to-date on his tetanus vaccine.  Patient will be released to full duty.  Baseline labs drawn at this visit.  Patient remains asymptomatic.  No source available.  Plan of care discussed with patient.    Today overall patient improved.  Patient in office to review baseline labs.  Results reviewed with patient.  Patient has reactive hep B core antibody, negative for hep C and HIV.  Discussed with patient the importance that he follow-up with his  primary care provider in regards to the hepatitis B. patient states that he was aware of the positive core lab result from years ago.  Patient verbalized his understanding. Patient remains asymptomatic otherwise.  Patient denies signs and symptoms of infection at puncture site, altered sensation, or weakness.  No source labs review.  Plan of care discussed with patient.   Objective Findings: Right index finger:  No obvious deformities noted  No visible pinpoint puncture to the pad of the index finger  Neg TTP  Neg edema, erythema, discharge, or bruising present  Distal neurovascular sensation and strength intact  Brisk cap refill 2+   Pre-Existing Condition(s):     Assessment:   Condition Improved    Status: Additional Care Required  Permanent Disability:No    Plan: Diagnostics    Diagnostics: Laboratory    Comments:  Follow-up at 6 weeks, to review post exposure labs   Released to full duty   Source unknown if positive for HIV or HepC   This is an a very low risk injury with no visible blood in the area.   Discussed risks and benefits of HIV prophylaxis, due to t  he low risk nature of this incident no HIV prophylaxis was recommended.  Patient amenable to this.   Will follow CDC monitoring guideline.  Repeat HIV and hepatitis C at 6 weeks, 3 months and 6 months    HepC and HIV labs ordered, have drawn 2-3 days   prior to visit  Follow-up with PCP regarding positive core hep B lab      Disability Information   Status: Released to Full Duty    From:  2/3/2020  Through: 3/5/2020 Restrictions are:     Physical Restrictions   Sitting:    Standing:    Stooping:    Bending:      Squatting:    Walking:    Climbing:    Pushing:      Pulling:    Other:    Reaching Above Shoulder (L):   Reaching Above Shoulder (R):       Reaching Below Shoulder (L):    Reaching Below Shoulder (R):      Not to exceed Weight Limits   Carrying(hrs):   Weight Limit(lb):   Lifting(hrs):   Weight  Limit(lb):     Comments:      Repetitive Actions      Hands: i.e. Fine Manipulations from Grasping:     Feet: i.e. Operating Foot Controls:     Driving / Operate Machinery:     Physician Name: CATIE Huggins Physician Signature:   e-Signature: Dr. Murphy Acosta, Medical Director   Clinic Name / Location: [address],   [address]  [place] [address] Clinic Phone Number: Dept: [phone]   Appointment Time: 1:00 Pm Visit Start Time: 12:59 PM   Check-In Time:  12:55 Pm Visit Discharge Time:  1:11 pm   Original-Treating Physician or Chiropractor    Page 2-Insurer/TPA    Page 3-Employer    Page 4-Employee

## 2020-03-12 ENCOUNTER — HOSPITAL ENCOUNTER (OUTPATIENT)
Facility: MEDICAL CENTER | Age: 45
End: 2020-03-12
Attending: NURSE PRACTITIONER
Payer: COMMERCIAL

## 2020-03-12 DIAGNOSIS — Z77.21 EXPOSURE TO BLOOD OR BODY FLUID: ICD-10-CM

## 2020-03-12 DIAGNOSIS — S61.230A PUNCTURE WOUND OF RIGHT INDEX FINGER: ICD-10-CM

## 2020-03-12 LAB
HCV AB SER QL: NORMAL
HIV 1+2 AB+HIV1 P24 AG SERPL QL IA: NORMAL

## 2020-03-12 PROCEDURE — 86803 HEPATITIS C AB TEST: CPT

## 2020-03-12 PROCEDURE — 87389 HIV-1 AG W/HIV-1&-2 AB AG IA: CPT

## 2020-09-29 ENCOUNTER — IMMUNIZATION (OUTPATIENT)
Dept: OCCUPATIONAL MEDICINE | Facility: CLINIC | Age: 45
End: 2020-09-29

## 2020-09-29 DIAGNOSIS — Z23 NEED FOR VACCINATION: ICD-10-CM

## 2020-09-30 PROCEDURE — 90686 IIV4 VACC NO PRSV 0.5 ML IM: CPT | Performed by: NURSE PRACTITIONER

## 2020-12-07 ENCOUNTER — HOSPITAL ENCOUNTER (OUTPATIENT)
Dept: LAB | Facility: MEDICAL CENTER | Age: 45
End: 2020-12-07
Attending: EMERGENCY MEDICINE
Payer: COMMERCIAL

## 2020-12-07 LAB
COVID ORDER STATUS COVID19: NORMAL
SARS-COV-2 RNA RESP QL NAA+PROBE: NOTDETECTED
SPECIMEN SOURCE: NORMAL

## 2020-12-20 DIAGNOSIS — Z23 NEED FOR VACCINATION: ICD-10-CM

## 2020-12-29 ENCOUNTER — IMMUNIZATION (OUTPATIENT)
Dept: FAMILY PLANNING/WOMEN'S HEALTH CLINIC | Facility: IMMUNIZATION CENTER | Age: 45
End: 2020-12-29
Attending: FAMILY MEDICINE
Payer: COMMERCIAL

## 2020-12-29 DIAGNOSIS — Z23 ENCOUNTER FOR VACCINATION: Primary | ICD-10-CM

## 2020-12-29 DIAGNOSIS — Z23 NEED FOR VACCINATION: ICD-10-CM

## 2020-12-29 PROCEDURE — 0011A MODERNA SARS-COV-2 VACCINE: CPT

## 2020-12-29 PROCEDURE — 91301 MODERNA SARS-COV-2 VACCINE: CPT

## 2021-01-05 ENCOUNTER — OFFICE VISIT (OUTPATIENT)
Dept: MEDICAL GROUP | Facility: PHYSICIAN GROUP | Age: 46
End: 2021-01-05
Payer: COMMERCIAL

## 2021-01-05 VITALS
OXYGEN SATURATION: 96 % | BODY MASS INDEX: 31.73 KG/M2 | HEIGHT: 64 IN | HEART RATE: 74 BPM | DIASTOLIC BLOOD PRESSURE: 60 MMHG | TEMPERATURE: 96.7 F | SYSTOLIC BLOOD PRESSURE: 100 MMHG | WEIGHT: 185.85 LBS

## 2021-01-05 DIAGNOSIS — Z13.1 SCREENING FOR DIABETES MELLITUS (DM): ICD-10-CM

## 2021-01-05 DIAGNOSIS — Z13.220 SCREENING, LIPID: ICD-10-CM

## 2021-01-05 DIAGNOSIS — R10.2 SUPRAPUBIC PAIN: ICD-10-CM

## 2021-01-05 DIAGNOSIS — R10.9 LEFT FLANK PAIN: ICD-10-CM

## 2021-01-05 DIAGNOSIS — M79.18 BUTTOCK PAIN: ICD-10-CM

## 2021-01-05 LAB
APPEARANCE UR: NORMAL
BILIRUB UR STRIP-MCNC: NORMAL MG/DL
COLOR UR AUTO: NORMAL
GLUCOSE UR STRIP.AUTO-MCNC: NORMAL MG/DL
KETONES UR STRIP.AUTO-MCNC: NORMAL MG/DL
LEUKOCYTE ESTERASE UR QL STRIP.AUTO: NORMAL
NITRITE UR QL STRIP.AUTO: NORMAL
PH UR STRIP.AUTO: 5.5 [PH] (ref 5–8)
PROT UR QL STRIP: NORMAL MG/DL
RBC UR QL AUTO: NORMAL
SP GR UR STRIP.AUTO: 1.01
UROBILINOGEN UR STRIP-MCNC: 0.2 MG/DL

## 2021-01-05 PROCEDURE — 99214 OFFICE O/P EST MOD 30 MIN: CPT | Performed by: FAMILY MEDICINE

## 2021-01-05 PROCEDURE — 81002 URINALYSIS NONAUTO W/O SCOPE: CPT | Performed by: FAMILY MEDICINE

## 2021-01-05 ASSESSMENT — FIBROSIS 4 INDEX: FIB4 SCORE: 0.94

## 2021-01-05 ASSESSMENT — PATIENT HEALTH QUESTIONNAIRE - PHQ9: CLINICAL INTERPRETATION OF PHQ2 SCORE: 0

## 2021-01-06 NOTE — PROGRESS NOTES
Subjective:      Lucas Simmons Jr. is a 45 y.o. male who presents with Other (hurts to sit)            HPI     Patient is here after not being seen in a while with the last visit in 2018.    She is here because of pain in both buttocks which is noted after he has been sitting for about 5 minutes.  It is also noted when he is getting up from a sitting position.  The pain does not last long and goes away after he starts to move around.  The pain is specifically located in the area of the buttock where he sits on.  Denies any trauma.  Denies any problems with his bowel movement.  Denies any blood in his stool.  He said he already had a colonoscopy about 6 years ago when he was still living in [place] to work him up for his left flank pain.  He said the results came back no evidence of any abnormal findings.    He said he continues to have left flank pain which is described as a mild discomfort.  This is constant but does not interrupt his ADLs.  In 2017, I saw him for this problem and we did a urine dipstick which came back within normal limits.  At that time my impression was musculoskeletal pain probably muscle strain.    He said he also continues to have on and off suprapubic pain.  He complained to me about this in 2018.  We also did a urine dipstick at that time which came back within normal limits.  I thought this was due to dehydration and advised him proper hydration.  He said he continues to have the problem which lasts about 1 to 2 weeks at a time and goes away for a few weeks and comes back again.  Denies any urinary symptoms.  Denies any blood in the urine.    Past medical history, past surgical history, family history reviewed-no changes    Social history reviewed-no changes    Allergies reviewed-no changes    Medications reviewed-no changes    ROS     As per HPI, the rest are negative.       Objective:     /60 (BP Location: Left arm, Patient Position: Sitting, BP Cuff Size: Adult)   Pulse  "74   Temp 35.9 °C (96.7 °F) (Temporal)   Ht 1.626 m (5' 4\")   Wt 84.3 kg (185 lb 13.6 oz)   SpO2 96%   BMI 31.90 kg/m²      Physical Exam   Examined alert, awake, oriented, not in distress    Neck-supple, no lymphadenopathy, no thyromegaly  Lungs-clear to auscultation, no rales, no wheezes  Heart-regular rate and rhythm, no murmur  Abdomen-good bowel sounds, soft, nontender, no hepatosplenomegaly, no masses, no CVA tenderness  Extremities-no edema, clubbing, cyanosis  Buttock-no evidence of any erythema or swelling, no tenderness  Rectal exam-no abnormal findings in the perianal area, no tenderness on palpation of the perianal region, no open sores or lesions, tight sphincter tone, no tenderness, prostate nontender and normal size, no palpable masses, guaiac negative stool       Urine dipstick-specific gravity 1.015,ph5.5, urobilinogen 0.2, otherwise negative     Assessment/Plan:        1. Left flank pain  Chronic problem.  We did a urine dipstick again today which came back within normal limits.  No prior work-up including a CT scan of the abdomen and pelvis.  We will go ahead and proceed with this to make sure were not dealing with any problem with the kidneys.  We will also send him for updated blood work including CBC, CMP.  Advised proper hydration all the time.  Advised not to hold urine when he he feels his bladder is full and to go to the bathroom right away to urinate.  - POCT Urinalysis  - Lipid Profile; Future  - Comp Metabolic Panel; Future  - CBC WITH DIFFERENTIAL; Future  - CT-ABDOMEN-PELVIS WITH; Future    2. Suprapubic pain  This is a chronic problem.  We will get a CAT scan of the abdomen and pelvis to further evaluate for this on and off problem of chronic duration.  Advised to do a urine dipstick whenever he has the pain and he will message me of the results.  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  - POCT Urinalysis  - Lipid Profile; Future  - Comp Metabolic Panel; " Future  - CBC WITH DIFFERENTIAL; Future  - CT-ABDOMEN-PELVIS WITH; Future    3. Buttock pain  No abnormal findings on exam.  We will proceed with CT of the abdomen and pelvis to make sure were not dealing with occult infection such as deep abscess in the pelvis or perianal region.  - Lipid Profile; Future  - Comp Metabolic Panel; Future  - CBC WITH DIFFERENTIAL; Future  - CT-ABDOMEN-PELVIS WITH; Future    4. Screening for diabetes mellitus (DM)  Screening labs ordered.  - Lipid Profile; Future  - Comp Metabolic Panel; Future  - CBC WITH DIFFERENTIAL; Future    5. Screening, lipid  Screening labs ordered.  - Lipid Profile; Future  - Comp Metabolic Panel; Future  - CBC WITH DIFFERENTIAL; Future    We will communicate results once they are available.      Please note that this dictation was created using voice recognition software. I have worked with consultants from the vendor as well as technical experts from [facility] to optimize the interface. I have made every reasonable attempt to correct obvious errors, but I expect that there are errors of grammar and possibly content I did not discover before finalizing the note.

## 2021-01-15 ENCOUNTER — HOSPITAL ENCOUNTER (OUTPATIENT)
Facility: MEDICAL CENTER | Age: 46
End: 2021-01-15
Attending: FAMILY MEDICINE
Payer: COMMERCIAL

## 2021-01-15 DIAGNOSIS — Z13.220 SCREENING, LIPID: ICD-10-CM

## 2021-01-15 DIAGNOSIS — R10.9 LEFT FLANK PAIN: ICD-10-CM

## 2021-01-15 DIAGNOSIS — M79.18 BUTTOCK PAIN: ICD-10-CM

## 2021-01-15 DIAGNOSIS — Z13.1 SCREENING FOR DIABETES MELLITUS (DM): ICD-10-CM

## 2021-01-15 DIAGNOSIS — R10.2 SUPRAPUBIC PAIN: ICD-10-CM

## 2021-01-15 LAB
ALBUMIN SERPL BCP-MCNC: 4.4 G/DL (ref 3.2–4.9)
ALBUMIN/GLOB SERPL: 1.2 G/DL
ALP SERPL-CCNC: 89 U/L (ref 30–99)
ALT SERPL-CCNC: 41 U/L (ref 2–50)
ANION GAP SERPL CALC-SCNC: 9 MMOL/L (ref 7–16)
AST SERPL-CCNC: 29 U/L (ref 12–45)
BASOPHILS # BLD AUTO: 0.5 % (ref 0–1.8)
BASOPHILS # BLD: 0.03 K/UL (ref 0–0.12)
BILIRUB SERPL-MCNC: 0.6 MG/DL (ref 0.1–1.5)
BUN SERPL-MCNC: 10 MG/DL (ref 8–22)
CALCIUM SERPL-MCNC: 9.4 MG/DL (ref 8.5–10.5)
CHLORIDE SERPL-SCNC: 101 MMOL/L (ref 96–112)
CHOLEST SERPL-MCNC: 165 MG/DL (ref 100–199)
CO2 SERPL-SCNC: 26 MMOL/L (ref 20–33)
CREAT SERPL-MCNC: 0.91 MG/DL (ref 0.5–1.4)
EOSINOPHIL # BLD AUTO: 0.15 K/UL (ref 0–0.51)
EOSINOPHIL NFR BLD: 2.3 % (ref 0–6.9)
ERYTHROCYTE [DISTWIDTH] IN BLOOD BY AUTOMATED COUNT: 41.1 FL (ref 35.9–50)
GLOBULIN SER CALC-MCNC: 3.6 G/DL (ref 1.9–3.5)
GLUCOSE SERPL-MCNC: 88 MG/DL (ref 65–99)
HCT VFR BLD AUTO: 49.8 % (ref 42–52)
HDLC SERPL-MCNC: 43 MG/DL
HGB BLD-MCNC: 16.8 G/DL (ref 14–18)
IMM GRANULOCYTES # BLD AUTO: 0.04 K/UL (ref 0–0.11)
IMM GRANULOCYTES NFR BLD AUTO: 0.6 % (ref 0–0.9)
LDLC SERPL CALC-MCNC: 89 MG/DL
LYMPHOCYTES # BLD AUTO: 2.26 K/UL (ref 1–4.8)
LYMPHOCYTES NFR BLD: 35.2 % (ref 22–41)
MCH RBC QN AUTO: 31.9 PG (ref 27–33)
MCHC RBC AUTO-ENTMCNC: 33.7 G/DL (ref 33.7–35.3)
MCV RBC AUTO: 94.5 FL (ref 81.4–97.8)
MONOCYTES # BLD AUTO: 0.49 K/UL (ref 0–0.85)
MONOCYTES NFR BLD AUTO: 7.6 % (ref 0–13.4)
NEUTROPHILS # BLD AUTO: 3.45 K/UL (ref 1.82–7.42)
NEUTROPHILS NFR BLD: 53.8 % (ref 44–72)
NRBC # BLD AUTO: 0 K/UL
NRBC BLD-RTO: 0 /100 WBC
PLATELET # BLD AUTO: 274 K/UL (ref 164–446)
PMV BLD AUTO: 8.9 FL (ref 9–12.9)
POTASSIUM SERPL-SCNC: 4.4 MMOL/L (ref 3.6–5.5)
PROT SERPL-MCNC: 8 G/DL (ref 6–8.2)
RBC # BLD AUTO: 5.27 M/UL (ref 4.7–6.1)
SODIUM SERPL-SCNC: 136 MMOL/L (ref 135–145)
TRIGL SERPL-MCNC: 165 MG/DL (ref 0–149)
WBC # BLD AUTO: 6.4 K/UL (ref 4.8–10.8)

## 2021-01-15 PROCEDURE — 36415 COLL VENOUS BLD VENIPUNCTURE: CPT

## 2021-01-15 PROCEDURE — 80053 COMPREHEN METABOLIC PANEL: CPT

## 2021-01-15 PROCEDURE — 85025 COMPLETE CBC W/AUTO DIFF WBC: CPT

## 2021-01-15 PROCEDURE — 80061 LIPID PANEL: CPT

## 2021-01-29 PROCEDURE — 91301 MODERNA SARS-COV-2 VACCINE: CPT

## 2021-01-29 PROCEDURE — 0012A MODERNA SARS-COV-2 VACCINE: CPT

## 2021-01-30 ENCOUNTER — IMMUNIZATION (OUTPATIENT)
Dept: FAMILY PLANNING/WOMEN'S HEALTH CLINIC | Facility: IMMUNIZATION CENTER | Age: 46
End: 2021-01-30
Payer: COMMERCIAL

## 2021-01-30 DIAGNOSIS — Z23 ENCOUNTER FOR VACCINATION: Primary | ICD-10-CM

## 2021-02-05 ENCOUNTER — HOSPITAL ENCOUNTER (OUTPATIENT)
Dept: RADIOLOGY | Facility: MEDICAL CENTER | Age: 46
End: 2021-02-05
Attending: FAMILY MEDICINE
Payer: COMMERCIAL

## 2021-02-05 DIAGNOSIS — R10.2 SUPRAPUBIC PAIN: ICD-10-CM

## 2021-02-05 DIAGNOSIS — M79.18 BUTTOCK PAIN: ICD-10-CM

## 2021-02-05 DIAGNOSIS — R10.9 LEFT FLANK PAIN: ICD-10-CM

## 2021-02-05 PROCEDURE — 700117 HCHG RX CONTRAST REV CODE 255: Performed by: FAMILY MEDICINE

## 2021-02-05 PROCEDURE — 74177 CT ABD & PELVIS W/CONTRAST: CPT

## 2021-02-05 RX ADMIN — IOHEXOL 100 ML: 350 INJECTION, SOLUTION INTRAVENOUS at 10:30

## 2021-07-03 ENCOUNTER — OFFICE VISIT (OUTPATIENT)
Dept: URGENT CARE | Facility: CLINIC | Age: 46
End: 2021-07-03
Payer: COMMERCIAL

## 2021-07-03 VITALS
WEIGHT: 187 LBS | OXYGEN SATURATION: 98 % | BODY MASS INDEX: 31.92 KG/M2 | RESPIRATION RATE: 16 BRPM | TEMPERATURE: 98 F | SYSTOLIC BLOOD PRESSURE: 92 MMHG | HEIGHT: 64 IN | HEART RATE: 80 BPM | DIASTOLIC BLOOD PRESSURE: 78 MMHG

## 2021-07-03 DIAGNOSIS — L03.011 PARONYCHIA OF FINGER OF RIGHT HAND: ICD-10-CM

## 2021-07-03 DIAGNOSIS — L02.511 PULP ABSCESS OF FINGER OF RIGHT HAND: ICD-10-CM

## 2021-07-03 PROCEDURE — 10060 I&D ABSCESS SIMPLE/SINGLE: CPT | Mod: F7 | Performed by: NURSE PRACTITIONER

## 2021-07-03 RX ORDER — AMOXICILLIN 500 MG/1
500 CAPSULE ORAL 2 TIMES DAILY
Qty: 14 CAPSULE | Refills: 0 | Status: SHIPPED | OUTPATIENT
Start: 2021-07-03 | End: 2021-07-10

## 2021-07-03 ASSESSMENT — FIBROSIS 4 INDEX: FIB4 SCORE: 0.76

## 2021-07-03 NOTE — PROGRESS NOTES
"Lucas Simmons Jr. is a 45 y.o. male who presents for Finger Pain (swelling, rt third finger  x3 days )      HPI    ROS    Allergies:     No Known Allergies    PMSFS Hx:  Past Medical History:   Diagnosis Date   • Symptomatic PVCs      History reviewed. No pertinent surgical history.  Family History   Problem Relation Age of Onset   • Hypertension Mother    • Cancer Father         liver cancer    • Alcohol/Drug Father    • Arthritis Sister    • Arthritis Brother    • Arthritis Brother    • Arthritis Brother      Social History     Tobacco Use   • Smoking status: Never Smoker   • Smokeless tobacco: Never Used   Substance Use Topics   • Alcohol use: Yes     Alcohol/week: 0.0 oz     Comment: occasional       Problems:   Patient Active Problem List   Diagnosis   • PVC (premature ventricular contraction)   • Premature atrial complex   • Atypical chest pain   • Palpitations   • Hypokalemia   • Other chest pain   • Anxiety   • Transaminitis   • Obesity (BMI 30-39.9)       Medications:   Current Outpatient Medications on File Prior to Visit   Medication Sig Dispense Refill   • Multiple Vitamin (MULTIVITAMINS PO) Take  by mouth.       No current facility-administered medications on file prior to visit.          Objective:     BP (!) 92/78   Pulse 80   Temp 36.7 °C (98 °F) (Temporal)   Resp 16   Ht 1.626 m (5' 4\")   Wt 84.8 kg (187 lb)   SpO2 98%   BMI 32.10 kg/m²     Physical Exam  Vitals and nursing note reviewed.   Constitutional:       Appearance: Normal appearance. He is normal weight.   Cardiovascular:      Rate and Rhythm: Normal rate.      Pulses: Normal pulses.   Pulmonary:      Effort: Pulmonary effort is normal.   Musculoskeletal:      Right hand: Tenderness present.        Arms:       Cervical back: Normal range of motion.   Skin:     Capillary Refill: Capillary refill takes less than 2 seconds.   Neurological:      Mental Status: He is alert and oriented to person, place, and time.   Psychiatric:   "       Mood and Affect: Mood normal.         Behavior: Behavior normal.         Thought Content: Thought content normal.       Procedure: Needle Incision and Drainage   -Risks, benefits, and alternatives discussed. Risks including infection, bleeding, nerve damage, and poor cosmetic outcome. Informed consent obtained.    -Sterile technique throughout   -The area was prepped in the usual manner  -Incision with 18 ga needle 0.25 cc purulent material expressed   -Culture obtained and packaged for lab   -Irrigated copiously with NS   -Minimal bleeding with good hemostasis achieved   -Dressing applied  -The patient tolerated the procedure well            Assessment /Associated Orders:      1. Paronychia of finger of right hand  mupirocin (BACTROBAN) 2 % Ointment    amoxicillin (AMOXIL) 500 MG Cap   2. Pulp abscess of finger of right hand         Medical Decision Making:    Pt is clinically stable at today's acute urgent care visit.  No acute distress noted. Appropriate for outpatient management at this time.   Acute problem today with uncertain prognosis.   Educated in proper administration of medication(s) ordered today including safety, possible SE, risks, benefits, rationale and alternatives to therapy.   Epsom salt soak BID/ prn x 10 min for 2-3 days  OTC  analgesic of choice (acetaminophen or NSAID). Follow manufactures dosing and safety precautions.       Advised to follow-up with the primary care provider for recheck, reevaluation, and consideration of further management if necessary.   Discussed management options (risks,benefits, and alternatives to treatment). Expressed understanding and the treatment plan was agreed upon. Questions were encouraged and answered       Return to urgent care prn if new or worsening sx or if there is no improvement in condition prn.  Educated in Red flags and indications to immediately call 911 or present to the Emergency Department.     I personally reviewed prior external notes  and test results pertinent to today's visit.  I have independently reviewed and interpreted all diagnostics ordered during this urgent care acute visit.

## 2021-07-28 ENCOUNTER — TELEPHONE (OUTPATIENT)
Dept: MEDICAL GROUP | Facility: PHYSICIAN GROUP | Age: 46
End: 2021-07-28

## 2021-07-28 DIAGNOSIS — Z20.822 SUSPECTED COVID-19 VIRUS INFECTION: ICD-10-CM

## 2021-07-28 NOTE — TELEPHONE ENCOUNTER
Srinivasns wife called and her  is having covid symptoms, Diarrhea, body ache, cough.  Had company form out of town overf the weekend and now having symptoms.  Will call labs to get tested.

## 2021-07-29 ENCOUNTER — HOSPITAL ENCOUNTER (OUTPATIENT)
Facility: MEDICAL CENTER | Age: 46
End: 2021-07-29
Attending: PREVENTIVE MEDICINE
Payer: COMMERCIAL

## 2021-07-29 ENCOUNTER — EH NON-PROVIDER (OUTPATIENT)
Dept: OCCUPATIONAL MEDICINE | Facility: CLINIC | Age: 46
End: 2021-07-29
Payer: COMMERCIAL

## 2021-07-29 DIAGNOSIS — Z11.59 ENCOUNTER FOR SCREENING FOR OTHER VIRAL DISEASES: ICD-10-CM

## 2021-07-29 LAB
COVID ORDER STATUS COVID19: NORMAL
FLUAV RNA SPEC QL NAA+PROBE: NEGATIVE
FLUBV RNA SPEC QL NAA+PROBE: NEGATIVE
RSV RNA SPEC QL NAA+PROBE: POSITIVE
SARS-COV-2 RNA RESP QL NAA+PROBE: NOTDETECTED
SPECIMEN SOURCE: ABNORMAL

## 2021-07-29 PROCEDURE — 0241U HCHG SARS-COV-2 COVID-19 NFCT DS RESP RNA 4 TRGT MIC: CPT

## 2021-07-29 PROCEDURE — U0003 INFECTIOUS AGENT DETECTION BY NUCLEIC ACID (DNA OR RNA); SEVERE ACUTE RESPIRATORY SYNDROME CORONAVIRUS 2 (SARS-COV-2) (CORONAVIRUS DISEASE [COVID-19]), AMPLIFIED PROBE TECHNIQUE, MAKING USE OF HIGH THROUGHPUT TECHNOLOGIES AS DESCRIBED BY CMS-2020-01-R: HCPCS | Performed by: PREVENTIVE MEDICINE

## 2021-07-30 ENCOUNTER — TELEPHONE (OUTPATIENT)
Dept: MEDICAL GROUP | Facility: PHYSICIAN GROUP | Age: 46
End: 2021-07-30

## 2021-07-30 ENCOUNTER — TELEPHONE (OUTPATIENT)
Dept: OCCUPATIONAL MEDICINE | Facility: CLINIC | Age: 46
End: 2021-07-30

## 2021-07-30 NOTE — TELEPHONE ENCOUNTER
Phone Number Called: [phone] (home)       Call outcome: Did not leave a detailed message. Requested patient to call back.    Message: COVID results are negative and patient is cleared from the COVID screening line/watch.

## 2021-07-30 NOTE — TELEPHONE ENCOUNTER
Patient returned our [place], he is cleared from COVID testing due to a negative result. Patient stated that his RSV was + and I let him know that he needs to follow up with his PCP and supervisor for further clearance.

## 2021-07-30 NOTE — TELEPHONE ENCOUNTER
Phone Number Called: [phone] (home)     Call outcome: Spoke to patient regarding message below.    Message: Spoke with patient and confirmed and his first day out of work due to this condition was yesterday 7/29.

## 2021-07-30 NOTE — TELEPHONE ENCOUNTER
VOICEMAIL  1. Caller Name: Lucas Simmons Jr.  Call Back Number: [phone] (home)     2. Message: Patient LVM requesting a letter to excuse him from work as he tested positive for RSV.    3. Patient approves office to leave a detailed voicemail/MyChart message: yes    Routing to PCP to advise.

## 2021-07-30 NOTE — TELEPHONE ENCOUNTER
Phone Number Called: [phone] (home)     Call outcome: Spoke to patient regarding message below.    Message: Spoke with patient and let them know Jenny Frye M.D.'s message.

## 2021-10-21 ENCOUNTER — HOSPITAL ENCOUNTER (EMERGENCY)
Facility: MEDICAL CENTER | Age: 46
End: 2021-10-21
Attending: EMERGENCY MEDICINE
Payer: COMMERCIAL

## 2021-10-21 ENCOUNTER — APPOINTMENT (OUTPATIENT)
Dept: RADIOLOGY | Facility: MEDICAL CENTER | Age: 46
End: 2021-10-21
Attending: EMERGENCY MEDICINE
Payer: COMMERCIAL

## 2021-10-21 VITALS
WEIGHT: 181 LBS | DIASTOLIC BLOOD PRESSURE: 71 MMHG | RESPIRATION RATE: 16 BRPM | HEIGHT: 65 IN | SYSTOLIC BLOOD PRESSURE: 102 MMHG | OXYGEN SATURATION: 100 % | TEMPERATURE: 97.9 F | HEART RATE: 66 BPM | BODY MASS INDEX: 30.16 KG/M2

## 2021-10-21 DIAGNOSIS — R00.2 HEART PALPITATIONS: ICD-10-CM

## 2021-10-21 LAB
ALBUMIN SERPL BCP-MCNC: 4.1 G/DL (ref 3.2–4.9)
ALBUMIN/GLOB SERPL: 1.2 G/DL
ALP SERPL-CCNC: 93 U/L (ref 30–99)
ALT SERPL-CCNC: 29 U/L (ref 2–50)
ANION GAP SERPL CALC-SCNC: 12 MMOL/L (ref 7–16)
AST SERPL-CCNC: 20 U/L (ref 12–45)
BASOPHILS # BLD AUTO: 0.7 % (ref 0–1.8)
BASOPHILS # BLD: 0.05 K/UL (ref 0–0.12)
BILIRUB SERPL-MCNC: 0.6 MG/DL (ref 0.1–1.5)
BUN SERPL-MCNC: 14 MG/DL (ref 8–22)
CALCIUM SERPL-MCNC: 9.4 MG/DL (ref 8.4–10.2)
CHLORIDE SERPL-SCNC: 102 MMOL/L (ref 96–112)
CO2 SERPL-SCNC: 23 MMOL/L (ref 20–33)
CREAT SERPL-MCNC: 0.98 MG/DL (ref 0.5–1.4)
EKG IMPRESSION: NORMAL
EOSINOPHIL # BLD AUTO: 0.12 K/UL (ref 0–0.51)
EOSINOPHIL NFR BLD: 1.7 % (ref 0–6.9)
ERYTHROCYTE [DISTWIDTH] IN BLOOD BY AUTOMATED COUNT: 39.7 FL (ref 35.9–50)
GLOBULIN SER CALC-MCNC: 3.3 G/DL (ref 1.9–3.5)
GLUCOSE SERPL-MCNC: 89 MG/DL (ref 65–99)
HCT VFR BLD AUTO: 46 % (ref 42–52)
HGB BLD-MCNC: 16 G/DL (ref 14–18)
IMM GRANULOCYTES # BLD AUTO: 0.04 K/UL (ref 0–0.11)
IMM GRANULOCYTES NFR BLD AUTO: 0.6 % (ref 0–0.9)
LYMPHOCYTES # BLD AUTO: 3 K/UL (ref 1–4.8)
LYMPHOCYTES NFR BLD: 42.3 % (ref 22–41)
MCH RBC QN AUTO: 32 PG (ref 27–33)
MCHC RBC AUTO-ENTMCNC: 34.8 G/DL (ref 33.7–35.3)
MCV RBC AUTO: 92 FL (ref 81.4–97.8)
MONOCYTES # BLD AUTO: 0.75 K/UL (ref 0–0.85)
MONOCYTES NFR BLD AUTO: 10.6 % (ref 0–13.4)
NEUTROPHILS # BLD AUTO: 3.13 K/UL (ref 1.82–7.42)
NEUTROPHILS NFR BLD: 44.1 % (ref 44–72)
NRBC # BLD AUTO: 0 K/UL
NRBC BLD-RTO: 0 /100 WBC
PLATELET # BLD AUTO: 245 K/UL (ref 164–446)
PMV BLD AUTO: 9.3 FL (ref 9–12.9)
POTASSIUM SERPL-SCNC: 3.9 MMOL/L (ref 3.6–5.5)
PROT SERPL-MCNC: 7.4 G/DL (ref 6–8.2)
RBC # BLD AUTO: 5 M/UL (ref 4.7–6.1)
SODIUM SERPL-SCNC: 137 MMOL/L (ref 135–145)
T4 FREE SERPL-MCNC: 1.47 NG/DL (ref 0.93–1.7)
TROPONIN T SERPL-MCNC: <6 NG/L (ref 6–19)
TSH SERPL DL<=0.005 MIU/L-ACNC: 2.57 UIU/ML (ref 0.38–5.33)
WBC # BLD AUTO: 7.1 K/UL (ref 4.8–10.8)

## 2021-10-21 PROCEDURE — 71045 X-RAY EXAM CHEST 1 VIEW: CPT

## 2021-10-21 PROCEDURE — 99283 EMERGENCY DEPT VISIT LOW MDM: CPT

## 2021-10-21 PROCEDURE — 84443 ASSAY THYROID STIM HORMONE: CPT

## 2021-10-21 PROCEDURE — 93005 ELECTROCARDIOGRAM TRACING: CPT | Performed by: EMERGENCY MEDICINE

## 2021-10-21 PROCEDURE — 84484 ASSAY OF TROPONIN QUANT: CPT

## 2021-10-21 PROCEDURE — 80053 COMPREHEN METABOLIC PANEL: CPT

## 2021-10-21 PROCEDURE — 84439 ASSAY OF FREE THYROXINE: CPT

## 2021-10-21 PROCEDURE — 85025 COMPLETE CBC W/AUTO DIFF WBC: CPT

## 2021-10-21 PROCEDURE — 93005 ELECTROCARDIOGRAM TRACING: CPT

## 2021-10-21 ASSESSMENT — FIBROSIS 4 INDEX: FIB4 SCORE: 0.78

## 2021-10-22 ENCOUNTER — TELEPHONE (OUTPATIENT)
Dept: MEDICAL GROUP | Facility: PHYSICIAN GROUP | Age: 46
End: 2021-10-22

## 2021-10-22 NOTE — ED NOTES
Patient given discharge instructions questions and concerns addressed. Patient did not have a saline lock placed.

## 2021-10-22 NOTE — ED PROVIDER NOTES
ED Provider Note     Scribed for Shayna Byrd D.O. by Richie Waters. 10/21/2021, 6:53 PM.     Primary care provider: Jenny Frye M.D.  Means of arrival: Walk in         History obtained from: Patient  History limited by: None    CHIEF COMPLAINT  Chief Complaint   Patient presents with   • Palpitations     Intermittent palpitations all day while working       HPI  Lucas Simmons Jr. is a 46 y.o. male who presents to the emergency Department for evaluation of intermittent heart palpitations onset today. Patient was previously on propanolol and ceased three years ago. Patient does not consume caffeine regularly. He is not under any stress. Patient has not had blood work in several years. He has a primary care provider. He denies chest pain. No alleviating factors were reported.       REVIEW OF SYSTEMS  Pertinent positives include palpitation. Pertinent negatives include no chest pain.   See HPI for further details. All other systems are negative.    PAST MEDICAL HISTORY  Past Medical History:   Diagnosis Date   • Symptomatic PVCs        FAMILY HISTORY  Family History   Problem Relation Age of Onset   • Hypertension Mother    • Cancer Father         liver cancer    • Alcohol/Drug Father    • Arthritis Sister    • Arthritis Brother    • Arthritis Brother    • Arthritis Brother        SOCIAL HISTORY  Social History     Tobacco Use   • Smoking status: Never Smoker   • Smokeless tobacco: Never Used   Substance Use Topics   • Alcohol use: Yes     Alcohol/week: 0.0 oz     Comment: occasional   • Drug use: No      Social History     Substance and Sexual Activity   Drug Use No       SURGICAL HISTORY  History reviewed. No pertinent surgical history.    CURRENT MEDICATIONS  No current facility-administered medications for this encounter.    Current Outpatient Medications:   •  mupirocin (BACTROBAN) 2 % Ointment, Apply BID to finger for up to 10 days, Disp: 30 g, Rfl: 0  •  Multiple Vitamin (MULTIVITAMINS PO),  "Take  by mouth., Disp: , Rfl:     ALLERGIES  No Known Allergies    PHYSICAL EXAM  VITAL SIGNS: /83   Pulse 70   Temp 36.3 °C (97.4 °F) (Temporal)   Resp 16   Ht 1.651 m (5' 5\")   Wt 82.1 kg (181 lb)   SpO2 95%   BMI 30.12 kg/m²     Constitutional: Patient is well developed, well nourished. Non-toxic appearing. No acute distress.   Eyes: PERRL, EOMI, Conjunctiva without erythema or exudates.   Neck: Supple .  Lymphatic: No lymphadenopathy noted.   Cardiovascular: Normal heart rate and Regular rhythm. No murmur  Thorax & Lungs: Clear and equal breath sounds with good excursion. No respiratory distress  Skin: Warm, Dry  Back: No cervical, thoracic, or lumbosacral tenderness.   Extremities: Peripheral pulses 4/4 No edema, No tenderness  Musculoskeletal: Normal range of motion in all major joints. No tenderness to palpation or major deformities noted.   Neurologic: Alert & oriented x 3, Normal motor function, Normal sensory function,   Psychiatric: Affect normal, Judgment normal, Mood normal.     DIAGNOSTICS/PROCEDURES    LABS  Results for orders placed or performed during the hospital encounter of 10/21/21   CBC with Differential   Result Value Ref Range    WBC 7.1 4.8 - 10.8 K/uL    RBC 5.00 4.70 - 6.10 M/uL    Hemoglobin 16.0 14.0 - 18.0 g/dL    Hematocrit 46.0 42.0 - 52.0 %    MCV 92.0 81.4 - 97.8 fL    MCH 32.0 27.0 - 33.0 pg    MCHC 34.8 33.7 - 35.3 g/dL    RDW 39.7 35.9 - 50.0 fL    Platelet Count 245 164 - 446 K/uL    MPV 9.3 9.0 - 12.9 fL    Neutrophils-Polys 44.10 44.00 - 72.00 %    Lymphocytes 42.30 (H) 22.00 - 41.00 %    Monocytes 10.60 0.00 - 13.40 %    Eosinophils 1.70 0.00 - 6.90 %    Basophils 0.70 0.00 - 1.80 %    Immature Granulocytes 0.60 0.00 - 0.90 %    Nucleated RBC 0.00 /100 WBC    Neutrophils (Absolute) 3.13 1.82 - 7.42 K/uL    Lymphs (Absolute) 3.00 1.00 - 4.80 K/uL    Monos (Absolute) 0.75 0.00 - 0.85 K/uL    Eos (Absolute) 0.12 0.00 - 0.51 K/uL    Baso (Absolute) 0.05 0.00 - 0.12 " K/uL    Immature Granulocytes (abs) 0.04 0.00 - 0.11 K/uL    NRBC (Absolute) 0.00 K/uL   Complete Metabolic Panel (CMP)   Result Value Ref Range    Sodium 137 135 - 145 mmol/L    Potassium 3.9 3.6 - 5.5 mmol/L    Chloride 102 96 - 112 mmol/L    Co2 23 20 - 33 mmol/L    Anion Gap 12.0 7.0 - 16.0    Glucose 89 65 - 99 mg/dL    Bun 14 8 - 22 mg/dL    Creatinine 0.98 0.50 - 1.40 mg/dL    Calcium 9.4 8.4 - 10.2 mg/dL    AST(SGOT) 20 12 - 45 U/L    ALT(SGPT) 29 2 - 50 U/L    Alkaline Phosphatase 93 30 - 99 U/L    Total Bilirubin 0.6 0.1 - 1.5 mg/dL    Albumin 4.1 3.2 - 4.9 g/dL    Total Protein 7.4 6.0 - 8.2 g/dL    Globulin 3.3 1.9 - 3.5 g/dL    A-G Ratio 1.2 g/dL   Troponin   Result Value Ref Range    Troponin T <6 6 - 19 ng/L   ESTIMATED GFR   Result Value Ref Range    GFR If African American >60 >60 mL/min/1.73 m 2    GFR If Non African American >60 >60 mL/min/1.73 m 2   TSH   Result Value Ref Range    TSH 2.570 0.380 - 5.330 uIU/mL   FREE THYROXINE   Result Value Ref Range    Free T-4 1.47 0.93 - 1.70 ng/dL   EKG   Result Value Ref Range    Report       [facility] Emergency Dept.    Test Date:  2021-10-21  Pt Name:    HERACLIO LAMAS               Department: [facility]  MRN:        [id number]                      Room:  Gender:     Male                         Technician:   :        1975                   Requested By:ER TRIAGE PROTOCOL  Order #:    [id number]                    Reading MD:    Measurements  Intervals                                Axis  Rate:       68                           P:          35  GA:         148                          QRS:        27  QRSD:       87                           T:          11  QT:         386  QTc:        411    Interpretive Statements  Sinus rhythm  Compared to ECG 2018 16:43:16  No significant changes         Labs reviewed by me    EKG  12 Lead EKG interpreted by me as above.       RADIOLOGY/PROCEDURES  DX-CHEST-PORTABLE (1 VIEW)   Final  Result      No evidence of acute cardiopulmonary process.        Results and radiologist interpretation reviewed by me.     COURSE & MEDICAL DECISION MAKING  Pertinent Labs & Imaging studies reviewed. (See chart for details)    6:53 PM - Patient seen and evaluated at bedside. I informed patient of plan to obtain blood work. Ordered for DX-Chest, TSH, free thyroxine, estimated GFR, CBC with diff, CMP, troponin, and EKG to evaluate. Differential diagnoses include, but are not limited to, thyroid, atrial fibrillation, anxiety    8:44 PM - I reevaluated the patient at bedside. I discussed the patient's diagnostic study results which show no abnormalities.  Patient was reassured there was nothing going on with his heart, he is to avoid all caffeine alcohol and tobacco products and return if any problems or worsening.  I discussed plan for discharge and follow up as outlined below. The patient verbalizes they feel comfortable going home. The patient is stable for discharge at this time and will return for any new or worsening symptoms. Patient verbalizes understanding and support with my plan for discharge.      The patient will return for new or worsening symptoms and is stable at the time of discharge.    The patient is referred to a primary physician for blood pressure management, diabetic screening, and for all other preventative health concerns.      DISPOSITION:  Patient will be discharged home in stable condition.    FOLLOW UP:  Jenny Frye M.D.  [address] [place] [id number] [phone]    Schedule an appointment as soon as possible for a visit in 2 days  As needed      FINAL IMPRESSION  1. Heart palpitations         Richie PINEDA), am scribing for, and in the presence of, Shayna Byrd D.O..    Electronically signed by: Richie Li), 10/21/2021    Shayna PINEDA D.O. personally performed the services described in this documentation, as scribed by Richie  Evelin in my presence, and it is both accurate and complete. C    The note accurately reflects work and decisions made by me.  Shayna Byrd D.O.  10/22/2021  2:45 AM

## 2021-10-22 NOTE — DISCHARGE INSTRUCTIONS
Avoid all caffeinated products, no alcohol or tobacco  Increase fluids, rest  All of your laboratories were normal tonight along with your chest x-ray and EKG.  Return if any worsening symptoms, chest pain or shortness of breath.

## 2021-11-22 ENCOUNTER — OFFICE VISIT (OUTPATIENT)
Dept: DERMATOLOGY | Facility: IMAGING CENTER | Age: 46
End: 2021-11-22
Payer: COMMERCIAL

## 2021-11-22 DIAGNOSIS — R20.8 OTHER DISTURBANCES OF SKIN SENSATION: ICD-10-CM

## 2021-11-22 DIAGNOSIS — D18.01 CHERRY ANGIOMA: ICD-10-CM

## 2021-11-22 DIAGNOSIS — L82.0 INFLAMED SEBORRHEIC KERATOSIS: ICD-10-CM

## 2021-11-22 PROCEDURE — 17110 DESTRUCTION B9 LES UP TO 14: CPT | Performed by: NURSE PRACTITIONER

## 2021-11-22 PROCEDURE — 99212 OFFICE O/P EST SF 10 MIN: CPT | Mod: 25 | Performed by: NURSE PRACTITIONER

## 2021-11-22 NOTE — PROGRESS NOTES
DERMATOLOGY NOTE  NEW VISIT       Chief complaint: Skin Lesion and Establish Care     HPI: mole   Location: chest   Time present: 1 year   Painful lesion: No  Itching lesion: No  Enlarging lesion: Yes  Anything make it better or worse?no           History of skin cancer: No  History of precancers/actinic keratoses: No  History of biopsies:No  History of blistering/severe sunburns:No  Family history of skin cancer:No  Family history of atypical moles:No      No Known Allergies     MEDICATIONS:  Medications relevant to specialty reviewed.     REVIEW OF SYSTEMS:   Positive for skin (see HPI)  Negative for fevers and chills       EXAM:  There were no vitals taken for this visit.  Constitutional: Well-developed, well-nourished, and in no distress.     A focused skin exam was performed including the affected areas of the chest. Notable findings on exam today listed below and/or in assessment/plan.  One dark brown stuck-on waxy papule scattered on the trunk  One 3mm bright red papule on the trunk      IMPRESSION / PLAN:    1. Cherry angioma  - Benign-appearing nature of lesions discussed. Advised to return to clinic for any new or concerning changes.      2. Inflamed seborrheic keratosis--plus pruritis  CRYOTHERAPY:  Risks (including, but not limited to: hypo or hyperpigmentation, redness, blister, blood blister, recurrence, need for further treatment, infection, scar) and benefits of cryotherapy discussed. Patient verbally agreed to proceed with treatment. 1 cryotherapy freeze thaw cycles of 10 seconds were applied to 1 lesion on chest with cryac. Patient tolerated procedure well. Aftercare instructions given.      3. Other disturbances of skin sensation  Related to ISK       Please note that this dictation was created using voice recognition software. I have made every reasonable attempt to correct obvious errors, but I expect that there are errors of grammar and possibly content that I did not discover before finalizing  the note.      Return to clinic in: Return for PRN. and as needed for any new or changing skin lesions.

## 2021-12-03 ENCOUNTER — PHARMACY VISIT (OUTPATIENT)
Dept: PHARMACY | Facility: MEDICAL CENTER | Age: 46
End: 2021-12-03
Payer: COMMERCIAL

## 2021-12-03 PROCEDURE — RXMED WILLOW AMBULATORY MEDICATION CHARGE: Performed by: INTERNAL MEDICINE

## 2021-12-03 RX ORDER — CX-024414 0.2 MG/ML
0.25 INJECTION, SUSPENSION INTRAMUSCULAR
Qty: 0.25 ML | Refills: 0 | OUTPATIENT
Start: 2021-12-03

## 2022-02-17 ENCOUNTER — OFFICE VISIT (OUTPATIENT)
Dept: MEDICAL GROUP | Facility: PHYSICIAN GROUP | Age: 47
End: 2022-02-17
Payer: COMMERCIAL

## 2022-02-17 VITALS
HEART RATE: 80 BPM | TEMPERATURE: 97.4 F | BODY MASS INDEX: 31.09 KG/M2 | WEIGHT: 182.1 LBS | OXYGEN SATURATION: 97 % | SYSTOLIC BLOOD PRESSURE: 100 MMHG | DIASTOLIC BLOOD PRESSURE: 60 MMHG | HEIGHT: 64 IN

## 2022-02-17 DIAGNOSIS — M10.9 GOUT, ARTHRITIS: ICD-10-CM

## 2022-02-17 DIAGNOSIS — R00.2 PALPITATIONS: ICD-10-CM

## 2022-02-17 DIAGNOSIS — R73.01 IMPAIRED FASTING BLOOD SUGAR: ICD-10-CM

## 2022-02-17 DIAGNOSIS — E78.5 DYSLIPIDEMIA: ICD-10-CM

## 2022-02-17 PROCEDURE — 99214 OFFICE O/P EST MOD 30 MIN: CPT | Performed by: FAMILY MEDICINE

## 2022-02-17 RX ORDER — PROPRANOLOL HYDROCHLORIDE 10 MG/1
10 TABLET ORAL DAILY
COMMUNITY

## 2022-02-17 RX ORDER — PROPRANOLOL HYDROCHLORIDE 10 MG/1
5 TABLET ORAL 2 TIMES DAILY
Qty: 90 TABLET | Refills: 0 | Status: SHIPPED | OUTPATIENT
Start: 2022-02-17 | End: 2022-05-23

## 2022-02-17 ASSESSMENT — PATIENT HEALTH QUESTIONNAIRE - PHQ9
SUM OF ALL RESPONSES TO PHQ QUESTIONS 1-9: 5
5. POOR APPETITE OR OVEREATING: 0 - NOT AT ALL
CLINICAL INTERPRETATION OF PHQ2 SCORE: 2

## 2022-02-17 ASSESSMENT — FIBROSIS 4 INDEX: FIB4 SCORE: 0.71

## 2022-02-17 NOTE — PROGRESS NOTES
"Subjective     Lucas Simmons is a 46 y.o. male who presents with Follow-Up            HPI     Patient returns for follow-up.    Patient has history of palpitations due to PACs and PVCs.  Palpitations are triggered by stress from his work.  He takes propranolol 10 mg half a tablet twice daily as needed.  He gave 2-week notice of his job a week ago.  He said it came to the point that he could not handle the stress anymore.  His wife also did the same thing.  Since he quit his job, he has felt significant relief from the stress that he has not felt the palpitations anymore.  He would like to have refills just in case he needs it.    Patient with history of dyslipidemia which is managed with his own efforts only.  The last lipid panel was a year ago in January 2021 that came back all at goal except for mild elevation of triglycerides at 165.    We also follow him for impaired fasting blood sugar and he manages this by watching his diet.  The last fasting blood sugar was normal at 88.    He said he gets attack of gout in the first big toe of both feet.  The last episode was about 2 to 3 months ago.  He said this is usually triggered by food triggers.  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  He said he does not get more than 3-4 times a year.    Past medical history, past surgical history, family history reviewed-no changes    Social history reviewed-no changes    Allergies reviewed-no changes    Medications reviewed-no changes    ROS     As per HPI, the rest are negative.           Objective     /60 (BP Location: Left arm, Patient Position: Sitting, BP Cuff Size: Adult)   Pulse 80   Temp 36.3 °C (97.4 °F) (Temporal)   Ht 1.626 m (5' 4\")   Wt 82.6 kg (182 lb 1.6 oz)   SpO2 97%   BMI 31.26 kg/m²      Physical Exam     Examined alert, awake, oriented, not in distress    Neck-supple, no lymphadenopathy, no thyromegaly  Lungs-clear to auscultation, " no rales, no wheezes  Heart-regular rate and rhythm, no murmur  Extremities-no edema, clubbing, cyanosis                        Assessment & Plan        1. Dyslipidemia  He is managing this with his own efforts only.  We will do updated blood work.    2. Impaired fasting blood sugar  This is managed by watching his diet.  We will do updated blood work.    3. Gout, arthritis  He gets attack of gout involving the first big toe.  He did his own uric acid level at work and it was slightly high at 8.5.  We will do updated uric acid level.  Advised to avoid food triggers.  Consider allopurinol if attack is more than 3-4 times a year.  For now we will hold off on allopurinol.    4. Palpitations  His palpitations are worsened by stress.  He has quit his job a week ago and gave her 2-week notice.  He only has 1 more week to work.  He has already felt significant relief from the stress and he has not had palpitations.  I gave him refills of propranolol so he can have them on hand if he needs them in the future.    I will have him return as needed.      Please note that this dictation was created using voice recognition software. I have worked with consultants from the vendor as well as technical experts from [facility] to optimize the interface. I have made every reasonable attempt to correct obvious errors, but I expect that there are errors of grammar and possibly content I did not discover before finalizing the note.

## 2022-02-19 ENCOUNTER — HOSPITAL ENCOUNTER (OUTPATIENT)
Facility: MEDICAL CENTER | Age: 47
End: 2022-02-19
Attending: FAMILY MEDICINE
Payer: COMMERCIAL

## 2022-02-19 DIAGNOSIS — M10.9 GOUT, ARTHRITIS: ICD-10-CM

## 2022-02-19 DIAGNOSIS — R73.01 IMPAIRED FASTING BLOOD SUGAR: ICD-10-CM

## 2022-02-19 DIAGNOSIS — E78.5 DYSLIPIDEMIA: ICD-10-CM

## 2022-02-19 LAB
ALBUMIN SERPL BCP-MCNC: 4.7 G/DL (ref 3.2–4.9)
ALBUMIN/GLOB SERPL: 1.5 G/DL
ALP SERPL-CCNC: 90 U/L (ref 30–99)
ALT SERPL-CCNC: 34 U/L (ref 2–50)
ANION GAP SERPL CALC-SCNC: 10 MMOL/L (ref 7–16)
AST SERPL-CCNC: 30 U/L (ref 12–45)
BASOPHILS # BLD AUTO: 0.4 % (ref 0–1.8)
BASOPHILS # BLD: 0.03 K/UL (ref 0–0.12)
BILIRUB SERPL-MCNC: 0.6 MG/DL (ref 0.1–1.5)
BUN SERPL-MCNC: 12 MG/DL (ref 8–22)
CALCIUM SERPL-MCNC: 9.4 MG/DL (ref 8.5–10.5)
CHLORIDE SERPL-SCNC: 104 MMOL/L (ref 96–112)
CHOLEST SERPL-MCNC: 161 MG/DL (ref 100–199)
CO2 SERPL-SCNC: 24 MMOL/L (ref 20–33)
CREAT SERPL-MCNC: 0.94 MG/DL (ref 0.5–1.4)
EOSINOPHIL # BLD AUTO: 0.09 K/UL (ref 0–0.51)
EOSINOPHIL NFR BLD: 1.3 % (ref 0–6.9)
ERYTHROCYTE [DISTWIDTH] IN BLOOD BY AUTOMATED COUNT: 40.7 FL (ref 35.9–50)
EST. AVERAGE GLUCOSE BLD GHB EST-MCNC: 111 MG/DL
GLOBULIN SER CALC-MCNC: 3.1 G/DL (ref 1.9–3.5)
GLUCOSE SERPL-MCNC: 83 MG/DL (ref 65–99)
HBA1C MFR BLD: 5.5 % (ref 4–5.6)
HCT VFR BLD AUTO: 47.8 % (ref 42–52)
HDLC SERPL-MCNC: 49 MG/DL
HGB BLD-MCNC: 16.7 G/DL (ref 14–18)
IMM GRANULOCYTES # BLD AUTO: 0.03 K/UL (ref 0–0.11)
IMM GRANULOCYTES NFR BLD AUTO: 0.4 % (ref 0–0.9)
LDLC SERPL CALC-MCNC: 87 MG/DL
LYMPHOCYTES # BLD AUTO: 2.65 K/UL (ref 1–4.8)
LYMPHOCYTES NFR BLD: 36.8 % (ref 22–41)
MCH RBC QN AUTO: 32.6 PG (ref 27–33)
MCHC RBC AUTO-ENTMCNC: 34.9 G/DL (ref 33.7–35.3)
MCV RBC AUTO: 93.4 FL (ref 81.4–97.8)
MONOCYTES # BLD AUTO: 0.64 K/UL (ref 0–0.85)
MONOCYTES NFR BLD AUTO: 8.9 % (ref 0–13.4)
NEUTROPHILS # BLD AUTO: 3.76 K/UL (ref 1.82–7.42)
NEUTROPHILS NFR BLD: 52.2 % (ref 44–72)
NRBC # BLD AUTO: 0 K/UL
NRBC BLD-RTO: 0 /100 WBC
PLATELET # BLD AUTO: 272 K/UL (ref 164–446)
PMV BLD AUTO: 9.7 FL (ref 9–12.9)
POTASSIUM SERPL-SCNC: 4.5 MMOL/L (ref 3.6–5.5)
PROT SERPL-MCNC: 7.8 G/DL (ref 6–8.2)
RBC # BLD AUTO: 5.12 M/UL (ref 4.7–6.1)
SODIUM SERPL-SCNC: 138 MMOL/L (ref 135–145)
TRIGL SERPL-MCNC: 127 MG/DL (ref 0–149)
URATE SERPL-MCNC: 8.3 MG/DL (ref 2.5–8.3)
WBC # BLD AUTO: 7.2 K/UL (ref 4.8–10.8)

## 2022-02-19 PROCEDURE — 84550 ASSAY OF BLOOD/URIC ACID: CPT

## 2022-02-19 PROCEDURE — 83036 HEMOGLOBIN GLYCOSYLATED A1C: CPT

## 2022-02-19 PROCEDURE — 80053 COMPREHEN METABOLIC PANEL: CPT

## 2022-02-19 PROCEDURE — 80061 LIPID PANEL: CPT

## 2022-02-19 PROCEDURE — 85025 COMPLETE CBC W/AUTO DIFF WBC: CPT

## 2022-05-23 DIAGNOSIS — R00.2 PALPITATIONS: ICD-10-CM

## 2022-05-23 RX ORDER — PROPRANOLOL HYDROCHLORIDE 10 MG/1
TABLET ORAL
Qty: 90 TABLET | Refills: 1 | Status: SHIPPED | OUTPATIENT
Start: 2022-05-23

## 2025-06-03 NOTE — PROGRESS NOTES
Telemetry Summary    Rhythm SR  HR 60-80  Ectopy -  NH 0.18  QRS 0.08  QT 0.36   SUICIDE RISK ASSESSMENT  YES NO If yes, describe      x Suicide attempt in last 24 hour?      x Suicidal thoughts?      x Plan or considering various methods? If so, Describe:       x Access to means? If so- Specify weapon location       x Indication of substance abuse/dependence?      x Attempts in past?  How many?  Date of most recent:   Method used?       x Any family members, loved ones, friends who committed suicide?      x Recent deaths, losses, anniversary dates?      x Has made preparations for death?      x Lack of support system?    x   Verbal contract for safety?   x    Patient has no current intent,or plan, but agrees to contact provider if suicidal ideation I arises.    x   Patient given emergency 24 hour access information.       VIOLENCE/HOMICIDE POTENTIAL   YES  NO  If yes, describe current or past violence    [ ]  [X]  Threat made to harm or kill someone?   A specific individual? Name:     [ ]  [X]  Access to weapon? Where is weapon?    [x ]  [ ]  History of violence/aggressive behavior to others?    [ x]  [ ]  History of significant damage to property?    [ ]  [X]  Indication of substance abuse/dependence?    [ ]  [X]  Witnessed violence or significant aggression?